# Patient Record
Sex: FEMALE | Race: WHITE | NOT HISPANIC OR LATINO | Employment: OTHER | URBAN - METROPOLITAN AREA
[De-identification: names, ages, dates, MRNs, and addresses within clinical notes are randomized per-mention and may not be internally consistent; named-entity substitution may affect disease eponyms.]

---

## 2018-02-28 ENCOUNTER — HOSPITAL ENCOUNTER (OUTPATIENT)
Dept: GENERAL RADIOLOGY | Facility: HOSPITAL | Age: 63
Discharge: HOME | End: 2018-02-28
Attending: FAMILY MEDICINE | Admitting: FAMILY MEDICINE

## 2018-03-06 ENCOUNTER — HOSPITAL ENCOUNTER (OUTPATIENT)
Dept: GENERAL RADIOLOGY | Facility: HOSPITAL | Age: 63
Discharge: HOME | End: 2018-03-06
Attending: FAMILY MEDICINE | Admitting: FAMILY MEDICINE

## 2018-03-09 ENCOUNTER — HOSPITAL ENCOUNTER (OUTPATIENT)
Dept: OTHER | Facility: HOSPITAL | Age: 63
Discharge: HOME | End: 2018-03-09
Attending: FAMILY MEDICINE | Admitting: FAMILY MEDICINE

## 2019-04-08 ENCOUNTER — HOSPITAL ENCOUNTER (OUTPATIENT)
Dept: MAMMOGRAPHY | Facility: HOSPITAL | Age: 64
Discharge: HOME | End: 2019-04-08
Attending: FAMILY MEDICINE | Admitting: FAMILY MEDICINE

## 2019-06-24 ENCOUNTER — CLINICAL SUPPORT (OUTPATIENT)
Dept: FAMILY MEDICINE CLINIC | Facility: CLINIC | Age: 64
End: 2019-06-24

## 2019-06-24 DIAGNOSIS — D72.829 LEUKOCYTOSIS, UNSPECIFIED TYPE: ICD-10-CM

## 2019-06-24 DIAGNOSIS — R74.8 ABNORMAL LIVER ENZYMES: ICD-10-CM

## 2019-06-24 DIAGNOSIS — E03.9 ACQUIRED HYPOTHYROIDISM: Primary | ICD-10-CM

## 2019-06-24 DIAGNOSIS — R35.0 URINARY FREQUENCY: ICD-10-CM

## 2019-06-24 PROBLEM — E78.5 HYPERLIPIDEMIA: Status: ACTIVE | Noted: 2019-06-24

## 2019-06-24 LAB
BILIRUB BLD-MCNC: NEGATIVE MG/DL
CLARITY, POC: ABNORMAL
COLOR UR: YELLOW
GLUCOSE UR STRIP-MCNC: NEGATIVE MG/DL
KETONES UR QL: NEGATIVE
LEUKOCYTE EST, POC: ABNORMAL
NITRITE UR-MCNC: POSITIVE MG/ML
PH UR: 6 [PH] (ref 5–8)
PROT UR STRIP-MCNC: NEGATIVE MG/DL
RBC # UR STRIP: NEGATIVE /UL
SP GR UR: 1.01 (ref 1–1.03)
UROBILINOGEN UR QL: NORMAL

## 2019-06-24 PROCEDURE — 81002 URINALYSIS NONAUTO W/O SCOPE: CPT | Performed by: FAMILY MEDICINE

## 2019-06-25 LAB
ALBUMIN SERPL-MCNC: 4.7 G/DL (ref 3.6–4.8)
ALBUMIN/GLOB SERPL: 2.1 {RATIO} (ref 1.2–2.2)
ALP SERPL-CCNC: 136 IU/L (ref 39–117)
ALT SERPL-CCNC: 21 IU/L (ref 0–32)
AST SERPL-CCNC: 18 IU/L (ref 0–40)
BASOPHILS # BLD AUTO: 0 X10E3/UL (ref 0–0.2)
BASOPHILS NFR BLD AUTO: 0 %
BILIRUB SERPL-MCNC: 0.3 MG/DL (ref 0–1.2)
BUN SERPL-MCNC: 19 MG/DL (ref 8–27)
BUN/CREAT SERPL: 22 (ref 12–28)
CALCIUM SERPL-MCNC: 9.8 MG/DL (ref 8.7–10.3)
CHLORIDE SERPL-SCNC: 105 MMOL/L (ref 96–106)
CO2 SERPL-SCNC: 19 MMOL/L (ref 20–29)
CREAT SERPL-MCNC: 0.87 MG/DL (ref 0.57–1)
EOSINOPHIL # BLD AUTO: 0.5 X10E3/UL (ref 0–0.4)
EOSINOPHIL NFR BLD AUTO: 5 %
ERYTHROCYTE [DISTWIDTH] IN BLOOD BY AUTOMATED COUNT: 14.3 % (ref 12.3–15.4)
GLOBULIN SER CALC-MCNC: 2.2 G/DL (ref 1.5–4.5)
GLUCOSE SERPL-MCNC: 97 MG/DL (ref 65–99)
HCT VFR BLD AUTO: 41.7 % (ref 34–46.6)
HGB BLD-MCNC: 13.7 G/DL (ref 11.1–15.9)
IMM GRANULOCYTES # BLD AUTO: 0 X10E3/UL (ref 0–0.1)
IMM GRANULOCYTES NFR BLD AUTO: 0 %
LYMPHOCYTES # BLD AUTO: 1.6 X10E3/UL (ref 0.7–3.1)
LYMPHOCYTES NFR BLD AUTO: 20 %
MCH RBC QN AUTO: 29.2 PG (ref 26.6–33)
MCHC RBC AUTO-ENTMCNC: 32.9 G/DL (ref 31.5–35.7)
MCV RBC AUTO: 89 FL (ref 79–97)
MONOCYTES # BLD AUTO: 0.6 X10E3/UL (ref 0.1–0.9)
MONOCYTES NFR BLD AUTO: 7 %
NEUTROPHILS # BLD AUTO: 5.6 X10E3/UL (ref 1.4–7)
NEUTROPHILS NFR BLD AUTO: 68 %
PLATELET # BLD AUTO: 298 X10E3/UL (ref 150–450)
POTASSIUM SERPL-SCNC: 4.4 MMOL/L (ref 3.5–5.2)
PROT SERPL-MCNC: 6.9 G/DL (ref 6–8.5)
RBC # BLD AUTO: 4.69 X10E6/UL (ref 3.77–5.28)
SODIUM SERPL-SCNC: 143 MMOL/L (ref 134–144)
TSH SERPL DL<=0.005 MIU/L-ACNC: 0.09 UIU/ML (ref 0.45–4.5)
WBC # BLD AUTO: 8.4 X10E3/UL (ref 3.4–10.8)

## 2019-06-26 LAB
ALP BONE CFR SERPL: 24 % (ref 14–68)
ALP INTEST CFR SERPL: 2 % (ref 0–18)
ALP LIVER CFR SERPL: 74 % (ref 18–85)
ALP SERPL-CCNC: 136 IU/L (ref 39–117)
WRITTEN AUTHORIZATION: NORMAL

## 2019-07-02 DIAGNOSIS — E05.90 HYPERTHYROIDISM: Primary | ICD-10-CM

## 2019-07-02 RX ORDER — LEVOTHYROXINE SODIUM 88 UG/1
88 TABLET ORAL DAILY
Qty: 30 TABLET | Refills: 0 | Status: SHIPPED | OUTPATIENT
Start: 2019-07-02 | End: 2020-01-07 | Stop reason: SDUPTHER

## 2019-07-02 NOTE — TELEPHONE ENCOUNTER
Spoke with Genevieve  TSH 0.094, taking to much synthroid, will decrease from  Synthroid 100 mcg daily to  Synthroid 88 mcg daily and repeat labs 1 mth.

## 2019-07-05 ENCOUNTER — TELEPHONE (OUTPATIENT)
Dept: FAMILY MEDICINE CLINIC | Facility: CLINIC | Age: 64
End: 2019-07-05

## 2019-07-05 DIAGNOSIS — R74.8 ALKALINE PHOSPHATASE ELEVATION: Primary | ICD-10-CM

## 2019-07-05 NOTE — TELEPHONE ENCOUNTER
Spoke with Genevieve  appointment with Dr Cole  7/12/2019  at 845am. Dr Fishman  would also  Like to get abdominal  U./S  before   appointment , order  was sent for 7/10/2019.

## 2019-07-09 ENCOUNTER — TELEPHONE (OUTPATIENT)
Dept: FAMILY MEDICINE CLINIC | Facility: CLINIC | Age: 64
End: 2019-07-09

## 2019-07-10 ENCOUNTER — TELEPHONE (OUTPATIENT)
Dept: FAMILY MEDICINE CLINIC | Facility: CLINIC | Age: 64
End: 2019-07-10

## 2019-07-10 ENCOUNTER — HOSPITAL ENCOUNTER (OUTPATIENT)
Dept: ULTRASOUND IMAGING | Facility: HOSPITAL | Age: 64
Discharge: HOME | End: 2019-07-10
Admitting: FAMILY MEDICINE

## 2019-07-10 DIAGNOSIS — N28.89 LEFT RENAL MASS: Primary | ICD-10-CM

## 2019-07-10 DIAGNOSIS — R74.8 ALKALINE PHOSPHATASE ELEVATION: ICD-10-CM

## 2019-07-10 PROCEDURE — 76700 US EXAM ABDOM COMPLETE: CPT

## 2019-07-10 NOTE — TELEPHONE ENCOUNTER
Spoke with Genevieve  ABD. U/S showed fatty infltrate of liver and likley etiology of elevated LFTS. The right kidney has a 3.5 cm  In the mid location that radiologist  requested   Ct  with renal protocol  To  evalute  before going to GI. Genevieve is ok  with getting ct will schedule and call her back.

## 2019-07-10 NOTE — TELEPHONE ENCOUNTER
----- Message from Sade Fishman MD sent at 7/10/2019  2:32 PM EDT -----  U/s of abdomin shows fatty infiltrate of the liver and likely etiology of elevated LFT's. The right kidney has a 3.5 cm in it's mid location that needs a CT with renal protocol to evaluate before we go to GI.

## 2019-07-10 NOTE — TELEPHONE ENCOUNTER
Spoke with Genevieve Alcazar  Scheduled  At [facility] 7/11/2109  1000am

## 2019-07-11 ENCOUNTER — HOSPITAL ENCOUNTER (OUTPATIENT)
Dept: CT IMAGING | Facility: HOSPITAL | Age: 64
Discharge: HOME | End: 2019-07-11
Admitting: FAMILY MEDICINE

## 2019-07-11 ENCOUNTER — APPOINTMENT (OUTPATIENT)
Dept: CT IMAGING | Facility: HOSPITAL | Age: 64
End: 2019-07-11

## 2019-07-11 ENCOUNTER — TELEPHONE (OUTPATIENT)
Dept: FAMILY MEDICINE CLINIC | Facility: CLINIC | Age: 64
End: 2019-07-11

## 2019-07-11 DIAGNOSIS — N28.89 LEFT RENAL MASS: ICD-10-CM

## 2019-07-11 PROCEDURE — 0 IOPAMIDOL PER 1 ML: Performed by: FAMILY MEDICINE

## 2019-07-11 PROCEDURE — 74178 CT ABD&PLV WO CNTR FLWD CNTR: CPT

## 2019-07-11 RX ADMIN — IOPAMIDOL 100 ML: 755 INJECTION, SOLUTION INTRAVENOUS at 10:15

## 2019-07-11 NOTE — TELEPHONE ENCOUNTER
Spoke with Genevieve   Ct showed no definitive mass of the right kidney. Genevieve will go ahead with [facility] appointment 7/112/2019 and when that work up I finish she will call us to arrange a urology  Referral  To evaluate left kidney.

## 2019-07-12 ENCOUNTER — OFFICE (OUTPATIENT)
Dept: URBAN - METROPOLITAN AREA CLINIC 64 | Facility: CLINIC | Age: 64
End: 2019-07-12
Payer: COMMERCIAL

## 2019-07-12 VITALS
HEIGHT: 67 IN | SYSTOLIC BLOOD PRESSURE: 113 MMHG | DIASTOLIC BLOOD PRESSURE: 62 MMHG | WEIGHT: 184 LBS | HEART RATE: 89 BPM

## 2019-07-12 DIAGNOSIS — R74.8 ABNORMAL LEVELS OF OTHER SERUM ENZYMES: ICD-10-CM

## 2019-07-12 DIAGNOSIS — R94.5 ABNORMAL RESULTS OF LIVER FUNCTION STUDIES: ICD-10-CM

## 2019-07-12 PROCEDURE — 99203 OFFICE O/P NEW LOW 30 MIN: CPT | Performed by: INTERNAL MEDICINE

## 2019-08-28 ENCOUNTER — OFFICE VISIT (OUTPATIENT)
Dept: FAMILY MEDICINE CLINIC | Facility: CLINIC | Age: 64
End: 2019-08-28

## 2019-08-28 VITALS
HEIGHT: 67 IN | DIASTOLIC BLOOD PRESSURE: 70 MMHG | WEIGHT: 176 LBS | TEMPERATURE: 97.9 F | HEART RATE: 94 BPM | OXYGEN SATURATION: 97 % | SYSTOLIC BLOOD PRESSURE: 126 MMHG | BODY MASS INDEX: 27.62 KG/M2 | RESPIRATION RATE: 18 BRPM

## 2019-08-28 DIAGNOSIS — R93.89 ABNORMAL FINDING ON ULTRASOUND: ICD-10-CM

## 2019-08-28 DIAGNOSIS — R10.9 FLANK PAIN: ICD-10-CM

## 2019-08-28 DIAGNOSIS — N30.00 ACUTE CYSTITIS WITHOUT HEMATURIA: Primary | ICD-10-CM

## 2019-08-28 PROBLEM — Z12.4 SCREENING FOR MALIGNANT NEOPLASM OF CERVIX: Status: ACTIVE | Noted: 2019-08-28

## 2019-08-28 PROBLEM — M25.519 SHOULDER PAIN: Status: ACTIVE | Noted: 2019-08-28

## 2019-08-28 PROBLEM — M25.559 HIP PAIN: Status: ACTIVE | Noted: 2019-08-28

## 2019-08-28 PROBLEM — Z71.89 COUNSELING REGARDING END OF LIFE DECISION MAKING: Status: ACTIVE | Noted: 2019-08-28

## 2019-08-28 PROBLEM — D12.6 TUBULAR ADENOMA OF COLON: Status: ACTIVE | Noted: 2018-03-12

## 2019-08-28 PROBLEM — Z80.0 FAMILY HISTORY OF MALIGNANT NEOPLASM OF GASTROINTESTINAL TRACT: Status: ACTIVE | Noted: 2019-08-28

## 2019-08-28 PROBLEM — E66.3 OVERWEIGHT WITH BODY MASS INDEX (BMI) 25.0-29.9: Status: ACTIVE | Noted: 2019-08-28

## 2019-08-28 PROBLEM — E78.2 MIXED HYPERLIPIDEMIA: Status: ACTIVE | Noted: 2019-06-24

## 2019-08-28 PROBLEM — R92.8 ABNORMAL MAMMOGRAM: Status: ACTIVE | Noted: 2019-08-28

## 2019-08-28 PROBLEM — Z00.01 ENCOUNTER FOR ROUTINE ADULT MEDICAL EXAM WITH ABNORMAL FINDINGS: Status: ACTIVE | Noted: 2019-08-28

## 2019-08-28 PROBLEM — M51.16 LUMBAR DISC DISEASE WITH RADICULOPATHY: Status: ACTIVE | Noted: 2019-08-28

## 2019-08-28 PROBLEM — F41.9 ANXIETY AND DEPRESSION: Status: ACTIVE | Noted: 2019-08-28

## 2019-08-28 PROBLEM — Z13.31 POSITIVE DEPRESSION SCREENING: Status: ACTIVE | Noted: 2019-08-28

## 2019-08-28 PROBLEM — M72.2 PLANTAR FASCIAL FIBROMATOSIS: Status: ACTIVE | Noted: 2019-08-28

## 2019-08-28 PROBLEM — R22.2 SUPRACLAVICULAR MASS: Status: ACTIVE | Noted: 2019-08-28

## 2019-08-28 PROBLEM — M51.06 INTERVERTEBRAL LUMBAR DISC DISORDER WITH MYELOPATHY, LUMBAR REGION: Status: ACTIVE | Noted: 2019-08-28

## 2019-08-28 PROBLEM — Z13.9 ENCOUNTER FOR SCREENING: Status: ACTIVE | Noted: 2019-08-28

## 2019-08-28 PROBLEM — K57.30 DIVERTICULOSIS OF LARGE INTESTINE WITHOUT HEMORRHAGE: Status: ACTIVE | Noted: 2019-08-28

## 2019-08-28 PROBLEM — Z12.11 SPECIAL SCREENING FOR MALIGNANT NEOPLASMS, COLON: Status: ACTIVE | Noted: 2019-08-28

## 2019-08-28 PROBLEM — E89.40 ASYMPTOMATIC POSTPROCEDURAL OVARIAN FAILURE: Status: ACTIVE | Noted: 2019-08-28

## 2019-08-28 PROBLEM — F32.A ANXIETY AND DEPRESSION: Status: ACTIVE | Noted: 2019-08-28

## 2019-08-28 PROBLEM — Z12.12 ENCOUNTER FOR SCREENING FOR MALIGNANT NEOPLASM OF RECTUM: Status: ACTIVE | Noted: 2019-08-28

## 2019-08-28 PROBLEM — M25.50 ARTHRALGIA OF MULTIPLE SITES: Status: ACTIVE | Noted: 2019-08-28

## 2019-08-28 PROBLEM — F33.2 ENDOGENOUS DEPRESSION: Status: ACTIVE | Noted: 2019-08-28

## 2019-08-28 PROBLEM — Z78.0 ASYMPTOMATIC MENOPAUSAL STATE: Status: ACTIVE | Noted: 2019-08-28

## 2019-08-28 PROBLEM — N63.10 MASS OF RIGHT BREAST ON MAMMOGRAM: Status: ACTIVE | Noted: 2019-08-28

## 2019-08-28 LAB
BILIRUB BLD-MCNC: NEGATIVE MG/DL
CLARITY, POC: CLEAR
COLOR UR: YELLOW
GLUCOSE UR STRIP-MCNC: NEGATIVE MG/DL
KETONES UR QL: NEGATIVE
LEUKOCYTE EST, POC: ABNORMAL
NITRITE UR-MCNC: POSITIVE MG/ML
PH UR: 5 [PH] (ref 5–8)
PROT UR STRIP-MCNC: ABNORMAL MG/DL
RBC # UR STRIP: NEGATIVE /UL
SP GR UR: 1.01 (ref 1–1.03)
UROBILINOGEN UR QL: NORMAL

## 2019-08-28 PROCEDURE — 81002 URINALYSIS NONAUTO W/O SCOPE: CPT | Performed by: FAMILY MEDICINE

## 2019-08-28 PROCEDURE — 99213 OFFICE O/P EST LOW 20 MIN: CPT | Performed by: FAMILY MEDICINE

## 2019-08-28 RX ORDER — ESCITALOPRAM OXALATE 20 MG/1
20 TABLET ORAL DAILY
COMMUNITY
Start: 2019-04-19 | End: 2020-01-07 | Stop reason: SDUPTHER

## 2019-08-28 RX ORDER — ATORVASTATIN CALCIUM 10 MG/1
10 TABLET, FILM COATED ORAL DAILY
COMMUNITY
Start: 2019-04-19 | End: 2020-01-07 | Stop reason: SDUPTHER

## 2019-08-28 RX ORDER — MONTELUKAST SODIUM 10 MG/1
10 TABLET ORAL DAILY
COMMUNITY
Start: 2019-04-19 | End: 2020-01-07 | Stop reason: SDUPTHER

## 2019-08-28 RX ORDER — SULFAMETHOXAZOLE AND TRIMETHOPRIM 800; 160 MG/1; MG/1
1 TABLET ORAL 2 TIMES DAILY
Qty: 14 TABLET | Refills: 0 | Status: SHIPPED | OUTPATIENT
Start: 2019-08-28 | End: 2019-12-09

## 2019-08-28 RX ORDER — CHLORAL HYDRATE 500 MG
1 CAPSULE ORAL 3 TIMES DAILY
COMMUNITY
Start: 2019-03-19

## 2019-08-28 RX ORDER — BUPROPION HYDROCHLORIDE 300 MG/1
300 TABLET ORAL DAILY
COMMUNITY
Start: 2019-04-19 | End: 2019-12-09

## 2019-08-28 RX ORDER — ALBUTEROL SULFATE 2.5 MG/3ML
SOLUTION RESPIRATORY (INHALATION) AS NEEDED
COMMUNITY
Start: 2019-04-24

## 2019-08-28 NOTE — PROGRESS NOTES
Chief Complaint   Patient presents with   • Urinary Tract Infection       Subjective   Genevieve Kuo is a 64 y.o. female.     She presents today for a UTI and wants a referral to urology.  Her PCP is Dr. Fishman.  Patient reports that Dr. Fishman recommended last month that Mrs. Kuo see a urologist for an abnormality in her left kidney.  The abnormality was noted on ultrasound (performed due to a persistently elevated alkaline phosphatase).  Patient then had a CT abd/pel that did not show a definitive mass in the kidney.      Patient denies history of recurrent UTI or kidney stone.      Urinary Tract Infection    This is a recurrent problem. The current episode started more than 1 month ago. The problem occurs intermittently. The quality of the pain is described as aching. The pain is mild. There has been no fever. She is not sexually active. There is no history of pyelonephritis. Associated symptoms include flank pain, frequency, hesitancy and urgency. Pertinent negatives include no chills, hematuria or vomiting. She has tried antibiotics for the symptoms. The treatment provided no relief.      Patient has seen [facility] recently about abnormal alkaline phosphatase.    I have reviewed and updated her medications, medical history and problem list during today's office visit.     Past Medical History:   Diagnosis Date   • Hyperlipidemia    • Hypothyroidism        Past Surgical History:   Procedure Laterality Date   • HYSTERECTOMY         History reviewed. No pertinent family history.    Current Outpatient Medications on File Prior to Visit   Medication Sig Dispense Refill   • albuterol (PROVENTIL) (2.5 MG/3ML) 0.083% nebulizer solution Inhale As Needed.     • atorvastatin (LIPITOR) 10 MG tablet Take 10 mg by mouth Daily.     • buPROPion XL (WELLBUTRIN XL) 300 MG 24 hr tablet Take 300 mg by mouth Daily.     • escitalopram (LEXAPRO) 20 MG tablet Take 20 mg by mouth Daily.     • levothyroxine (SYNTHROID, LEVOTHROID) 88 MCG  "tablet Take 1 tablet by mouth Daily. 30 tablet 0   • montelukast (SINGULAIR) 10 MG tablet Take 10 mg by mouth Daily.     • Omega-3 Fatty Acids (FISH OIL) 1000 MG capsule capsule Take 1 capsule by mouth 3 (Three) Times a Day.     • Probiotic capsule Take 1 capsule by mouth Daily.       No current facility-administered medications on file prior to visit.        PHQ-2 Depression Screening  Little interest or pleasure in doing things? 0   Feeling down, depressed, or hopeless? 0   PHQ-2 Total Score 0         Social History     Tobacco Use   • Smoking status: Never Smoker   • Smokeless tobacco: Never Used   Substance Use Topics   • Alcohol use: No     Frequency: Never       Review of Systems   Constitutional: Negative for chills and fever.   Respiratory: Negative for shortness of breath.    Gastrointestinal: Negative for abdominal pain, constipation, diarrhea and vomiting.   Genitourinary: Positive for flank pain, frequency, hesitancy, urgency and urinary incontinence. Negative for dysuria, hematuria and vaginal bleeding.       Objective   Vitals:    08/28/19 0954   BP: 126/70   Pulse: 94   Resp: 18   Temp: 97.9 °F (36.6 °C)   TempSrc: Oral   SpO2: 97%   Weight: 79.8 kg (176 lb)   Height: 170.2 cm (67\")     Body mass index is 27.57 kg/m².  Physical Exam   HENT:   Head: Normocephalic and atraumatic.   Mouth/Throat: Oropharynx is clear and moist.   Eyes: Conjunctivae and EOM are normal. Pupils are equal, round, and reactive to light. Right eye exhibits no discharge. Left eye exhibits no discharge. No scleral icterus.   Neck: Normal range of motion. Neck supple. No JVD present. No edema present.   Cardiovascular: Normal rate, regular rhythm and normal heart sounds.   Pulmonary/Chest: Effort normal and breath sounds normal.   Abdominal: Soft. Bowel sounds are normal. There is no tenderness. There is no rigidity, no rebound, no guarding, no CVA tenderness and negative Garcia's sign.   Musculoskeletal: Normal range of motion. " She exhibits no edema.   Neurological: She is alert. No cranial nerve deficit.   Skin: No rash noted.   Psychiatric: She has a normal mood and affect. Thought content normal.           Lab Results   Component Value Date    GLU 97 06/24/2019    BUN 19 06/24/2019    CREATININE 0.87 06/24/2019    EGFRIFNONA 70 06/24/2019    EGFRIFAFRI 80 06/24/2019     06/24/2019    K 4.4 06/24/2019     06/24/2019    CALCIUM 9.8 06/24/2019    ALBUMIN 4.7 06/24/2019    BILITOT 0.3 06/24/2019    ALKPHOS 136 (H) 06/24/2019    ALKPHOS 136 (H) 06/24/2019    AST 18 06/24/2019    ALT 21 06/24/2019    WBC 8.4 06/24/2019    RBC 4.69 06/24/2019    HCT 41.7 06/24/2019    MCV 89 06/24/2019    MCH 29.2 06/24/2019    TSH 0.094 (L) 06/24/2019        Brief Urine Lab Results  (Last result in the past 365 days)      Color   Clarity   Blood   Leuk Est   Nitrite   Protein   CREAT   Urine HCG        08/28/19 1010 Yellow Clear Negative 75 Ramiro/ul Positive Trace             __________________________________________________________________________________  Ct Abdomen Pelvis With & Without Contrast    Result Date: 7/11/2019  IMPRESSION : 1.Lobular contour to the anterior margin of the left kidney might account for the finding noted on the ultrasound. A definitive mass is not seen. 2.Hepatic steatosis. There is suggested cyst in the left lobe of the liver. 3.Atelectatic changes lower lungs. 4.Degenerative changes lower lumbar spine.  Electronically Signed By-Herson Ashton On:7/11/2019 11:18 AM This report was finalized on [id number] by  Herson Ashton, .    Us Abdomen Complete    Result Date: 7/10/2019   1. 3.5 cm hypoechoic masslike area within the mid left kidney. Suspicious for a solid mass. Dedicated renal protocol CT or MRI before and after IV contrast would be recommended for further characterization. 2. Status post cholecystectomy. No evidence of biliary tract obstruction. 3. Heterogeneous appearance of the liver parenchyma which is  nonspecific and could be related to hepatocellular disease or fatty infiltration. No focal hepatic mass identified.  Electronically Signed By-Lito Nelson On:7/10/2019 9:31 AM This report was finalized on [id number] by  Lito Nelson, .    ___________________________________________________________________________________    Assessment/Plan       Diagnoses and all orders for this visit:    1. Acute cystitis without hematuria (Primary)  -     Urine Culture - Urine, Urine, Random Void  -     sulfamethoxazole-trimethoprim (BACTRIM DS) 800-160 MG per tablet; Take 1 tablet by mouth 2 (Two) Times a Day.  Dispense: 14 tablet; Refill: 0    2. Flank pain  -     POCT urinalysis dipstick, manual    3. Abnormal finding on ultrasound  -     Ambulatory Referral to Urology    Treat UTI.  Referral made at request of patient and recommendation by Dr. Fishman.    Return if symptoms worsen or fail to improve.

## 2019-08-28 NOTE — PATIENT INSTRUCTIONS
Preventing Health Risks of Being Overweight  Maintaining a healthy body weight is an important part of your overall health. Your healthy body weight depends on your age, gender, and height. Being overweight puts you at risk for many health problems, including:  · Heart disease.  · Diabetes.  · Problems sleeping.  · Joint problems.  You can make changes to your diet and lifestyle to prevent these risks. Consider working with a health care provider or a dietitian to make these changes.  What nutrition changes can be made?    · Eat only as much as your body needs. In most cases, this is about 2,000 calories a day, but the amount varies depending on your height, gender, and activity level. Ask your health care provider how many calories you should have each day. Eating more than your body needs on a regular basis can cause you to become overweight or obese.  · Eat slowly, and stop eating when you feel full.  · Choose healthy foods, including:  ? Fruits and vegetables.  ? Lean meats.  ? Low-fat dairy products.  ? High-fiber foods, such as whole grains and beans.  ? Healthy snacks like vegetable sticks, a piece of fruit, or a small amount of yogurt or cheese.  · Avoid foods and drinks that are high in sugar, salt (sodium), saturated fat, or trans fat. This includes:  ? Many desserts such as candy, cookies, and ice cream.  ? Soda.  ? Fried foods.  ? Processed meats such as hot dogs or lunch meats.  ? Prepackaged snack foods.  What lifestyle changes can be made?    · Exercise for at least 150 minutes a week to prevent weight gain, or as often as recommended by your health care provider. Do moderate-intensity exercise, such as brisk walking.  ? Spread it out by exercising for 30 minutes 5 days a week, or in short 10-minute bursts several times a day.  · Find other ways to stay active and burn calories, such as yard work or a hobby that involves physical activity.  · Get at least 8 hours of sleep each night. When you are  well-rested, you are more likely to be active and make healthy choices during the day. To sleep better:  ? Try to go to bed and wake up at about the same time every day.  ? Keep your bedroom dark, quiet, and cool.  ? Make sure that your bed is comfortable.  ? Avoid stimulating activities, such as watching television or exercising, for at least one hour before bedtime.  Why are these changes important?  Eating healthy and being active helps you lose weight and prevent health problems caused by being overweight. Making these changes can also help you manage stress, feel better mentally, and connect with friends and family.  What can happen if changes are not made?  Being overweight can affect you for your entire life. You may develop joint or bone problems that make it painful or difficult for you to play sports or do activities you enjoy. Being overweight puts stress on your heart and lungs and can lead to medical problems like diabetes, heart disease, and sleeping problems.  Where to find support  You can get support for preventing health risks of being overweight from:  · Your health care provider or a dietitian. They can provide guidance about healthy eating and healthy lifestyle choices.  · Weight loss support groups, online or in-person.  Where to find more information  · MyPlate: www.choosemyplate.gov  ? This an online tool that provides personalized recommendations about foods to eat each day.  · The Centers for Disease Control and Prevention: www.cdc.gov/healthyweight  ? This resource gives tips for managing weight and having an active lifestyle.  Summary  · To prevent unhealthy weight gain, it is important to maintain a healthy diet high in vegetables and whole grains, exercise regularly, and get at least 8 hours of sleep each night.  · Making these changes helps prevent many long-term (chronic) health conditions that can shorten your life, such as diabetes, heart disease, and stroke.  This information is  not intended to replace advice given to you by your health care provider. Make sure you discuss any questions you have with your health care provider.  Document Released: 11/14/2018 Document Revised: 11/14/2018 Document Reviewed: 11/14/2018  Elsevier Interactive Patient Education © 2019 [employer]

## 2019-08-29 ENCOUNTER — TELEPHONE (OUTPATIENT)
Dept: FAMILY MEDICINE CLINIC | Facility: CLINIC | Age: 64
End: 2019-08-29

## 2019-10-16 ENCOUNTER — OFFICE (OUTPATIENT)
Dept: URBAN - METROPOLITAN AREA CLINIC 64 | Facility: CLINIC | Age: 64
End: 2019-10-16
Payer: COMMERCIAL

## 2019-10-16 VITALS
SYSTOLIC BLOOD PRESSURE: 112 MMHG | HEIGHT: 67 IN | HEART RATE: 71 BPM | DIASTOLIC BLOOD PRESSURE: 67 MMHG | WEIGHT: 174 LBS

## 2019-10-16 DIAGNOSIS — K91.5 POSTCHOLECYSTECTOMY SYNDROME: ICD-10-CM

## 2019-10-16 DIAGNOSIS — R94.5 ABNORMAL RESULTS OF LIVER FUNCTION STUDIES: ICD-10-CM

## 2019-10-16 DIAGNOSIS — Z90.49 ACQUIRED ABSENCE OF OTHER SPECIFIED PARTS OF DIGESTIVE TRACT: ICD-10-CM

## 2019-10-16 PROCEDURE — 99213 OFFICE O/P EST LOW 20 MIN: CPT | Performed by: INTERNAL MEDICINE

## 2019-10-16 RX ORDER — COLESTIPOL HYDROCHLORIDE 1 G/1
3 TABLET, FILM COATED ORAL
Qty: 90 | Refills: 11 | Status: ACTIVE
Start: 2019-10-16

## 2019-12-09 ENCOUNTER — OFFICE VISIT (OUTPATIENT)
Dept: FAMILY MEDICINE CLINIC | Facility: CLINIC | Age: 64
End: 2019-12-09

## 2019-12-09 VITALS
RESPIRATION RATE: 18 BRPM | HEART RATE: 81 BPM | DIASTOLIC BLOOD PRESSURE: 80 MMHG | SYSTOLIC BLOOD PRESSURE: 122 MMHG | OXYGEN SATURATION: 95 % | HEIGHT: 67 IN | BODY MASS INDEX: 27 KG/M2 | TEMPERATURE: 98.1 F | WEIGHT: 172 LBS

## 2019-12-09 DIAGNOSIS — N30.01 ACUTE CYSTITIS WITH HEMATURIA: Primary | ICD-10-CM

## 2019-12-09 DIAGNOSIS — Z23 NEED FOR IMMUNIZATION AGAINST INFLUENZA: ICD-10-CM

## 2019-12-09 DIAGNOSIS — E66.3 OVERWEIGHT WITH BODY MASS INDEX (BMI) 25.0-29.9: ICD-10-CM

## 2019-12-09 PROBLEM — N39.0 UTI (URINARY TRACT INFECTION): Status: RESOLVED | Noted: 2019-12-09 | Resolved: 2019-12-09

## 2019-12-09 PROBLEM — E89.40 ASYMPTOMATIC POSTPROCEDURAL OVARIAN FAILURE: Status: RESOLVED | Noted: 2019-08-28 | Resolved: 2019-12-09

## 2019-12-09 PROBLEM — F41.9 ANXIETY AND DEPRESSION: Status: RESOLVED | Noted: 2019-08-28 | Resolved: 2019-12-09

## 2019-12-09 PROBLEM — N39.0 UTI (URINARY TRACT INFECTION): Status: ACTIVE | Noted: 2019-12-09

## 2019-12-09 PROBLEM — M72.2 PLANTAR FASCIAL FIBROMATOSIS: Status: RESOLVED | Noted: 2019-08-28 | Resolved: 2019-12-09

## 2019-12-09 PROBLEM — F33.2 ENDOGENOUS DEPRESSION: Status: RESOLVED | Noted: 2019-08-28 | Resolved: 2019-12-09

## 2019-12-09 PROBLEM — F32.A ANXIETY AND DEPRESSION: Status: RESOLVED | Noted: 2019-08-28 | Resolved: 2019-12-09

## 2019-12-09 LAB
BILIRUB BLD-MCNC: NEGATIVE MG/DL
CLARITY, POC: CLEAR
COLOR UR: YELLOW
GLUCOSE UR STRIP-MCNC: NEGATIVE MG/DL
KETONES UR QL: NEGATIVE
LEUKOCYTE EST, POC: NEGATIVE
NITRITE UR-MCNC: NEGATIVE MG/ML
PH UR: 5 [PH] (ref 5–8)
PROT UR STRIP-MCNC: ABNORMAL MG/DL
RBC # UR STRIP: ABNORMAL /UL
SP GR UR: 1.02 (ref 1–1.03)
UROBILINOGEN UR QL: NORMAL

## 2019-12-09 PROCEDURE — 99213 OFFICE O/P EST LOW 20 MIN: CPT | Performed by: FAMILY MEDICINE

## 2019-12-09 PROCEDURE — 81003 URINALYSIS AUTO W/O SCOPE: CPT | Performed by: FAMILY MEDICINE

## 2019-12-09 RX ORDER — SULFAMETHOXAZOLE AND TRIMETHOPRIM 800; 160 MG/1; MG/1
1 TABLET ORAL 2 TIMES DAILY
Qty: 20 TABLET | Refills: 0 | Status: SHIPPED | OUTPATIENT
Start: 2019-12-09 | End: 2020-09-10

## 2019-12-09 NOTE — PROGRESS NOTES
Subjective   Genevieve Kuo is a 64 y.o. female.     Chief Complaint   Patient presents with   • Urinary Tract Infection       Urinary Tract Infection    This is a new problem. The current episode started in the past 7 days (Genevieve states her symptoms started on Thursday 12/5/2019). The problem occurs every urination. The problem has been gradually worsening. The quality of the pain is described as aching, stabbing, shooting and burning. The pain is at a severity of 4/10. The pain is mild. The maximum temperature recorded prior to her arrival was 100 - 100.9 F. The fever has been present for less than 1 day. Associated symptoms include frequency and urgency. Pertinent negatives include no chills, discharge, flank pain, nausea or vomiting. She has tried acetaminophen for the symptoms. The treatment provided mild relief.   Hyperlipidemia   This is a chronic problem. The current episode started more than 1 year ago. The problem is controlled. Exacerbating diseases include hypothyroidism and obesity. Associated symptoms include a focal weakness. Pertinent negatives include no chest pain or shortness of breath. Current antihyperlipidemic treatment includes diet change.        The following portions of the patient's history were reviewed and updated as appropriate: allergies, current medications, past family history, past medical history, past social history, past surgical history and problem list.    Allergies:  Allergies   Allergen Reactions   • Aspirin Unknown - Low Severity   • Ibuprofen Unknown - Low Severity       Social History:  Social History     Socioeconomic History   • Marital status:      Spouse name: Not on file   • Number of children: Not on file   • Years of education: Not on file   • Highest education level: Not on file   Tobacco Use   • Smoking status: Never Smoker   • Smokeless tobacco: Never Used   Substance and Sexual Activity   • Alcohol use: No     Frequency: Never   • Drug use: No   • Sexual  activity: Defer       Family History:  Family History   Problem Relation Age of Onset   • Colon cancer Mother    • Hypertension Mother    • Ovarian cancer Mother    • Colon cancer Father    • COPD Father    • Lung cancer Father    • Emphysema Father    • Heart disease Brother    • Hypertension Brother    • COPD Brother    • Multiple sclerosis Daughter        Past Medical History :  Patient Active Problem List   Diagnosis   • Mixed hyperlipidemia   • Breast mass, right   • Acquired hypothyroidism   • Special screening for malignant neoplasms, colon   • Tubular adenoma of colon   • Supraclavicular mass   • Screening for malignant neoplasm of cervix   • Overweight with body mass index (BMI) 25.0-29.9   • Menopausal syndrome   • Mass of right breast on mammogram   • Lumbar disc disease with radiculopathy   • Irritable colon   • Intervertebral lumbar disc disorder with myelopathy, lumbar region   • Fibrocystic changes of right breast   • Family history of malignant neoplasm of gastrointestinal tract   • Encounter for screening   • Encounter for routine adult medical exam with abnormal findings   • Encounter for screening for malignant neoplasm of rectum   • Counseling regarding end of life decision making   • Diverticulosis of large intestine without hemorrhage   • Chronic seasonal allergic rhinitis due to pollen   • Asymptomatic menopausal state   • Arthralgia of multiple sites   • Abnormal mammogram       Medication List:    Current Outpatient Medications:   •  albuterol (PROVENTIL) (2.5 MG/3ML) 0.083% nebulizer solution, Inhale As Needed., Disp: , Rfl:   •  atorvastatin (LIPITOR) 10 MG tablet, Take 10 mg by mouth Daily., Disp: , Rfl:   •  escitalopram (LEXAPRO) 20 MG tablet, Take 20 mg by mouth Daily., Disp: , Rfl:   •  levothyroxine (SYNTHROID, LEVOTHROID) 88 MCG tablet, Take 1 tablet by mouth Daily., Disp: 30 tablet, Rfl: 0  •  montelukast (SINGULAIR) 10 MG tablet, Take 10 mg by mouth Daily., Disp: , Rfl:   •   "Omega-3 Fatty Acids (FISH OIL) 1000 MG capsule capsule, Take 1 capsule by mouth 3 (Three) Times a Day., Disp: , Rfl:   •  Probiotic capsule, Take 1 capsule by mouth Daily., Disp: , Rfl:   •  sulfamethoxazole-trimethoprim (BACTRIM DS,SEPTRA DS) 800-160 MG per tablet, Take 1 tablet by mouth 2 (Two) Times a Day., Disp: 20 tablet, Rfl: 0    Past Surgical History:  Past Surgical History:   Procedure Laterality Date   • HYSTERECTOMY         Review of Systems:  Review of Systems   Constitutional: Positive for fatigue. Negative for chills, fever, unexpected weight gain and unexpected weight loss.   Respiratory: Negative for shortness of breath.    Cardiovascular: Negative for chest pain.   Gastrointestinal: Negative for abdominal distention, constipation, diarrhea, nausea and vomiting.   Endocrine: Negative for cold intolerance, heat intolerance, polydipsia and polyuria.   Genitourinary: Positive for frequency, pelvic pressure and urgency. Negative for flank pain.   Neurological: Positive for focal weakness. Negative for weakness, numbness and headache.   Hematological: Negative for adenopathy.       Physical Exam:  Vital Signs:  Visit Vitals  /80 (BP Location: Left arm, Patient Position: Sitting, Cuff Size: Adult)   Pulse 81   Temp 98.1 °F (36.7 °C)   Resp 18   Ht 170.2 cm (67\")   Wt 78 kg (172 lb)   SpO2 95% Comment: room air   BMI 26.94 kg/m²       Physical Exam   Constitutional: She is oriented to person, place, and time. She appears well-developed and well-nourished. No distress.   Neck: Neck supple. No JVD present. No thyromegaly present.   Cardiovascular: Normal rate, regular rhythm, normal heart sounds and intact distal pulses. Exam reveals no gallop and no friction rub.   No murmur heard.  Pulmonary/Chest: Effort normal and breath sounds normal. No respiratory distress. She has no wheezes. She has no rales.   Abdominal: Soft. Bowel sounds are normal. She exhibits no distension and no mass. There is no " tenderness. There is no CVA tenderness.   Musculoskeletal: She exhibits no edema.   Lymphadenopathy:     She has no cervical adenopathy.   Neurological: She is alert and oriented to person, place, and time. Coordination normal.   Skin: Skin is warm. No rash noted. No erythema.   Psychiatric: She has a normal mood and affect.   Vitals reviewed.      Assessment and Plan:  Problem List Items Addressed This Visit        Low    Overweight with body mass index (BMI) 25.0-29.9    Overview     30 lb wt loss. She is maintaining life style modifications.             Unprioritized    Mixed hyperlipidemia    Overview     decreased HDL, 31,  Diet controlled. She is encouraged to increase exercise.         RESOLVED: UTI (urinary tract infection) - Primary    Relevant Medications    sulfamethoxazole-trimethoprim (BACTRIM DS,SEPTRA DS) 800-160 MG per tablet    Other Relevant Orders    POC Urinalysis Dipstick, Automated (Completed)    Urine Culture - Urine, Urine, Clean Catch      Other Visit Diagnoses     Need for immunization against influenza              An After Visit Summary and PPPS were given to the patient.

## 2019-12-11 LAB
BACTERIA UR CULT: NO GROWTH
BACTERIA UR CULT: NORMAL

## 2019-12-13 ENCOUNTER — TELEPHONE (OUTPATIENT)
Dept: FAMILY MEDICINE CLINIC | Facility: CLINIC | Age: 64
End: 2019-12-13

## 2019-12-13 DIAGNOSIS — R35.0 URINARY FREQUENCY: Primary | ICD-10-CM

## 2019-12-13 RX ORDER — METHENAMINE, SODIUM PHOSPHATE, MONOBASIC, MONOHYDRATE, PHENYL SALICYLATE, METHYLENE BLUE, AND HYOSCYAMINE SULFATE 120; 40.8; 36; 10; .12 MG/1; MG/1; MG/1; MG/1; MG/1
1 CAPSULE ORAL 4 TIMES DAILY
Qty: 40 CAPSULE | Refills: 12 | Status: SHIPPED | OUTPATIENT
Start: 2019-12-13 | End: 2020-09-10

## 2019-12-13 NOTE — TELEPHONE ENCOUNTER
Genevieve called  she stopped bactrim per  instructions 12./10/2019, . Today she has re occuring  frequency and  pain . Genevieve questions if she should re start bactrim.        Per Dr Fishman , no do not restart bactrim, will sent urabel and if symptoms continue or no imreferral.

## 2019-12-23 ENCOUNTER — TELEPHONE (OUTPATIENT)
Dept: FAMILY MEDICINE CLINIC | Facility: CLINIC | Age: 64
End: 2019-12-23

## 2019-12-23 DIAGNOSIS — R35.0 URINARY FREQUENCY: Primary | ICD-10-CM

## 2019-12-23 NOTE — TELEPHONE ENCOUNTER
Genevieve called continues with frequency, and pressure, wants to go ahead with urology  referral as discussed.      Appointment was arranged for 1/6/2020 at 8:15am with Dr Camarillo in [place] office. Recorss were forwarded.

## 2020-01-07 DIAGNOSIS — E78.2 MIXED HYPERLIPIDEMIA: ICD-10-CM

## 2020-01-07 DIAGNOSIS — T78.40XA ALLERGIC STATE, INITIAL ENCOUNTER: ICD-10-CM

## 2020-01-07 DIAGNOSIS — E05.90 HYPERTHYROIDISM: ICD-10-CM

## 2020-01-07 DIAGNOSIS — F41.9 ANXIETY: Primary | ICD-10-CM

## 2020-01-07 RX ORDER — MONTELUKAST SODIUM 10 MG/1
10 TABLET ORAL DAILY
Qty: 90 TABLET | Refills: 4 | Status: SHIPPED | OUTPATIENT
Start: 2020-01-07 | End: 2020-11-18 | Stop reason: SDUPTHER

## 2020-01-07 RX ORDER — ATORVASTATIN CALCIUM 10 MG/1
10 TABLET, FILM COATED ORAL DAILY
Qty: 90 TABLET | Refills: 4 | Status: SHIPPED | OUTPATIENT
Start: 2020-01-07 | End: 2020-11-18 | Stop reason: SDUPTHER

## 2020-01-07 RX ORDER — LEVOTHYROXINE SODIUM 88 UG/1
88 TABLET ORAL DAILY
Qty: 90 TABLET | Refills: 4 | Status: SHIPPED | OUTPATIENT
Start: 2020-01-07 | End: 2020-11-18 | Stop reason: SDUPTHER

## 2020-01-07 RX ORDER — ESCITALOPRAM OXALATE 20 MG/1
20 TABLET ORAL DAILY
Qty: 90 TABLET | Refills: 4 | Status: SHIPPED | OUTPATIENT
Start: 2020-01-07 | End: 2020-11-18 | Stop reason: SDUPTHER

## 2020-09-10 ENCOUNTER — OFFICE VISIT (OUTPATIENT)
Dept: FAMILY MEDICINE CLINIC | Facility: CLINIC | Age: 65
End: 2020-09-10

## 2020-09-10 VITALS
DIASTOLIC BLOOD PRESSURE: 80 MMHG | OXYGEN SATURATION: 94 % | TEMPERATURE: 97.3 F | HEIGHT: 67 IN | BODY MASS INDEX: 29.85 KG/M2 | SYSTOLIC BLOOD PRESSURE: 124 MMHG | RESPIRATION RATE: 18 BRPM | WEIGHT: 190.2 LBS | HEART RATE: 92 BPM

## 2020-09-10 DIAGNOSIS — M62.838 TRAPEZIUS MUSCLE SPASM: ICD-10-CM

## 2020-09-10 DIAGNOSIS — M25.511 ACUTE PAIN OF RIGHT SHOULDER: Primary | ICD-10-CM

## 2020-09-10 PROCEDURE — 96372 THER/PROPH/DIAG INJ SC/IM: CPT | Performed by: FAMILY MEDICINE

## 2020-09-10 PROCEDURE — 99213 OFFICE O/P EST LOW 20 MIN: CPT | Performed by: FAMILY MEDICINE

## 2020-09-10 RX ORDER — TIZANIDINE 4 MG/1
4 TABLET ORAL NIGHTLY PRN
Qty: 14 TABLET | Refills: 0 | Status: SHIPPED | OUTPATIENT
Start: 2020-09-10 | End: 2020-10-06 | Stop reason: SDUPTHER

## 2020-09-10 RX ORDER — METHYLPREDNISOLONE ACETATE 40 MG/ML
40 INJECTION, SUSPENSION INTRA-ARTICULAR; INTRALESIONAL; INTRAMUSCULAR; SOFT TISSUE ONCE
Status: COMPLETED | OUTPATIENT
Start: 2020-09-10 | End: 2020-09-10

## 2020-09-10 RX ORDER — METHYLPREDNISOLONE 4 MG/1
TABLET ORAL
Qty: 21 TABLET | Refills: 0 | Status: SHIPPED | OUTPATIENT
Start: 2020-09-10 | End: 2020-11-03

## 2020-09-10 RX ADMIN — METHYLPREDNISOLONE ACETATE 40 MG: 40 INJECTION, SUSPENSION INTRA-ARTICULAR; INTRALESIONAL; INTRAMUSCULAR; SOFT TISSUE at 17:40

## 2020-09-10 NOTE — PROGRESS NOTES
Subjective   Genevieve Kuo is a 65 y.o. female.     Chief Complaint   Patient presents with   • Shoulder Pain   • Back Pain       Back Pain   This is a new problem. The current episode started more than 1 month ago. The problem occurs constantly. The problem has been gradually worsening since onset. The pain is present in the lumbar spine. The quality of the pain is described as aching and stabbing. The pain radiates to the right thigh and right knee. The pain is moderate. The pain is the same all the time. The symptoms are aggravated by bending, coughing, lying down, standing, twisting and sitting. Stiffness is present all day. Associated symptoms include headaches and tingling. Pertinent negatives include no abdominal pain, chest pain, fever, numbness or weakness. She has tried heat and ice (tylenol) for the symptoms. The treatment provided moderate relief.   Shoulder Injury    The incident occurred at home. The right shoulder is affected. The incident occurred more than 1 week ago. Injury mechanism: lifting a calf. The quality of the pain is described as aching, stabbing and shooting. The pain radiates to the right arm and right neck. Associated symptoms include tingling. Pertinent negatives include no chest pain or numbness. The symptoms are aggravated by movement and overhead lifting. She has tried heat and ice (tylenol) for the symptoms. The treatment provided mild relief.        The following portions of the patient's history were reviewed and updated as appropriate: allergies, current medications, past family history, past medical history, past social history, past surgical history and problem list.    Allergies:  Allergies   Allergen Reactions   • Aspirin Unknown - Low Severity   • Ibuprofen Unknown - Low Severity       Social History:  Social History     Socioeconomic History   • Marital status:      Spouse name: Not on file   • Number of children: Not on file   • Years of education: Not on file    • Highest education level: Not on file   Tobacco Use   • Smoking status: Never Smoker   • Smokeless tobacco: Never Used   Substance and Sexual Activity   • Alcohol use: No     Frequency: Never   • Drug use: No   • Sexual activity: Defer       Family History:  Family History   Problem Relation Age of Onset   • Colon cancer Mother    • Hypertension Mother    • Ovarian cancer Mother    • Colon cancer Father    • COPD Father    • Lung cancer Father    • Emphysema Father    • Heart disease Brother    • Hypertension Brother    • COPD Brother    • Multiple sclerosis Daughter        Past Medical History :  Patient Active Problem List   Diagnosis   • Mixed hyperlipidemia   • Breast mass, right   • Acquired hypothyroidism   • Special screening for malignant neoplasms, colon   • Tubular adenoma of colon   • Supraclavicular mass   • Screening for malignant neoplasm of cervix   • Overweight with body mass index (BMI) 25.0-29.9   • Menopausal syndrome   • Mass of right breast on mammogram   • Lumbar disc disease with radiculopathy   • Irritable colon   • Intervertebral lumbar disc disorder with myelopathy, lumbar region   • Fibrocystic changes of right breast   • Family history of malignant neoplasm of gastrointestinal tract   • Encounter for screening   • Encounter for routine adult medical exam with abnormal findings   • Encounter for screening for malignant neoplasm of rectum   • Counseling regarding end of life decision making   • Diverticulosis of large intestine without hemorrhage   • Chronic seasonal allergic rhinitis due to pollen   • Asymptomatic menopausal state   • Arthralgia of multiple sites   • Abnormal mammogram   • Acute pain of right shoulder   • Trapezius muscle spasm       Medication List:    Current Outpatient Medications:   •  albuterol (PROVENTIL) (2.5 MG/3ML) 0.083% nebulizer solution, Inhale As Needed., Disp: , Rfl:   •  atorvastatin (LIPITOR) 10 MG tablet, Take 1 tablet by mouth Daily., Disp: 90 tablet,  Rfl: 4  •  escitalopram (LEXAPRO) 20 MG tablet, Take 1 tablet by mouth Daily., Disp: 90 tablet, Rfl: 4  •  levothyroxine (SYNTHROID, LEVOTHROID) 88 MCG tablet, Take 1 tablet by mouth Daily., Disp: 90 tablet, Rfl: 4  •  montelukast (SINGULAIR) 10 MG tablet, Take 1 tablet by mouth Daily., Disp: 90 tablet, Rfl: 4  •  Omega-3 Fatty Acids (FISH OIL) 1000 MG capsule capsule, Take 1 capsule by mouth 3 (Three) Times a Day., Disp: , Rfl:   •  Probiotic capsule, Take 1 capsule by mouth Daily., Disp: , Rfl:   •  methylPREDNISolone (MEDROL) 4 MG dose pack, 6 tablets on day one, 5 tablets on day two, 4 tablets on day three, 3 tablets on day four, 2 tablets on day five, 1 tablet on day 6., Disp: 21 tablet, Rfl: 0  •  tiZANidine (ZANAFLEX) 4 MG tablet, Take 1 tablet by mouth At Night As Needed for Muscle Spasms., Disp: 14 tablet, Rfl: 0    Current Facility-Administered Medications:   •  methylPREDNISolone acetate (DEPO-medrol) injection 40 mg, 40 mg, Intramuscular, Once, Zhanna Dominguez MD    Past Surgical History:  Past Surgical History:   Procedure Laterality Date   • HYSTERECTOMY         Review of Systems:  Review of Systems   Constitutional: Negative for activity change and fever.   HENT: Negative for ear pain, rhinorrhea, sinus pressure and voice change.    Eyes: Negative for visual disturbance.   Respiratory: Negative for cough and shortness of breath.    Cardiovascular: Negative for chest pain.   Gastrointestinal: Negative for abdominal pain, diarrhea, nausea and vomiting.   Endocrine: Negative for cold intolerance and heat intolerance.   Genitourinary: Negative for frequency and urgency.   Musculoskeletal: Positive for back pain. Negative for arthralgias.   Skin: Negative for rash.   Neurological: Positive for tingling. Negative for syncope, weakness and numbness.   Hematological: Does not bruise/bleed easily.   Psychiatric/Behavioral: Negative for depressed mood. The patient is not nervous/anxious.        Physical  "Exam:  Vital Signs:  Visit Vitals  /80   Pulse 92   Temp 97.3 °F (36.3 °C)   Resp 18   Ht 170.2 cm (67\")   Wt 86.3 kg (190 lb 3.2 oz)   SpO2 94%   BMI 29.79 kg/m²       Physical Exam   Constitutional: She is oriented to person, place, and time. She appears well-developed and well-nourished. She is cooperative.   Cardiovascular: Normal rate, regular rhythm and normal heart sounds. Exam reveals no gallop and no friction rub.   No murmur heard.  Pulmonary/Chest: Effort normal and breath sounds normal. She has no wheezes. She has no rales.   Musculoskeletal:        Right shoulder: She exhibits decreased range of motion, tenderness, pain and spasm. She exhibits no crepitus, no deformity, normal pulse and normal strength.        Cervical back: She exhibits tenderness and spasm. She exhibits normal range of motion.   Tender with apley scratch test but not scarf test. Negative drop test.   Tender at ac joint   Neurological: She is alert and oriented to person, place, and time. Coordination normal.   Skin: Skin is warm and dry.   Psychiatric: She has a normal mood and affect.   Vitals reviewed.      Assessment and Plan:  Problem List Items Addressed This Visit        Nervous and Auditory    Acute pain of right shoulder - Primary    Current Assessment & Plan     Seems she irritated her rotator cuff but does not have signs of a tear.   Discussed risks of steroids: hyperglycemia, osteoporosis, avascular necrosis, anxiety, insomnia and cataracts. Patient states understanding  Exercises given to use at home  Follow up in 2 weeks with Dr Fishman or me         Relevant Medications    tiZANidine (ZANAFLEX) 4 MG tablet    methylPREDNISolone acetate (DEPO-medrol) injection 40 mg (Start on 9/10/2020  6:16 PM)    methylPREDNISolone (MEDROL) 4 MG dose pack       Musculoskeletal and Integument    Trapezius muscle spasm    Relevant Medications    tiZANidine (ZANAFLEX) 4 MG tablet    methylPREDNISolone acetate (DEPO-medrol) injection 40 " mg (Start on 9/10/2020  6:16 PM)    methylPREDNISolone (MEDROL) 4 MG dose pack          An After Visit Summary and PPPS were given to the patient.             I wore protective equipment throughout this patient encounter to include mask, gloves and eye protection. Hand hygiene was performed before donning protective equipment and after removal when leaving the room.

## 2020-09-10 NOTE — ASSESSMENT & PLAN NOTE
Seems she irritated her rotator cuff but does not have signs of a tear.   Discussed risks of steroids: hyperglycemia, osteoporosis, avascular necrosis, anxiety, insomnia and cataracts. Patient states understanding  Exercises given to use at home  Follow up in 2 weeks with Dr Fishman or me

## 2020-09-28 DIAGNOSIS — M62.838 TRAPEZIUS MUSCLE SPASM: ICD-10-CM

## 2020-09-28 DIAGNOSIS — M25.511 ACUTE PAIN OF RIGHT SHOULDER: ICD-10-CM

## 2020-09-28 RX ORDER — TIZANIDINE 4 MG/1
4 TABLET ORAL NIGHTLY PRN
Qty: 14 TABLET | Refills: 0 | OUTPATIENT
Start: 2020-09-28

## 2020-10-06 DIAGNOSIS — M25.511 ACUTE PAIN OF RIGHT SHOULDER: ICD-10-CM

## 2020-10-06 DIAGNOSIS — M62.838 TRAPEZIUS MUSCLE SPASM: ICD-10-CM

## 2020-10-06 RX ORDER — TIZANIDINE 4 MG/1
4 TABLET ORAL NIGHTLY PRN
Qty: 14 TABLET | Refills: 0 | OUTPATIENT
Start: 2020-10-06

## 2020-10-06 RX ORDER — TIZANIDINE 4 MG/1
4 TABLET ORAL NIGHTLY PRN
Qty: 30 TABLET | Refills: 1 | Status: SHIPPED | OUTPATIENT
Start: 2020-10-06 | End: 2020-11-03

## 2020-10-19 ENCOUNTER — FLU SHOT (OUTPATIENT)
Dept: FAMILY MEDICINE CLINIC | Facility: CLINIC | Age: 65
End: 2020-10-19

## 2020-10-19 DIAGNOSIS — Z23 NEED FOR INFLUENZA VACCINATION: ICD-10-CM

## 2020-10-19 PROCEDURE — G0008 ADMIN INFLUENZA VIRUS VAC: HCPCS | Performed by: FAMILY MEDICINE

## 2020-10-19 PROCEDURE — 90694 VACC AIIV4 NO PRSRV 0.5ML IM: CPT | Performed by: FAMILY MEDICINE

## 2020-11-03 ENCOUNTER — HOSPITAL ENCOUNTER (OUTPATIENT)
Dept: GENERAL RADIOLOGY | Facility: HOSPITAL | Age: 65
Discharge: HOME | End: 2020-11-03

## 2020-11-03 ENCOUNTER — OFFICE VISIT (OUTPATIENT)
Dept: FAMILY MEDICINE CLINIC | Facility: CLINIC | Age: 65
End: 2020-11-03

## 2020-11-03 VITALS
OXYGEN SATURATION: 96 % | WEIGHT: 194.4 LBS | RESPIRATION RATE: 18 BRPM | HEART RATE: 95 BPM | HEIGHT: 67 IN | DIASTOLIC BLOOD PRESSURE: 80 MMHG | SYSTOLIC BLOOD PRESSURE: 118 MMHG | BODY MASS INDEX: 30.51 KG/M2 | TEMPERATURE: 97.8 F

## 2020-11-03 DIAGNOSIS — M50.90 CERVICAL DISC DISEASE: ICD-10-CM

## 2020-11-03 DIAGNOSIS — Z12.31 ENCOUNTER FOR SCREENING MAMMOGRAM FOR BREAST CANCER: Primary | ICD-10-CM

## 2020-11-03 DIAGNOSIS — M25.511 CHRONIC RIGHT SHOULDER PAIN: ICD-10-CM

## 2020-11-03 DIAGNOSIS — M54.2 NECK PAIN: ICD-10-CM

## 2020-11-03 DIAGNOSIS — E78.2 MIXED HYPERLIPIDEMIA: ICD-10-CM

## 2020-11-03 DIAGNOSIS — G89.29 CHRONIC RIGHT SHOULDER PAIN: ICD-10-CM

## 2020-11-03 DIAGNOSIS — E03.9 ACQUIRED HYPOTHYROIDISM: ICD-10-CM

## 2020-11-03 DIAGNOSIS — G89.29 CHRONIC RIGHT SHOULDER PAIN: Primary | ICD-10-CM

## 2020-11-03 DIAGNOSIS — M25.511 CHRONIC RIGHT SHOULDER PAIN: Primary | ICD-10-CM

## 2020-11-03 PROCEDURE — 73030 X-RAY EXAM OF SHOULDER: CPT

## 2020-11-03 PROCEDURE — 72050 X-RAY EXAM NECK SPINE 4/5VWS: CPT

## 2020-11-03 PROCEDURE — 99214 OFFICE O/P EST MOD 30 MIN: CPT | Performed by: FAMILY MEDICINE

## 2020-11-03 RX ORDER — PREDNISONE 10 MG/1
10 TABLET ORAL TAKE AS DIRECTED
Qty: 35 TABLET | Refills: 0 | Status: SHIPPED | OUTPATIENT
Start: 2020-11-03 | End: 2020-11-18

## 2020-11-03 NOTE — PROGRESS NOTES
Subjective   Genevieve Kuo is a 65 y.o. female.     Chief Complaint   Patient presents with   • Arm Pain   • Hyperlipidemia   • Hypothyroidism       Arm Pain   The incident occurred more than 1 week ago. The incident occurred at home. The injury mechanism is unknown. The pain is present in the right shoulder. The pain radiates to the right arm, right hand and right neck. The pain is at a severity of 7/10. The pain is moderate. The pain has been constant since the incident. Associated symptoms include numbness (left upper extremity at times lasts severeal mins. and disappears spontaneously. The pain is electric. ) and tingling. Pertinent negatives include no chest pain. The symptoms are aggravated by movement and lifting. She has tried NSAIDs for the symptoms. The treatment provided mild relief.   Hyperlipidemia  This is a chronic problem. The current episode started more than 1 year ago. The problem is uncontrolled. Recent lipid tests were reviewed and are variable. Exacerbating diseases include hypothyroidism. She has no history of diabetes or obesity. Pertinent negatives include no chest pain, myalgias or shortness of breath. Current antihyperlipidemic treatment includes statins and herbal therapy. Risk factors for coronary artery disease include dyslipidemia, post-menopausal and family history.   Hypothyroidism  This is a chronic problem. The current episode started more than 1 year ago. Associated symptoms include arthralgias (left shoulder), headaches, neck pain and numbness (left upper extremity at times lasts severeal mins. and disappears spontaneously. The pain is electric. ). Pertinent negatives include no abdominal pain, chest pain, congestion, coughing, fatigue, fever, joint swelling, myalgias, nausea, rash, sore throat, vomiting or weakness. Treatments tried: Levothyroxine 88MCG. The treatment provided moderate relief.        The following portions of the patient's history were reviewed and updated as  appropriate: allergies, current medications, past family history, past medical history, past social history, past surgical history and problem list.    Allergies:  Allergies   Allergen Reactions   • Aspirin Unknown - Low Severity   • Ibuprofen Unknown - Low Severity       Social History:  Social History     Socioeconomic History   • Marital status:      Spouse name: Not on file   • Number of children: Not on file   • Years of education: Not on file   • Highest education level: Not on file   Tobacco Use   • Smoking status: Never Smoker   • Smokeless tobacco: Never Used   Substance and Sexual Activity   • Alcohol use: No     Frequency: Never   • Drug use: No   • Sexual activity: Defer       Family History:  Family History   Problem Relation Age of Onset   • Colon cancer Mother    • Hypertension Mother    • Ovarian cancer Mother    • Colon cancer Father    • COPD Father    • Lung cancer Father    • Emphysema Father    • Heart disease Brother    • Hypertension Brother    • COPD Brother    • Multiple sclerosis Daughter        Past Medical History :  Patient Active Problem List   Diagnosis   • Mixed hyperlipidemia   • Breast mass, right   • Acquired hypothyroidism   • Special screening for malignant neoplasms, colon   • Tubular adenoma of colon   • Supraclavicular mass   • Screening for malignant neoplasm of cervix   • Overweight with body mass index (BMI) 25.0-29.9   • Menopausal syndrome   • Mass of right breast on mammogram   • Lumbar disc disease with radiculopathy   • Irritable colon   • Intervertebral lumbar disc disorder with myelopathy, lumbar region   • Fibrocystic changes of right breast   • Family history of malignant neoplasm of gastrointestinal tract   • Encounter for screening   • Encounter for routine adult medical exam with abnormal findings   • Encounter for screening for malignant neoplasm of rectum   • Counseling regarding end of life decision making   • Diverticulosis of large intestine without  hemorrhage   • Chronic seasonal allergic rhinitis due to pollen   • Asymptomatic menopausal state   • Arthralgia of multiple sites   • Abnormal mammogram   • Acute pain of right shoulder   • Trapezius muscle spasm   • Cervical disc disease       Medication List:  Outpatient Encounter Medications as of 11/3/2020   Medication Sig Dispense Refill   • albuterol (PROVENTIL) (2.5 MG/3ML) 0.083% nebulizer solution Inhale As Needed.     • atorvastatin (LIPITOR) 10 MG tablet Take 1 tablet by mouth Daily. 90 tablet 4   • escitalopram (LEXAPRO) 20 MG tablet Take 1 tablet by mouth Daily. 90 tablet 4   • levothyroxine (SYNTHROID, LEVOTHROID) 88 MCG tablet Take 1 tablet by mouth Daily. 90 tablet 4   • montelukast (SINGULAIR) 10 MG tablet Take 1 tablet by mouth Daily. 90 tablet 4   • Omega-3 Fatty Acids (FISH OIL) 1000 MG capsule capsule Take 1 capsule by mouth 3 (Three) Times a Day.     • Probiotic capsule Take 1 capsule by mouth Daily.     • predniSONE (DELTASONE) 10 MG tablet Take 1 tablet by mouth Take As Directed for 15 days. 5 tab x 1day, 4 tab x 2 day, 3 tab x 3 day, 2 tab x 4 day, 1 tab x 5 day 35 tablet 0   • [DISCONTINUED] methylPREDNISolone (MEDROL) 4 MG dose pack 6 tablets on day one, 5 tablets on day two, 4 tablets on day three, 3 tablets on day four, 2 tablets on day five, 1 tablet on day 6. 21 tablet 0   • [DISCONTINUED] tiZANidine (ZANAFLEX) 4 MG tablet Take 1 tablet by mouth At Night As Needed for Muscle Spasms. 30 tablet 1     No facility-administered encounter medications on file as of 11/3/2020.        Past Surgical History:  Past Surgical History:   Procedure Laterality Date   • HYSTERECTOMY         Review of Systems:  Review of Systems   Constitutional: Negative for appetite change, fatigue, fever, unexpected weight gain and unexpected weight loss.   HENT: Positive for sinus pressure. Negative for congestion and sore throat.    Respiratory: Negative for cough, shortness of breath and wheezing.   "  Cardiovascular: Negative for chest pain, palpitations and leg swelling.   Gastrointestinal: Negative for abdominal pain, constipation, diarrhea, nausea and vomiting.   Endocrine: Negative.  Negative for cold intolerance, heat intolerance, polydipsia and polyuria.   Musculoskeletal: Positive for arthralgias (left shoulder) and neck pain. Negative for joint swelling and myalgias.   Skin: Negative for rash and bruise.   Allergic/Immunologic: Negative for environmental allergies.   Neurological: Positive for tingling and numbness (left upper extremity at times lasts severeal mins. and disappears spontaneously. The pain is electric. ). Negative for dizziness, syncope, weakness and headache.   Hematological: Negative for adenopathy. Does not bruise/bleed easily.       I have reviewed and confirmed the accuracy of the HPI and ROS as documented by the MA/LPN/RN Sade Fishman MD    Vital Signs:  Visit Vitals  /80 (BP Location: Left arm, Patient Position: Sitting, Cuff Size: Adult)   Pulse 95   Temp 97.8 °F (36.6 °C) (Temporal)   Resp 18   Ht 170.2 cm (67\")   Wt 88.2 kg (194 lb 6.4 oz)   SpO2 96% Comment: room air   BMI 30.45 kg/m²       Physical Exam  Vitals signs reviewed.   Constitutional:       General: She is not in acute distress.     Appearance: She is well-developed.   Eyes:      Conjunctiva/sclera: Conjunctivae normal.   Neck:      Musculoskeletal: Neck supple.      Thyroid: No thyromegaly.      Vascular: No JVD.   Cardiovascular:      Rate and Rhythm: Normal rate and regular rhythm.      Pulses:           Radial pulses are 2+ on the right side and 2+ on the left side.        Dorsalis pedis pulses are 2+ on the right side and 2+ on the left side.      Heart sounds: Normal heart sounds. No murmur. No friction rub. No gallop.       Comments: Negative Juan J's  Pulmonary:      Effort: Pulmonary effort is normal. No respiratory distress.      Breath sounds: Normal breath sounds. No wheezing or rales. "   Musculoskeletal:         General: No swelling or tenderness.      Left shoulder: She exhibits decreased range of motion (There is a passive FROM). She exhibits no tenderness, no bony tenderness, no swelling, no crepitus, no deformity and normal pulse.      Cervical back: She exhibits decreased range of motion. She exhibits no tenderness, no bony tenderness, no swelling, no edema, no deformity and normal pulse.      Right lower leg: No edema.      Left lower leg: No edema.   Lymphadenopathy:      Cervical: No cervical adenopathy.   Skin:     General: Skin is warm.      Findings: No erythema or rash.   Neurological:      Mental Status: She is alert and oriented to person, place, and time.      Coordination: Coordination normal.      Deep Tendon Reflexes: Reflexes normal.         Assessment and Plan:  Problem List Items Addressed This Visit        Medium    Acquired hypothyroidism    Overview     TSH 0.11, 04/19/2019, she declines decreasing dose will repeat in 6 mos.   No sxs.         Relevant Medications    predniSONE (DELTASONE) 10 MG tablet       Unprioritized    Mixed hyperlipidemia    Overview     decreased HDL, 31,  Diet controlled. She is encouraged to increase exercise.         Cervical disc disease    Overview     Multiple level mild disease with neural foramina narrowing 11/03/2020  Systemic steroids again and follow.   MRI if no improvement.          Relevant Medications    predniSONE (DELTASONE) 10 MG tablet      Other Visit Diagnoses     Chronic right shoulder pain    -  Primary    Negative exam, negative xray, radicular and secondar the CDD    Relevant Orders    XR Shoulder 2+ View Right (Completed)    XR Spine Cervical Complete 4 or 5 View (Completed)    Neck pain              An After Visit Summary and PPPS were given to the patient.       I wore protective equipment throughout this patient encounter to include mask and eye protection. Hand hygiene was performed before donning protective equipment  and after removal when leaving the room.

## 2020-11-16 ENCOUNTER — HOSPITAL ENCOUNTER (OUTPATIENT)
Dept: MAMMOGRAPHY | Facility: HOSPITAL | Age: 65
Discharge: HOME | End: 2020-11-16
Admitting: FAMILY MEDICINE

## 2020-11-16 DIAGNOSIS — Z12.31 ENCOUNTER FOR SCREENING MAMMOGRAM FOR BREAST CANCER: ICD-10-CM

## 2020-11-16 PROCEDURE — 77063 BREAST TOMOSYNTHESIS BI: CPT

## 2020-11-16 PROCEDURE — 77067 SCR MAMMO BI INCL CAD: CPT

## 2020-11-18 ENCOUNTER — OFFICE VISIT (OUTPATIENT)
Dept: FAMILY MEDICINE CLINIC | Facility: CLINIC | Age: 65
End: 2020-11-18

## 2020-11-18 VITALS
OXYGEN SATURATION: 96 % | WEIGHT: 196.2 LBS | BODY MASS INDEX: 30.79 KG/M2 | TEMPERATURE: 97.5 F | RESPIRATION RATE: 16 BRPM | HEART RATE: 90 BPM | HEIGHT: 67 IN | SYSTOLIC BLOOD PRESSURE: 112 MMHG | DIASTOLIC BLOOD PRESSURE: 60 MMHG

## 2020-11-18 DIAGNOSIS — M50.90 CERVICAL DISC DISEASE: ICD-10-CM

## 2020-11-18 DIAGNOSIS — J30.1 CHRONIC SEASONAL ALLERGIC RHINITIS DUE TO POLLEN: ICD-10-CM

## 2020-11-18 DIAGNOSIS — Z12.4 SCREENING FOR MALIGNANT NEOPLASM OF CERVIX: ICD-10-CM

## 2020-11-18 DIAGNOSIS — Z12.11 SPECIAL SCREENING FOR MALIGNANT NEOPLASMS, COLON: ICD-10-CM

## 2020-11-18 DIAGNOSIS — Z12.31 ENCOUNTER FOR SCREENING MAMMOGRAM FOR MALIGNANT NEOPLASM OF BREAST: ICD-10-CM

## 2020-11-18 DIAGNOSIS — M25.50 ARTHRALGIA OF MULTIPLE SITES: ICD-10-CM

## 2020-11-18 DIAGNOSIS — E03.9 ACQUIRED HYPOTHYROIDISM: ICD-10-CM

## 2020-11-18 DIAGNOSIS — M51.06 INTERVERTEBRAL LUMBAR DISC DISORDER WITH MYELOPATHY, LUMBAR REGION: ICD-10-CM

## 2020-11-18 DIAGNOSIS — Z23 NEED FOR PNEUMOCOCCAL VACCINATION: ICD-10-CM

## 2020-11-18 DIAGNOSIS — Z00.00 MEDICARE ANNUAL WELLNESS VISIT, SUBSEQUENT: Primary | ICD-10-CM

## 2020-11-18 DIAGNOSIS — M25.511 ACUTE PAIN OF RIGHT SHOULDER: ICD-10-CM

## 2020-11-18 DIAGNOSIS — Z78.0 ASYMPTOMATIC MENOPAUSAL STATE: ICD-10-CM

## 2020-11-18 DIAGNOSIS — F41.9 ANXIETY: ICD-10-CM

## 2020-11-18 DIAGNOSIS — Z11.59 NEED FOR HEPATITIS C SCREENING TEST: ICD-10-CM

## 2020-11-18 DIAGNOSIS — E66.3 OVERWEIGHT WITH BODY MASS INDEX (BMI) 25.0-29.9: ICD-10-CM

## 2020-11-18 DIAGNOSIS — E78.2 MIXED HYPERLIPIDEMIA: ICD-10-CM

## 2020-11-18 PROBLEM — R22.2 SUPRACLAVICULAR MASS: Status: RESOLVED | Noted: 2019-08-28 | Resolved: 2020-11-18

## 2020-11-18 PROBLEM — R92.8 ABNORMAL MAMMOGRAM: Status: RESOLVED | Noted: 2019-08-28 | Resolved: 2020-11-18

## 2020-11-18 LAB
BILIRUB BLD-MCNC: NEGATIVE MG/DL
CLARITY, POC: CLEAR
COLOR UR: YELLOW
GLUCOSE UR STRIP-MCNC: NEGATIVE MG/DL
KETONES UR QL: NEGATIVE
LEUKOCYTE EST, POC: NEGATIVE
NITRITE UR-MCNC: NEGATIVE MG/ML
PH UR: 6 [PH] (ref 5–8)
PROT UR STRIP-MCNC: NEGATIVE MG/DL
RBC # UR STRIP: NEGATIVE /UL
SP GR UR: 1.01 (ref 1–1.03)
UROBILINOGEN UR QL: NORMAL

## 2020-11-18 PROCEDURE — G0009 ADMIN PNEUMOCOCCAL VACCINE: HCPCS | Performed by: FAMILY MEDICINE

## 2020-11-18 PROCEDURE — G0444 DEPRESSION SCREEN ANNUAL: HCPCS | Performed by: FAMILY MEDICINE

## 2020-11-18 PROCEDURE — 36415 COLL VENOUS BLD VENIPUNCTURE: CPT | Performed by: FAMILY MEDICINE

## 2020-11-18 PROCEDURE — 99497 ADVNCD CARE PLAN 30 MIN: CPT | Performed by: FAMILY MEDICINE

## 2020-11-18 PROCEDURE — 99213 OFFICE O/P EST LOW 20 MIN: CPT | Performed by: FAMILY MEDICINE

## 2020-11-18 PROCEDURE — 90732 PPSV23 VACC 2 YRS+ SUBQ/IM: CPT | Performed by: FAMILY MEDICINE

## 2020-11-18 PROCEDURE — G0439 PPPS, SUBSEQ VISIT: HCPCS | Performed by: FAMILY MEDICINE

## 2020-11-18 PROCEDURE — 81002 URINALYSIS NONAUTO W/O SCOPE: CPT | Performed by: FAMILY MEDICINE

## 2020-11-18 RX ORDER — ESCITALOPRAM OXALATE 20 MG/1
20 TABLET ORAL DAILY
Qty: 90 TABLET | Refills: 4 | Status: SHIPPED | OUTPATIENT
Start: 2020-11-18 | End: 2021-12-09

## 2020-11-18 RX ORDER — ATORVASTATIN CALCIUM 10 MG/1
10 TABLET, FILM COATED ORAL DAILY
Qty: 90 TABLET | Refills: 4 | Status: SHIPPED | OUTPATIENT
Start: 2020-11-18 | End: 2022-01-03

## 2020-11-18 RX ORDER — MONTELUKAST SODIUM 10 MG/1
10 TABLET ORAL DAILY
Qty: 90 TABLET | Refills: 4 | Status: SHIPPED | OUTPATIENT
Start: 2020-11-18 | End: 2022-01-03

## 2020-11-18 RX ORDER — GABAPENTIN 300 MG/1
300 CAPSULE ORAL 2 TIMES DAILY
Qty: 60 CAPSULE | Refills: 12 | Status: SHIPPED | OUTPATIENT
Start: 2020-11-18 | End: 2022-01-03

## 2020-11-18 RX ORDER — LEVOTHYROXINE SODIUM 88 UG/1
88 TABLET ORAL DAILY
Qty: 90 TABLET | Refills: 4 | Status: SHIPPED | OUTPATIENT
Start: 2020-11-18 | End: 2021-12-29 | Stop reason: DRUGHIGH

## 2020-11-19 ENCOUNTER — TREATMENT (OUTPATIENT)
Dept: PHYSICAL THERAPY | Facility: CLINIC | Age: 65
End: 2020-11-19

## 2020-11-19 DIAGNOSIS — M50.90 DISC DISORDER OF CERVICAL REGION: Primary | ICD-10-CM

## 2020-11-19 LAB
ALBUMIN SERPL-MCNC: 4 G/DL (ref 3.8–4.8)
ALBUMIN/GLOB SERPL: 1.7 {RATIO} (ref 1.2–2.2)
ALP SERPL-CCNC: 108 IU/L (ref 39–117)
ALT SERPL-CCNC: 18 IU/L (ref 0–32)
AST SERPL-CCNC: 16 IU/L (ref 0–40)
BASOPHILS # BLD AUTO: 0 X10E3/UL (ref 0–0.2)
BASOPHILS NFR BLD AUTO: 0 %
BILIRUB SERPL-MCNC: 0.3 MG/DL (ref 0–1.2)
BUN SERPL-MCNC: 21 MG/DL (ref 8–27)
BUN/CREAT SERPL: 29 (ref 12–28)
CALCIUM SERPL-MCNC: 9.4 MG/DL (ref 8.7–10.3)
CHLORIDE SERPL-SCNC: 103 MMOL/L (ref 96–106)
CHOLEST SERPL-MCNC: 163 MG/DL (ref 100–199)
CHOLEST/HDLC SERPL: 2.9 RATIO (ref 0–4.4)
CO2 SERPL-SCNC: 25 MMOL/L (ref 20–29)
CREAT SERPL-MCNC: 0.72 MG/DL (ref 0.57–1)
EOSINOPHIL # BLD AUTO: 0.3 X10E3/UL (ref 0–0.4)
EOSINOPHIL NFR BLD AUTO: 3 %
ERYTHROCYTE [DISTWIDTH] IN BLOOD BY AUTOMATED COUNT: 13.1 % (ref 11.7–15.4)
GLOBULIN SER CALC-MCNC: 2.4 G/DL (ref 1.5–4.5)
GLUCOSE SERPL-MCNC: 80 MG/DL (ref 65–99)
HCT VFR BLD AUTO: 41.8 % (ref 34–46.6)
HCV AB S/CO SERPL IA: <0.1 S/CO RATIO (ref 0–0.9)
HDLC SERPL-MCNC: 56 MG/DL
HGB BLD-MCNC: 13.9 G/DL (ref 11.1–15.9)
IMM GRANULOCYTES # BLD AUTO: 0.1 X10E3/UL (ref 0–0.1)
IMM GRANULOCYTES NFR BLD AUTO: 1 %
LDLC SERPL CALC-MCNC: 57 MG/DL (ref 0–99)
LYMPHOCYTES # BLD AUTO: 2.1 X10E3/UL (ref 0.7–3.1)
LYMPHOCYTES NFR BLD AUTO: 23 %
MCH RBC QN AUTO: 30.4 PG (ref 26.6–33)
MCHC RBC AUTO-ENTMCNC: 33.3 G/DL (ref 31.5–35.7)
MCV RBC AUTO: 92 FL (ref 79–97)
MONOCYTES # BLD AUTO: 0.6 X10E3/UL (ref 0.1–0.9)
MONOCYTES NFR BLD AUTO: 6 %
NEUTROPHILS # BLD AUTO: 6.1 X10E3/UL (ref 1.4–7)
NEUTROPHILS NFR BLD AUTO: 67 %
PLATELET # BLD AUTO: 282 X10E3/UL (ref 150–450)
POTASSIUM SERPL-SCNC: 4.8 MMOL/L (ref 3.5–5.2)
PROT SERPL-MCNC: 6.4 G/DL (ref 6–8.5)
RBC # BLD AUTO: 4.57 X10E6/UL (ref 3.77–5.28)
SODIUM SERPL-SCNC: 140 MMOL/L (ref 134–144)
TRIGL SERPL-MCNC: 328 MG/DL (ref 0–149)
TSH SERPL DL<=0.005 MIU/L-ACNC: 0.53 UIU/ML (ref 0.45–4.5)
VLDLC SERPL CALC-MCNC: 50 MG/DL (ref 5–40)
WBC # BLD AUTO: 9.1 X10E3/UL (ref 3.4–10.8)

## 2020-11-19 PROCEDURE — 97161 PT EVAL LOW COMPLEX 20 MIN: CPT | Performed by: PHYSICAL THERAPIST

## 2020-11-19 PROCEDURE — 97140 MANUAL THERAPY 1/> REGIONS: CPT | Performed by: PHYSICAL THERAPIST

## 2020-11-19 NOTE — PROGRESS NOTES
Physical Therapy Initial Evaluation and Plan of Care    Patient: Genevieve Kuo   : 1955  Diagnosis/ICD-10 Code:  Disc disorder of cervical region [M50.90]  Referring practitioner: Sade Fishman MD  Date of Initial Visit: 2020  Today's Date: 2020  Patient seen for 1 sessions           Subjective Questionnaire: NDI:      Subjective Evaluation    History of Present Illness  Mechanism of injury: Pt is a 64 yo female with c/o increased neck and RUE pain.  Most of the time pain into R upper shoulder or under arm/post arm at times will have pain that radiates into hand.  Pain began in August.  Was helping by lifting a calf that couldn't get up on his own.  Was lifting him 2 x / day and started having pain into RUE.  Once pain started it hasn't really gotten better.  Did take 2 rounds of cortisone that helped her be able to move better.  Finished last round on Friday and pain returned as it had been previously.  To have MRI on 20 at [facility].  Planning to try PT then possibly pain management as needed.  Current pain at rest /10 with pins/needles/tingling into post hand and pain into ant upper arm.  At times painful into post arm and upper trap.  Pt is R handed.  Due to pain pt noted she tends to carry more with her LUE now.  Most comfortable position is L sidelying.  Does have some trouble when she is sitting in her recliner with her head turned slightly to the R.  Discussed adjusting chair to face the TV more directly.  Not able to lift as much as she used to.  Also has some trouble with sweeping and mopping causing inc pain into neck and back.      Pain  Current pain ratin  Quality: dull ache and radiating  Relieving factors: medications, heat, ice and change in position  Progression: no change    Social Support  Lives with: spouse    Diagnostic Tests  X-ray: abnormal    Treatments  Previous treatment: medication  Current treatment: physical therapy  Patient Goals  Patient goals  for therapy: decreased pain, increased motion, increased strength and independence with ADLs/IADLs             Objective    Radiating pain into upper trap, ant/post upper arm with occasional radiating pain into hand         Active Range of Motion     Additional Active Range of Motion Details  Cervical AROM   Flex WFL  Ext 75%  RSB 25% with inc pain  LSB 50%  R Rot 75%  L Rot 75%      Strength/Myotome Testing     Left Shoulder     Planes of Motion   Flexion: 4+   Abduction: 4+   External rotation at 0°: 4+   Internal rotation at 0°: 4+     Right Shoulder     Planes of Motion   Flexion: 4   Abduction: 4   External rotation at 0°: 4   Internal rotation at 0°: 4     Left Elbow   Flexion: 4+  Extension: 4+    Right Elbow   Flexion: 4+  Extension: 4+    Left Wrist/Hand   Wrist extension: 4+  Wrist flexion: 4+    Right Wrist/Hand   Wrist extension: 4+  Wrist flexion: 4+          Assessment & Plan     Assessment  Impairments: abnormal or restricted ROM, impaired physical strength and pain with function  Assessment details: Pt presents with neck pain and radiating RUE pain.  Difficulty lifting heavy items.  Decreased cervical ROM.  Slight dec RUE strength.    Prognosis: good  Functional Limitations: carrying objects, lifting, sleeping and reaching overhead  Goals  Plan Goals: STG  Pt I with HEP in 2 weeks  To dem cervical AROM WFL with no inc pain in 2-3 weeks  To report no radicular symptoms in 3-4 weeks.  LTG  To tolerate sleep with no inc pain/radicular symptoms in 4-6 weeks.  To dec pain in neck/UE 0-1/10 max in 4-6 weeks.  To tolerate ADLs in home and on farm with no inc pain in 4-6 weeks.      Plan  Therapy options: will be seen for skilled physical therapy services  Planned modality interventions: cryotherapy, electrical stimulation/Russian stimulation, thermotherapy (hydrocollator packs), ultrasound, traction and dry needling  Planned therapy interventions: flexibility, functional ROM exercises, home exercise  program, manual therapy, postural training, spinal/joint mobilization, strengthening, stretching and therapeutic activities  Frequency: 2x week  Duration in visits: 20  Treatment plan discussed with: patient  Plan details: Began with gentle stretching and active mobility.  May add traction or modalities as needed for pain control.  Continue manual as needed to reduce radiating pain.          Timed:         Manual Therapy:    15     mins  40903;     Therapeutic Exercise:    5     mins  44151;     Neuromuscular Silvana:        mins  98320;    Therapeutic Activity:          mins  81782;     Gait Training:           mins  56296;     Ultrasound:          mins  40100;    Ionto                                   mins   [id number]    Un-Timed:  Electrical Stimulation:         mins  11029 ( );  Dry Needling          mins self-pay  Traction          mins 31841  Low Eval     40     Mins  50056  Mod Eval          Mins  99645  High Eval                            Mins  24110        Timed Treatment:   20   mins   Total Treatment:     60   mins    PT SIGNATURE: Alma Pastor, PT   DATE TREATMENT INITIATED: 11/19/2020    Medicare Initial Certification  Certification Period: 2/17/2021  I certify that the therapy services are furnished while this patient is under my care.  The services outlined above are required by this patient, and will be reviewed every 90 days.     PHYSICIAN: Sade Fishman MD      DATE:     Please sign and return via fax to [phone].. Thank you, [facility].

## 2020-11-23 ENCOUNTER — TREATMENT (OUTPATIENT)
Dept: PHYSICAL THERAPY | Facility: CLINIC | Age: 65
End: 2020-11-23

## 2020-11-23 DIAGNOSIS — M50.90 DISC DISORDER OF CERVICAL REGION: Primary | ICD-10-CM

## 2020-11-23 PROCEDURE — 97140 MANUAL THERAPY 1/> REGIONS: CPT | Performed by: PHYSICAL THERAPIST

## 2020-11-23 PROCEDURE — G0283 ELEC STIM OTHER THAN WOUND: HCPCS | Performed by: PHYSICAL THERAPIST

## 2020-11-23 PROCEDURE — 97012 MECHANICAL TRACTION THERAPY: CPT | Performed by: PHYSICAL THERAPIST

## 2020-11-23 NOTE — PROGRESS NOTES
Physical Therapy Daily Progress Note      Patient: Genevieve Kuo   : 1955  Diagnosis/ICD-10 Code:  Disc disorder of cervical region [M50.90]   Problems Addressed this Visit     None      Visit Diagnoses     Disc disorder of cervical region    -  Primary      Diagnoses       Codes Comments    Disc disorder of cervical region    -  Primary ICD-10-CM: M50.90  ICD-9-CM: 722.91          Referring practitioner: Sade Fishman MD  Date of Initial Visit: Type: THERAPY  Noted: 2020  Today's Date: 2020    VISIT#: 2    Subjective Patient reports having continued radicular symptoms down R arm and pain at base of neck.       Objective Added MHP premod estim bilateral upper traps, static mechanical cervical traction 15#     See Exercise, Manual, and Modality Logs for complete treatment.     Assessment/Plan Patient responded well to added cervical traction with no reports of increased symptoms. Patient with continued radicular symptoms and will continue to benefit from traction and manual interventions for decreased symptoms.     Progress per Plan of Care         Timed:         Manual Therapy:    15     mins  73415;     Therapeutic Exercise:    5     mins  82766;     Neuromuscular Silvana:        mins  48037;    Therapeutic Activity:          mins  75190;     Gait Training:           mins  91960;     Ultrasound:          mins  38430;    Ionto                                   mins   22665  Canalith Repos                   mins  44827    Un-Timed:  Electrical Stimulation:    15     mins  22880 ( );  Dry Needling          mins self-pay  Traction     15     mins 72140    Timed Treatment:   20   mins   Total Treatment:     50   mins    Vivek Cee PTA  Physical Therapist

## 2020-12-01 ENCOUNTER — TREATMENT (OUTPATIENT)
Dept: PHYSICAL THERAPY | Facility: CLINIC | Age: 65
End: 2020-12-01

## 2020-12-01 DIAGNOSIS — M50.90 CERVICAL DISC DISEASE: ICD-10-CM

## 2020-12-01 DIAGNOSIS — M50.90 DISC DISORDER OF CERVICAL REGION: Primary | ICD-10-CM

## 2020-12-01 PROBLEM — M62.838 TRAPEZIUS MUSCLE SPASM: Status: RESOLVED | Noted: 2020-09-10 | Resolved: 2020-12-01

## 2020-12-01 PROBLEM — N63.10 MASS OF RIGHT BREAST ON MAMMOGRAM: Status: RESOLVED | Noted: 2019-08-28 | Resolved: 2020-12-01

## 2020-12-01 PROCEDURE — G0283 ELEC STIM OTHER THAN WOUND: HCPCS | Performed by: PHYSICAL THERAPIST

## 2020-12-01 PROCEDURE — 97140 MANUAL THERAPY 1/> REGIONS: CPT | Performed by: PHYSICAL THERAPIST

## 2020-12-01 PROCEDURE — 97012 MECHANICAL TRACTION THERAPY: CPT | Performed by: PHYSICAL THERAPIST

## 2020-12-01 NOTE — PROGRESS NOTES
Physical Therapy Daily Progress Note      Patient: Genevieve Kuo   : 1955  Diagnosis/ICD-10 Code:  Disc disorder of cervical region [M50.90]   Problems Addressed this Visit     None      Visit Diagnoses     Disc disorder of cervical region    -  Primary      Diagnoses       Codes Comments    Disc disorder of cervical region    -  Primary ICD-10-CM: M50.90  ICD-9-CM: 722.91          Referring practitioner: Sade Fishman MD  Date of Initial Visit: Type: THERAPY  Noted: 2020  Today's Date: 2020    VISIT#: 3    Subjective Patient reports mild but manageable muscle soreness following last visit. Has noticed decreased frequency of radicular symptoms occurrence.       Objective began with MHP/estim, followed by manual interventions and finishing with traction.     See Exercise, Manual, and Modality Logs for complete treatment.     Assessment/Plan Patient tolerated traction and manual interventions well with decreased aching symptoms reported.     Progress per Plan of Care         Timed:         Manual Therapy:    15     mins  20276;     Therapeutic Exercise:    5     mins  72087;     Neuromuscular Silvana:        mins  91880;    Therapeutic Activity:          mins  68689;     Gait Training:           mins  96241;     Ultrasound:          mins  31702;    Ionto                                   mins   65760  Canalith Repos                   mins  35099    Un-Timed:  Electrical Stimulation:    15     mins  08895 ( );  Dry Needling          mins self-pay  Traction     15     mins 86891    Timed Treatment:   20   mins   Total Treatment:     50   mins    Vivek Cee PTA  Physical Therapist

## 2020-12-02 ENCOUNTER — TELEPHONE (OUTPATIENT)
Dept: FAMILY MEDICINE CLINIC | Facility: CLINIC | Age: 65
End: 2020-12-02

## 2020-12-02 NOTE — TELEPHONE ENCOUNTER
Spoke with Genevieve, per Dr Fishman,  MRI of C spine shows  multi-level degenerative  disc and degenerative changes most significant levels are C 4-5 C 6-7. We   can arrange a neurosurgical referral, . Genevieve is in physical therapy now and Pain management appointment was arranged  for next week.       Genevieve wants to   complete physical therapy and see what pain management  Has to offer before a neurosurgical referral. Genevieve will call office if she wants referral.

## 2020-12-03 ENCOUNTER — TREATMENT (OUTPATIENT)
Dept: PHYSICAL THERAPY | Facility: CLINIC | Age: 65
End: 2020-12-03

## 2020-12-03 DIAGNOSIS — M50.90 DISC DISORDER OF CERVICAL REGION: Primary | ICD-10-CM

## 2020-12-03 PROCEDURE — 97140 MANUAL THERAPY 1/> REGIONS: CPT | Performed by: PHYSICAL THERAPIST

## 2020-12-03 PROCEDURE — G0283 ELEC STIM OTHER THAN WOUND: HCPCS | Performed by: PHYSICAL THERAPIST

## 2020-12-03 PROCEDURE — 97012 MECHANICAL TRACTION THERAPY: CPT | Performed by: PHYSICAL THERAPIST

## 2020-12-03 NOTE — PROGRESS NOTES
Physical Therapy Daily Progress Note      Patient: Genevieve Kuo   : 1955  Diagnosis/ICD-10 Code:  Disc disorder of cervical region [M50.90]   Problems Addressed this Visit     None      Visit Diagnoses     Disc disorder of cervical region    -  Primary      Diagnoses       Codes Comments    Disc disorder of cervical region    -  Primary ICD-10-CM: M50.90  ICD-9-CM: 722.91          Referring practitioner: Sade Fishman MD  Date of Initial Visit: Type: THERAPY  Noted: 2020  Today's Date: 12/3/2020    VISIT#: 4    Subjective Patient reports having continued pain in neck but has noticed decreased occurrence of radicular symptoms.       Objective Progressed cervical traction to 17#    See Exercise, Manual, and Modality Logs for complete treatment.     Assessment/Plan Patient tolerated progressed cervical traction well with no reports of increased symptoms. Decreased occurrence of radicular symptoms reported with more centralized symptoms.     Progress per Plan of Care         Timed:         Manual Therapy:    15     mins  60438;     Therapeutic Exercise:    5     mins  91043;     Neuromuscular Silvana:        mins  93805;    Therapeutic Activity:          mins  18905;     Gait Training:           mins  51887;     Ultrasound:          mins  86858;    Ionto                                   mins   85528  Canalith Repos                   mins  48924    Un-Timed:  Electrical Stimulation:    15     mins  51316 (MC );  Dry Needling          mins self-pay  Traction     15     mins 90982    Timed Treatment:   20   mins   Total Treatment:     50   mins    Vivek Cee PTA  Physical Therapist

## 2020-12-08 ENCOUNTER — TREATMENT (OUTPATIENT)
Dept: PHYSICAL THERAPY | Facility: CLINIC | Age: 65
End: 2020-12-08

## 2020-12-08 DIAGNOSIS — M50.90 DISC DISORDER OF CERVICAL REGION: Primary | ICD-10-CM

## 2020-12-08 PROCEDURE — G0283 ELEC STIM OTHER THAN WOUND: HCPCS | Performed by: PHYSICAL THERAPIST

## 2020-12-08 PROCEDURE — 97012 MECHANICAL TRACTION THERAPY: CPT | Performed by: PHYSICAL THERAPIST

## 2020-12-08 PROCEDURE — 97140 MANUAL THERAPY 1/> REGIONS: CPT | Performed by: PHYSICAL THERAPIST

## 2020-12-08 NOTE — PROGRESS NOTES
Physical Therapy Daily Progress Note      Patient: Genevieve Kuo   : 1955  Diagnosis/ICD-10 Code:  Disc disorder of cervical region [M50.90]   Problems Addressed this Visit     None      Visit Diagnoses     Disc disorder of cervical region    -  Primary      Diagnoses       Codes Comments    Disc disorder of cervical region    -  Primary ICD-10-CM: M50.90  ICD-9-CM: 722.91          Referring practitioner: Sade Fishman MD  Date of Initial Visit: Type: THERAPY  Noted: 2020  Today's Date: 2020    VISIT#: 5    Subjective Patient reports noticing some improvements with decreased radicular symptoms and HA. Pleased with progress.       Objective Added rows lvl 6     See Exercise, Manual, and Modality Logs for complete treatment.     Assessment/Plan Patient progressing well with centralized symptoms described and progressing well towards goals. Patient tolerated progressed therapeutic exercise well with no reports of increased symptoms.     Progress per Plan of Care         Timed:         Manual Therapy:    15     mins  21984;     Therapeutic Exercise:    5     mins  09121;     Neuromuscular Silvana:        mins  30336;    Therapeutic Activity:          mins  70421;     Gait Training:           mins  95658;     Ultrasound:          mins  34036;    Ionto                                   mins   40632  Canalith Repos                   mins  31879    Un-Timed:  Electrical Stimulation:    15     mins  49591 (MC );  Dry Needling          mins self-pay  Traction     15     mins 56590    Timed Treatment:   20   mins   Total Treatment:     50   mins    Vivek Cee PTA  Physical Therapist

## 2020-12-11 ENCOUNTER — TREATMENT (OUTPATIENT)
Dept: PHYSICAL THERAPY | Facility: CLINIC | Age: 65
End: 2020-12-11

## 2020-12-11 DIAGNOSIS — M50.90 DISC DISORDER OF CERVICAL REGION: Primary | ICD-10-CM

## 2020-12-11 PROCEDURE — 97140 MANUAL THERAPY 1/> REGIONS: CPT | Performed by: PHYSICAL THERAPIST

## 2020-12-11 PROCEDURE — 97012 MECHANICAL TRACTION THERAPY: CPT | Performed by: PHYSICAL THERAPIST

## 2020-12-11 PROCEDURE — G0283 ELEC STIM OTHER THAN WOUND: HCPCS | Performed by: PHYSICAL THERAPIST

## 2020-12-11 NOTE — PROGRESS NOTES
Physical Therapy Daily Progress Note      Patient: Genevieve Kuo   : 1955  Diagnosis/ICD-10 Code:  Disc disorder of cervical region [M50.90]   Problems Addressed this Visit     None      Visit Diagnoses     Disc disorder of cervical region    -  Primary      Diagnoses       Codes Comments    Disc disorder of cervical region    -  Primary ICD-10-CM: M50.90  ICD-9-CM: 722.91          Referring practitioner: Sade Fishman MD  Date of Initial Visit: Type: THERAPY  Noted: 2020  Today's Date: 2020    VISIT#: 6    Subjective Patient reports having mild R UE discomfort but overall much improved since beginning PT.       Objective Added lat pulls     See Exercise, Manual, and Modality Logs for complete treatment.     Assessment/Plan Patient tolerated progressed therapeutic exercise well with no reports of increased symptoms. Patient continues to progress well towards goals at this time.     Progress per Plan of Care         Timed:         Manual Therapy:    15     mins  68089;     Therapeutic Exercise:    5     mins  76752;     Neuromuscular Silvana:        mins  23194;    Therapeutic Activity:          mins  94022;     Gait Training:           mins  39376;     Ultrasound:          mins  52249;    Ionto                                   mins   28230  Canalith Repos                   mins  23890    Un-Timed:  Electrical Stimulation:    15     mins  43054 (MC );  Dry Needling          mins self-pay  Traction     15     mins 75240    Timed Treatment:   20   mins   Total Treatment:     50   mins    Vivek Cee PTA  Physical Therapist

## 2020-12-15 ENCOUNTER — TREATMENT (OUTPATIENT)
Dept: PHYSICAL THERAPY | Facility: CLINIC | Age: 65
End: 2020-12-15

## 2020-12-15 DIAGNOSIS — M50.90 DISC DISORDER OF CERVICAL REGION: Primary | ICD-10-CM

## 2020-12-15 PROCEDURE — 97012 MECHANICAL TRACTION THERAPY: CPT | Performed by: PHYSICAL THERAPIST

## 2020-12-15 PROCEDURE — G0283 ELEC STIM OTHER THAN WOUND: HCPCS | Performed by: PHYSICAL THERAPIST

## 2020-12-15 PROCEDURE — 97140 MANUAL THERAPY 1/> REGIONS: CPT | Performed by: PHYSICAL THERAPIST

## 2020-12-15 NOTE — PROGRESS NOTES
Physical Therapy Daily Progress Note      Patient: Genevieve Kuo   : 1955  Diagnosis/ICD-10 Code:  Disc disorder of cervical region [M50.90]   Problems Addressed this Visit     None      Visit Diagnoses     Disc disorder of cervical region    -  Primary      Diagnoses       Codes Comments    Disc disorder of cervical region    -  Primary ICD-10-CM: M50.90  ICD-9-CM: 722.91          Referring practitioner: Sade Fishman MD  Date of Initial Visit: Type: THERAPY  Noted: 2020  Today's Date: 12/15/2020    VISIT#: 7    Subjective Pt stated she is doing alright.  Did have some tightness at upper trap last night and this am.  Traction has been feeling good.      Objective Began with heat/estim.  Completed manual stretching/STM then gentle exercise.e  Finished with traction.      See Exercise, Manual, and Modality Logs for complete treatment.     Assessment/Plan Pt tolerated tx well with no inc pain.      Progress per Plan of Care         Timed:         Manual Therapy:    15     mins  84946;     Therapeutic Exercise:    5     mins  69212;     Neuromuscular Silvana:        mins  10213;    Therapeutic Activity:          mins  23553;     Gait Training:           mins  58661;     Ultrasound:          mins  95099;    Ionto                                   mins   30162  Self Care                            mins   43984  Canalith Repos                   mins  63121    Un-Timed:  Electrical Stimulation:    15     mins  85865 ( );  Dry Needling          mins self-pay  Traction     15     mins 63005  Low Eval          Mins  04905  Mod Eval          Mins  60060  High Eval                            Mins  58731  Re-Eval                               mins  84565    Timed Treatment:   20   mins   Total Treatment:     50   mins    Alma Pastor PT  Physical Therapist

## 2020-12-17 ENCOUNTER — TREATMENT (OUTPATIENT)
Dept: PHYSICAL THERAPY | Facility: CLINIC | Age: 65
End: 2020-12-17

## 2020-12-17 DIAGNOSIS — M50.90 DISC DISORDER OF CERVICAL REGION: Primary | ICD-10-CM

## 2020-12-17 PROCEDURE — 97110 THERAPEUTIC EXERCISES: CPT | Performed by: PHYSICAL THERAPIST

## 2020-12-17 PROCEDURE — G0283 ELEC STIM OTHER THAN WOUND: HCPCS | Performed by: PHYSICAL THERAPIST

## 2020-12-17 PROCEDURE — 97012 MECHANICAL TRACTION THERAPY: CPT | Performed by: PHYSICAL THERAPIST

## 2020-12-17 PROCEDURE — 97140 MANUAL THERAPY 1/> REGIONS: CPT | Performed by: PHYSICAL THERAPIST

## 2020-12-17 NOTE — PROGRESS NOTES
Physical Therapy Daily Progress Note      Patient: Genevieve Kuo   : 1955  Diagnosis/ICD-10 Code:  Disc disorder of cervical region [M50.90]   Problems Addressed this Visit     None      Visit Diagnoses     Disc disorder of cervical region    -  Primary      Diagnoses       Codes Comments    Disc disorder of cervical region    -  Primary ICD-10-CM: M50.90  ICD-9-CM: 722.91          Referring practitioner: Sade Fishman MD  Date of Initial Visit: Type: THERAPY  Noted: 2020  Today's Date: 2020    VISIT#: 8    Subjective Patient reports having a return of some mild radicular symptoms in right UE down to elbow, and feeling some soreness in between shoulder blade.       Objective added seated thoracic extension stretch      See Exercise, Manual, and Modality Logs for complete treatment.     Assessment/Plan Patient responded well to added active stretch with proper return demonstration provided. Patient will continue to benefit from improved postural awareness and base scapular strength.     Progress per Plan of Care         Timed:         Manual Therapy:    15     mins  42725;     Therapeutic Exercise:    10     mins  35127;     Neuromuscular Silvana:        mins  82707;    Therapeutic Activity:         mins  30827;     Gait Training:           mins  26821;     Ultrasound:          mins  39891;    Ionto                                   mins   91165  Canalith Repos                   mins  54336    Un-Timed:  Electrical Stimulation:    15     mins  87940 ( );  Dry Needling          mins self-pay  Traction     15     mins 87850    Timed Treatment:   25   mins   Total Treatment:     55   mins    Vivek Cee PTA  Physical Therapist

## 2020-12-22 ENCOUNTER — TREATMENT (OUTPATIENT)
Dept: PHYSICAL THERAPY | Facility: CLINIC | Age: 65
End: 2020-12-22

## 2020-12-22 DIAGNOSIS — M50.90 DISC DISORDER OF CERVICAL REGION: Primary | ICD-10-CM

## 2020-12-22 PROCEDURE — G0283 ELEC STIM OTHER THAN WOUND: HCPCS | Performed by: PHYSICAL THERAPIST

## 2020-12-22 PROCEDURE — 97140 MANUAL THERAPY 1/> REGIONS: CPT | Performed by: PHYSICAL THERAPIST

## 2020-12-22 PROCEDURE — 97012 MECHANICAL TRACTION THERAPY: CPT | Performed by: PHYSICAL THERAPIST

## 2020-12-22 NOTE — PROGRESS NOTES
Physical Therapy Daily Progress Note      Patient: Genevieve Kuo   : 1955  Diagnosis/ICD-10 Code:  Disc disorder of cervical region [M50.90]   Problems Addressed this Visit     None      Visit Diagnoses     Disc disorder of cervical region    -  Primary      Diagnoses       Codes Comments    Disc disorder of cervical region    -  Primary ICD-10-CM: M50.90  ICD-9-CM: 722.91          Referring practitioner: Sade Fishman MD  Date of Initial Visit: Type: THERAPY  Noted: 2020  Today's Date: 2020    VISIT#: 9    Subjective Pt stated she has been busy making a lot of cookies.  R upper arm is really hurting.  Dec pain after traction today.      Objective Began with estim then manual stretching.  Finished with traction.      See Exercise, Manual, and Modality Logs for complete treatment.     Assessment/Plan Dec pain RUE after tx.      Progress per Plan of Care         Timed:         Manual Therapy:    20     mins  61719;     Therapeutic Exercise:    5     mins  23203;     Neuromuscular Silvana:        mins  56868;    Therapeutic Activity:          mins  04432;     Gait Training:           mins  23771;     Ultrasound:          mins  35370;    Ionto                                   mins   98021  Self Care                            mins   67871  Canalith Repos                   mins  75879    Un-Timed:  Electrical Stimulation:    15     mins  10211 ( );  Dry Needling          mins self-pay  Traction     15     mins 28351  Low Eval          Mins  41165  Mod Eval          Mins  91569  High Eval                            Mins  96430  Re-Eval                               mins  56639    Timed Treatment:   25   mins   Total Treatment:     55   mins    Alma Pastor PT  Physical Therapist

## 2020-12-30 ENCOUNTER — TREATMENT (OUTPATIENT)
Dept: PHYSICAL THERAPY | Facility: CLINIC | Age: 65
End: 2020-12-30

## 2020-12-30 DIAGNOSIS — M50.90 DISC DISORDER OF CERVICAL REGION: Primary | ICD-10-CM

## 2020-12-30 PROCEDURE — 97110 THERAPEUTIC EXERCISES: CPT | Performed by: PHYSICAL THERAPIST

## 2020-12-30 PROCEDURE — 97012 MECHANICAL TRACTION THERAPY: CPT | Performed by: PHYSICAL THERAPIST

## 2020-12-30 PROCEDURE — G0283 ELEC STIM OTHER THAN WOUND: HCPCS | Performed by: PHYSICAL THERAPIST

## 2020-12-30 PROCEDURE — 97140 MANUAL THERAPY 1/> REGIONS: CPT | Performed by: PHYSICAL THERAPIST

## 2020-12-30 NOTE — PROGRESS NOTES
Physical Therapy Daily Progress Note/ 10th Visit Note      Patient: Genevieve Kuo   : 1955  Diagnosis/ICD-10 Code:  Disc disorder of cervical region [M50.90]   Problems Addressed this Visit     None      Visit Diagnoses     Disc disorder of cervical region    -  Primary      Diagnoses       Codes Comments    Disc disorder of cervical region    -  Primary ICD-10-CM: M50.90  ICD-9-CM: 722.91          Referring practitioner: Sade Fishman MD  Date of Initial Visit: Type: THERAPY  Noted: 2020  Today's Date: 2020    VISIT#: 10    Subjective Questionnaire: NDI:15/50 --> 30%    Subjective Pt stated that she is doing alright.  Most soreness at ant shoulder today.      Objective Began with estim, completed manual stretching, completed gentle exercise.  Finished with traction.      See Exercise, Manual, and Modality Logs for complete treatment.     Assessment/Plan Dec pain after tx.      Goals  Plan Goals: STG  Pt I with HEP in 2 weeks.  MET  To dem cervical AROM WFL with no inc pain in 2-3 weeks.  PARTIALLY MET  To report no radicular symptoms in 3-4 weeks. NOT MET  LTG  To tolerate sleep with no inc pain/radicular symptoms in 4-6 weeks. PARTIALLY MET  To dec pain in neck/UE 0-1/10 max in 4-6 weeks. 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  NOT MET    Progress per Plan of Care         Timed:         Manual Therapy:    15     mins  31125;     Therapeutic Exercise:    10     mins  67878;     Neuromuscular Silvana:        mins  59632;    Therapeutic Activity:          mins  20250;     Gait Training:           mins  58704;     Ultrasound:          mins  94756;    Ionto                                   mins   97315  Self Care                            mins   23699  Canalith Repos                   mins  35301    Un-Timed:  Electrical Stimulation:    15     mins  78207 ( );  Dry Needling          mins self-pay  Traction     15     mins 18006  Low Eval          Mins   42992  Mod Eval          Mins  87329  High Eval                            Mins  74279  Re-Eval                               mins  36138    Timed Treatment:   25   mins   Total Treatment:     55   mins    Alma Pastor PT  Physical Therapist

## 2021-01-04 ENCOUNTER — TREATMENT (OUTPATIENT)
Dept: PHYSICAL THERAPY | Facility: CLINIC | Age: 66
End: 2021-01-04

## 2021-01-04 DIAGNOSIS — M50.90 DISC DISORDER OF CERVICAL REGION: Primary | ICD-10-CM

## 2021-01-04 PROCEDURE — 97110 THERAPEUTIC EXERCISES: CPT | Performed by: PHYSICAL THERAPIST

## 2021-01-04 PROCEDURE — G0283 ELEC STIM OTHER THAN WOUND: HCPCS | Performed by: PHYSICAL THERAPIST

## 2021-01-04 PROCEDURE — 97140 MANUAL THERAPY 1/> REGIONS: CPT | Performed by: PHYSICAL THERAPIST

## 2021-01-04 PROCEDURE — 97012 MECHANICAL TRACTION THERAPY: CPT | Performed by: PHYSICAL THERAPIST

## 2021-01-04 NOTE — PROGRESS NOTES
Physical Therapy Daily Progress Note      Patient: Genevieve Kuo   : 1955  Diagnosis/ICD-10 Code:  Disc disorder of cervical region [M50.90]   Problems Addressed this Visit     None      Visit Diagnoses     Disc disorder of cervical region    -  Primary      Diagnoses       Codes Comments    Disc disorder of cervical region    -  Primary ICD-10-CM: M50.90  ICD-9-CM: 722.91          Referring practitioner: Sade Fishman MD  Date of Initial Visit: Type: THERAPY  Noted: 2020  Today's Date: 2021    VISIT#: 11    Subjective Patient reports feeling a little better, does still have some soreness in anterior shoulder.       Objective Began with estim, completed manual stretching, completed gentle exercise.  Finished with traction.    See Exercise, Manual, and Modality Logs for complete treatment.     Assessment/Plan Patient continues to make gradual progress towards goals.   Plan Goals: STG  Pt I with HEP in 2 weeks.  MET  To dem cervical AROM WFL with no inc pain in 2-3 weeks.  PARTIALLY MET  To report no radicular symptoms in 3-4 weeks. NOT MET  LTG  To tolerate sleep with no inc pain/radicular symptoms in 4-6 weeks. PARTIALLY MET  To dec pain in neck/UE 0-1/10 max in 4-6 weeks. 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  NOT MET  Progress per Plan of Care         Timed:         Manual Therapy:    15     mins  43217;     Therapeutic Exercise:    10     mins  61792;     Neuromuscular Silvana:        mins  56754;    Therapeutic Activity:          mins  93132;     Gait Training:           mins  21859;     Ultrasound:          mins  08273;    Ionto                                   mins   28880  Canalith Repos                   mins  39348    Un-Timed:  Electrical Stimulation:    15     mins  01748 ( );  Dry Needling          mins self-pay  Traction     15     mins 59316    Timed Treatment:   25   mins   Total Treatment:     55   mins    Vivek Cee PTA  Physical  Therapist

## 2021-01-07 ENCOUNTER — TREATMENT (OUTPATIENT)
Dept: PHYSICAL THERAPY | Facility: CLINIC | Age: 66
End: 2021-01-07

## 2021-01-07 DIAGNOSIS — M50.90 DISC DISORDER OF CERVICAL REGION: Primary | ICD-10-CM

## 2021-01-07 PROCEDURE — 97110 THERAPEUTIC EXERCISES: CPT | Performed by: PHYSICAL THERAPIST

## 2021-01-07 PROCEDURE — 97012 MECHANICAL TRACTION THERAPY: CPT | Performed by: PHYSICAL THERAPIST

## 2021-01-07 PROCEDURE — G0283 ELEC STIM OTHER THAN WOUND: HCPCS | Performed by: PHYSICAL THERAPIST

## 2021-01-07 PROCEDURE — 97140 MANUAL THERAPY 1/> REGIONS: CPT | Performed by: PHYSICAL THERAPIST

## 2021-01-07 NOTE — PROGRESS NOTES
Physical Therapy Daily Progress Note      Patient: Genevieve Kuo   : 1955  Diagnosis/ICD-10 Code:  Disc disorder of cervical region [M50.90]   Problems Addressed this Visit     None      Visit Diagnoses     Disc disorder of cervical region    -  Primary      Diagnoses       Codes Comments    Disc disorder of cervical region    -  Primary ICD-10-CM: M50.90  ICD-9-CM: 722.91          Referring practitioner: Sade Fishman MD  Date of Initial Visit: Type: THERAPY  Noted: 2020  Today's Date: 2021    VISIT#: 12    Subjective Patient no longer having tingling in arm, but having some aching in posterior/lateral shoulder.       Objective Added kinesiotape for improved postural awareness.     See Exercise, Manual, and Modality Logs for complete treatment.     Assessment/Plan Patient with continued rounded anterior shoulder, patient responded well to added tape, with decreased symptoms post treatment. Patient will continue to benefit from improved postural awareness.   Plan Goals: STG  Pt I with HEP in 2 weeks.  MET  To dem cervical AROM WFL with no inc pain in 2-3 weeks.  PARTIALLY MET  To report no radicular symptoms in 3-4 weeks. NOT MET  LTG  To tolerate sleep with no inc pain/radicular symptoms in 4-6 weeks. PARTIALLY MET  To dec pain in neck/UE 0-1/10 max in 4-6 weeks. 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  NOT MET  Progress per Plan of Care         Timed:         Manual Therapy:    15     mins  72920;     Therapeutic Exercise:    15     mins  09986;     Neuromuscular Silvana:        mins  33934;    Therapeutic Activity:          mins  44751;     Gait Training:           mins  27030;     Ultrasound:          mins  46423;    Ionto                                   mins   57026  Canalith Repos                   mins  84136    Un-Timed:  Electrical Stimulation:    15     mins  75415 ( );  Dry Needling          mins self-pay  Traction     15     mins  20184    Timed Treatment:   30   mins   Total Treatment:     60   mins    Vivek Cee, PTA  Physical Therapist

## 2021-01-11 ENCOUNTER — TREATMENT (OUTPATIENT)
Dept: PHYSICAL THERAPY | Facility: CLINIC | Age: 66
End: 2021-01-11

## 2021-01-11 DIAGNOSIS — M50.90 DISC DISORDER OF CERVICAL REGION: Primary | ICD-10-CM

## 2021-01-11 PROCEDURE — 97012 MECHANICAL TRACTION THERAPY: CPT | Performed by: PHYSICAL THERAPIST

## 2021-01-11 PROCEDURE — G0283 ELEC STIM OTHER THAN WOUND: HCPCS | Performed by: PHYSICAL THERAPIST

## 2021-01-11 PROCEDURE — 97140 MANUAL THERAPY 1/> REGIONS: CPT | Performed by: PHYSICAL THERAPIST

## 2021-01-11 NOTE — PROGRESS NOTES
Physical Therapy Daily Progress Note      Patient: Genevieve Kuo   : 1955  Diagnosis/ICD-10 Code:  Disc disorder of cervical region [M50.90]   Problems Addressed this Visit     None      Visit Diagnoses     Disc disorder of cervical region    -  Primary      Diagnoses       Codes Comments    Disc disorder of cervical region    -  Primary ICD-10-CM: M50.90  ICD-9-CM: 722.91          Referring practitioner: Sade Fishman MD  Date of Initial Visit: Type: THERAPY  Noted: 2020  Today's Date: 2021    VISIT#: 13    Subjective Patient reports still having some aching in tricep area, but overall better following last visit. Wants to hold on kinesiotape feeling it didn't help much but stretching seemed to be more beneficial.       Objective held kinesiotape,    See Exercise, Manual, and Modality Logs for complete treatment.     Assessment/Plan  Patient making gradual gains towards goals with improved active cervical motion, continued mild radicular symptoms reported.   Plan Goals: STG  Pt I with HEP in 2 weeks.  MET  To dem cervical AROM WFL with no inc pain in 2-3 weeks.   MET  To report no radicular symptoms in 3-4 weeks. NOT MET  LTG  To tolerate sleep with no inc pain/radicular symptoms in 4-6 weeks. PARTIALLY MET  To dec pain in neck/UE 0-1/10 max in 4-6 weeks. 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  Progressing  Progress per Plan of Care         Timed:         Manual Therapy:    15     mins  58206;     Therapeutic Exercise:    5     mins  20139;     Neuromuscular Silvana:        mins  20615;    Therapeutic Activity:          mins  41028;     Gait Training:           mins  95692;     Ultrasound:          mins  51937;    Ionto                                   mins   94041  Canalith Repos                   mins  12212    Un-Timed:  Electrical Stimulation:    15     mins  90769 ( );  Dry Needling          mins self-pay  Traction     15     mins 08635    Timed  Treatment:   20   mins   Total Treatment:     50   mins    Vivek Cee PTA  Physical Therapist

## 2021-01-14 ENCOUNTER — TREATMENT (OUTPATIENT)
Dept: PHYSICAL THERAPY | Facility: CLINIC | Age: 66
End: 2021-01-14

## 2021-01-14 DIAGNOSIS — M50.90 DISC DISORDER OF CERVICAL REGION: Primary | ICD-10-CM

## 2021-01-14 PROCEDURE — 97012 MECHANICAL TRACTION THERAPY: CPT | Performed by: PHYSICAL THERAPIST

## 2021-01-14 PROCEDURE — G0283 ELEC STIM OTHER THAN WOUND: HCPCS | Performed by: PHYSICAL THERAPIST

## 2021-01-14 PROCEDURE — 97140 MANUAL THERAPY 1/> REGIONS: CPT | Performed by: PHYSICAL THERAPIST

## 2021-01-14 PROCEDURE — 97110 THERAPEUTIC EXERCISES: CPT | Performed by: PHYSICAL THERAPIST

## 2021-01-14 NOTE — PROGRESS NOTES
Physical Therapy Daily Progress Note      Patient: Genevieve Kuo   : 1955  Diagnosis/ICD-10 Code:  Disc disorder of cervical region [M50.90]   Problems Addressed this Visit     None      Visit Diagnoses     Disc disorder of cervical region    -  Primary      Diagnoses       Codes Comments    Disc disorder of cervical region    -  Primary ICD-10-CM: M50.90  ICD-9-CM: 722.91          Referring practitioner: Sade Fishman MD  Date of Initial Visit: Type: THERAPY  Noted: 2020  Today's Date: 2021    VISIT#: 14    Subjective Pt stated that she is doing better.  Was able to pull wagon with her RUE.  Still sore but not as bad.      Objective Began with heat/estim.  Completed manual stretching and gentle exercise.  Finished with traction.      See Exercise, Manual, and Modality Logs for complete treatment.     Assessment/Plan Pt tolerated tx well with no inc pain.      Progress strengthening /stabilization /functional activity         Timed:         Manual Therapy:    15     mins  78327;     Therapeutic Exercise:    8     mins  34132;     Neuromuscular Silvana:        mins  28159;    Therapeutic Activity:          mins  21493;     Gait Training:           mins  07100;     Ultrasound:          mins  41830;    Ionto                                   mins   05413  Self Care                            mins   06754  Canalith Repos                   mins  22688    Un-Timed:  Electrical Stimulation:    15     mins  36191 ( );  Dry Needling          mins self-pay  Traction     15     mins 02258  Low Eval          Mins  83567  Mod Eval          Mins  18543  High Eval                            Mins  65971  Re-Eval                               mins  37862    Timed Treatment:  23    mins   Total Treatment:     53   mins    Alma Pastor PT  Physical Therapist

## 2021-01-19 ENCOUNTER — TREATMENT (OUTPATIENT)
Dept: PHYSICAL THERAPY | Facility: CLINIC | Age: 66
End: 2021-01-19

## 2021-01-19 DIAGNOSIS — M50.90 DISC DISORDER OF CERVICAL REGION: Primary | ICD-10-CM

## 2021-01-19 PROCEDURE — 97140 MANUAL THERAPY 1/> REGIONS: CPT | Performed by: PHYSICAL THERAPIST

## 2021-01-19 PROCEDURE — 97012 MECHANICAL TRACTION THERAPY: CPT | Performed by: PHYSICAL THERAPIST

## 2021-01-19 PROCEDURE — G0283 ELEC STIM OTHER THAN WOUND: HCPCS | Performed by: PHYSICAL THERAPIST

## 2021-01-19 PROCEDURE — 97110 THERAPEUTIC EXERCISES: CPT | Performed by: PHYSICAL THERAPIST

## 2021-01-19 NOTE — PROGRESS NOTES
Physical Therapy Daily Progress Note      Patient: Genevieve Kuo   : 1955  Diagnosis/ICD-10 Code:  Disc disorder of cervical region [M50.90]   Problems Addressed this Visit     None      Visit Diagnoses     Disc disorder of cervical region    -  Primary      Diagnoses       Codes Comments    Disc disorder of cervical region    -  Primary ICD-10-CM: M50.90  ICD-9-CM: 722.91          Referring practitioner: Sade Fishman MD  Date of Initial Visit: Type: THERAPY  Noted: 2020  Today's Date: 2021    VISIT#: 15    Subjective Patient reports feeling a little better but does continue to get soreness in posterior shoulder from time to time.       Objective     See Exercise, Manual, and Modality Logs for complete treatment.     Assessment/Plan Patient tolerate treatment well with reports of decreased symptoms. Patient will continue to benefit from improved stability and postural awareness.     Progress strengthening /stabilization /functional activity         Timed:         Manual Therapy:    15     mins  82362;     Therapeutic Exercise:    10     mins  75657;     Neuromuscular Silvana:        mins  23532;    Therapeutic Activity:          mins  74157;     Gait Training:           mins  95679;     Ultrasound:          mins  28861;    Ionto                                   mins   77289  Canalith Repos                   mins  81124    Un-Timed:  Electrical Stimulation:    15     mins  45939 (MC );  Dry Needling          mins self-pay  Traction     15     mins 11350    Timed Treatment:   25   mins   Total Treatment:     55   mins    Vivek Cee PTA  Physical Therapist

## 2021-01-21 ENCOUNTER — TREATMENT (OUTPATIENT)
Dept: PHYSICAL THERAPY | Facility: CLINIC | Age: 66
End: 2021-01-21

## 2021-01-21 DIAGNOSIS — M50.90 DISC DISORDER OF CERVICAL REGION: Primary | ICD-10-CM

## 2021-01-21 PROCEDURE — G0283 ELEC STIM OTHER THAN WOUND: HCPCS | Performed by: PHYSICAL THERAPIST

## 2021-01-21 PROCEDURE — 97140 MANUAL THERAPY 1/> REGIONS: CPT | Performed by: PHYSICAL THERAPIST

## 2021-01-21 PROCEDURE — 97012 MECHANICAL TRACTION THERAPY: CPT | Performed by: PHYSICAL THERAPIST

## 2021-01-21 NOTE — PROGRESS NOTES
Physical Therapy Daily Progress Note      Patient: Genevieve Kuo   : 1955  Diagnosis/ICD-10 Code:  Disc disorder of cervical region [M50.90]   Problems Addressed this Visit     None      Visit Diagnoses     Disc disorder of cervical region    -  Primary      Diagnoses       Codes Comments    Disc disorder of cervical region    -  Primary ICD-10-CM: M50.90  ICD-9-CM: 722.91          Referring practitioner: Sade Fishman MD  Date of Initial Visit: Type: THERAPY  Noted: 2020  Today's Date: 2021    VISIT#: 16    Subjective Patient reports having some increased aching in R posterior shoulder/tricep area today.       Objective began with MHP/estim, followed by manual stretching, finishing with traction.     See Exercise, Manual, and Modality Logs for complete treatment.     Assessment/Plan Patient responded well to manual interventions and traction with reports of decreased symptoms. Continues to require mild verbal cues for postural awareness.     Progress per Plan of Care         Timed:         Manual Therapy:    15     mins  36222;     Therapeutic Exercise:    5     mins  08409;     Neuromuscular Silvana:        mins  52681;    Therapeutic Activity:          mins  94584;     Gait Training:           mins  44512;     Ultrasound:          mins  69299;    Ionto                                   mins   84797  Canalith Repos                   mins  20948    Un-Timed:  Electrical Stimulation:    15     mins  27955 ( );  Dry Needling          mins self-pay  Traction     15     mins 15569    Timed Treatment:   20   mins   Total Treatment:     50   mins    Vivek Cee PTA  Physical Therapist

## 2021-01-28 ENCOUNTER — TREATMENT (OUTPATIENT)
Dept: PHYSICAL THERAPY | Facility: CLINIC | Age: 66
End: 2021-01-28

## 2021-01-28 DIAGNOSIS — M50.90 DISC DISORDER OF CERVICAL REGION: Primary | ICD-10-CM

## 2021-01-28 PROCEDURE — 97110 THERAPEUTIC EXERCISES: CPT | Performed by: PHYSICAL THERAPIST

## 2021-01-28 PROCEDURE — G0283 ELEC STIM OTHER THAN WOUND: HCPCS | Performed by: PHYSICAL THERAPIST

## 2021-01-28 PROCEDURE — 97140 MANUAL THERAPY 1/> REGIONS: CPT | Performed by: PHYSICAL THERAPIST

## 2021-01-28 NOTE — PROGRESS NOTES
Physical Therapy Daily Progress Note      Patient: Genevieve Kuo   : 1955  Diagnosis/ICD-10 Code:  Disc disorder of cervical region [M50.90]   Problems Addressed this Visit     None      Visit Diagnoses     Disc disorder of cervical region    -  Primary      Diagnoses       Codes Comments    Disc disorder of cervical region    -  Primary ICD-10-CM: M50.90  ICD-9-CM: 722.91          Referring practitioner: Sade Fishman MD  Date of Initial Visit: Type: THERAPY  Noted: 2020  Today's Date: 2021    VISIT#: 17    Subjective Patient reports having some increased stiffness in R neck and posterior shoulder the past couple days.       Objective held traction today per patient request.     See Exercise, Manual, and Modality Logs for complete treatment.     Assessment/Plan Patient with increased R paraspinal/UT tightness noted today. Patient responded well to manual interventions with decreased symptoms reported. Held traction per patient request to trial effectiveness of traction.     Progress per Plan of Care Assess response to holding traction next visit.          Timed:         Manual Therapy:    20     mins  69631;     Therapeutic Exercise:    8    mins  90003;     Neuromuscular Silvana:        mins  45713;    Therapeutic Activity:          mins  60998;     Gait Training:           mins  20984;     Ultrasound:          mins  38868;    Ionto                                   mins   41923  Canalith Repos                  mins  20784    Un-Timed:  Electrical Stimulation:    15     mins  48262 ( );  Dry Needling          mins self-pay  Traction          mins 43698    Timed Treatment:   28   mins   Total Treatment:     43   mins    Vivek Cee PTA  Physical Therapist

## 2021-02-02 ENCOUNTER — TREATMENT (OUTPATIENT)
Dept: PHYSICAL THERAPY | Facility: CLINIC | Age: 66
End: 2021-02-02

## 2021-02-02 DIAGNOSIS — M50.90 DISC DISORDER OF CERVICAL REGION: Primary | ICD-10-CM

## 2021-02-02 PROCEDURE — 97140 MANUAL THERAPY 1/> REGIONS: CPT | Performed by: PHYSICAL THERAPIST

## 2021-02-02 PROCEDURE — 97012 MECHANICAL TRACTION THERAPY: CPT | Performed by: PHYSICAL THERAPIST

## 2021-02-02 NOTE — PROGRESS NOTES
Physical Therapy Daily Progress Note      Patient: Genevieve Kuo   : 1955  Diagnosis/ICD-10 Code:  Disc disorder of cervical region [M50.90]   Problems Addressed this Visit     None      Visit Diagnoses     Disc disorder of cervical region    -  Primary      Diagnoses       Codes Comments    Disc disorder of cervical region    -  Primary ICD-10-CM: M50.90  ICD-9-CM: 722.91          Referring practitioner: Sade Fishman MD  Date of Initial Visit: Type: THERAPY  Noted: 2020  Today's Date: 2021    VISIT#: 18    Subjective Patient reports feeling noticing decreased symptoms following last session, wants to continue to hold on traction. Patient expresses feeling like possible DC next visit due to decreased symptoms and becoming manageable with HEP.     Objective began with MHP/ premod estim, followed by manual interventions, finishing with therapeutic exercise.     See Exercise, Manual, and Modality Logs for complete treatment.     Assessment/Plan Patient tolerated treatment well and continue to hold traction. Patient progressing well towards goals at this time, with decreased symptoms reported at this time.   Plan Goals: STG  Pt I with HEP in 2 weeks.  MET  To dem cervical AROM WFL with no inc pain in 2-3 weeks.   MET  To report no radicular symptoms in 3-4 weeks. PARTIALLY MET  LTG  To tolerate sleep with no inc pain/radicular symptoms in 4-6 weeks. PARTIALLY MET  To dec pain in neck/UE 0-1/10 max in 4-6 weeks. 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  MET  Anticipate DC next Visit         Timed:         Manual Therapy:    20     mins  59226;     Therapeutic Exercise:    5     mins  04119;     Neuromuscular Silvana:        mins  55739;    Therapeutic Activity:          mins  72370;     Gait Training:           mins  02233;     Ultrasound:          mins  35524;    Ionto                                   mins   31532  Canalith Repos                   mins   37150    Un-Timed:  Electrical Stimulation:    15     mins  35245 ( );  Dry Needling          mins self-pay  Traction          mins 57496    Timed Treatment:   25   mins   Total Treatment:     40   mins    Vivek Cee PTA  Physical Therapist

## 2021-02-04 ENCOUNTER — TREATMENT (OUTPATIENT)
Dept: PHYSICAL THERAPY | Facility: CLINIC | Age: 66
End: 2021-02-04

## 2021-02-04 DIAGNOSIS — M50.90 DISC DISORDER OF CERVICAL REGION: Primary | ICD-10-CM

## 2021-02-04 PROCEDURE — 97140 MANUAL THERAPY 1/> REGIONS: CPT | Performed by: PHYSICAL THERAPIST

## 2021-02-04 PROCEDURE — G0283 ELEC STIM OTHER THAN WOUND: HCPCS | Performed by: PHYSICAL THERAPIST

## 2021-02-04 NOTE — PROGRESS NOTES
Physical Therapy Daily Progress Note      Patient: Genevieve Kuo   : 1955  Diagnosis/ICD-10 Code:  Disc disorder of cervical region [M50.90]   Problems Addressed this Visit     None      Visit Diagnoses     Disc disorder of cervical region    -  Primary      Diagnoses       Codes Comments    Disc disorder of cervical region    -  Primary ICD-10-CM: M50.90  ICD-9-CM: 722.91          Referring practitioner: Sade Fishman MD  Date of Initial Visit: Type: THERAPY  Noted: 2020  Today's Date: 2021    VISIT#: 19    Subjective Patient reports still having a little aching in posterior shoulder and feels that progress has hit a plateau. Overall radicular symptoms and increased functional movement of arm has improved and pleased with that progress.       Objective NDI 22% impairment improved from 30% impairment.     See Exercise, Manual, and Modality Logs for complete treatment.     Assessment/Plan Patient has made gradual progress towards goals with decreased pain and improved functional use and mobility.   Plan Goals: STG  Pt I with HEP in 2 weeks.  MET  To dem cervical AROM WFL with no inc pain in 2-3 weeks.   MET  To report no radicular symptoms in 3-4 weeks.  MET  LTG  To tolerate sleep with no inc pain/radicular symptoms in 4-6 weeks. PARTIALLY MET  To dec pain in neck/UE 0-1/10 max in 4-6 weeks. 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  MET    Plan to DC with HEP         Timed:         Manual Therapy:    20     mins  41504;     Therapeutic Exercise:    5     mins  23607;     Neuromuscular Silvana:        mins  19056;    Therapeutic Activity:          mins  53247;     Gait Training:           mins  48893;     Ultrasound:          mins  27333;    Ionto                                   mins   55998  Canalith Repos                   mins  16341    Un-Timed:  Electrical Stimulation:    15     mins  36163 ( );  Dry Needling          mins self-pay  Traction           mins 10276    Timed Treatment:   25   mins   Total Treatment:     40   mins    Vivek Cee, CASSIE  Physical Therapist

## 2021-02-05 ENCOUNTER — TELEPHONE (OUTPATIENT)
Dept: PAIN MEDICINE | Facility: HOSPITAL | Age: 66
End: 2021-02-05

## 2021-02-05 ENCOUNTER — OFFICE VISIT (OUTPATIENT)
Dept: PAIN MEDICINE | Facility: CLINIC | Age: 66
End: 2021-02-05

## 2021-02-05 VITALS
TEMPERATURE: 97.1 F | WEIGHT: 197 LBS | HEIGHT: 67 IN | HEART RATE: 75 BPM | BODY MASS INDEX: 30.92 KG/M2 | RESPIRATION RATE: 16 BRPM | SYSTOLIC BLOOD PRESSURE: 126 MMHG | OXYGEN SATURATION: 95 % | DIASTOLIC BLOOD PRESSURE: 80 MMHG

## 2021-02-05 DIAGNOSIS — M47.812 CERVICAL SPONDYLOSIS WITHOUT MYELOPATHY: ICD-10-CM

## 2021-02-05 DIAGNOSIS — M25.511 ACUTE PAIN OF RIGHT SHOULDER: Primary | ICD-10-CM

## 2021-02-05 DIAGNOSIS — M50.20 DISPLACEMENT OF CERVICAL INTERVERTEBRAL DISC WITHOUT MYELOPATHY: ICD-10-CM

## 2021-02-05 PROCEDURE — G0463 HOSPITAL OUTPT CLINIC VISIT: HCPCS | Performed by: STUDENT IN AN ORGANIZED HEALTH CARE EDUCATION/TRAINING PROGRAM

## 2021-02-05 PROCEDURE — 99214 OFFICE O/P EST MOD 30 MIN: CPT | Performed by: STUDENT IN AN ORGANIZED HEALTH CARE EDUCATION/TRAINING PROGRAM

## 2021-02-05 RX ORDER — TIZANIDINE 4 MG/1
4 TABLET ORAL NIGHTLY PRN
Qty: 30 TABLET | Refills: 0 | Status: SHIPPED | OUTPATIENT
Start: 2021-02-05 | End: 2021-04-27 | Stop reason: SDUPTHER

## 2021-02-05 NOTE — TELEPHONE ENCOUNTER
PATIENT IS NEEDING TO HAVE MRI OF HER RIGHT SHOULDER. PATIENT WOULD LIKE TO HAVE THIS DONE AT [facility] IF POSSIBLE. PLEASE CHECK INSURANCE. THANK YOU

## 2021-02-05 NOTE — PROGRESS NOTES
CHIEF COMPLAINT  Chief Complaint   Patient presents with   • Neck Pain     ONLY TAKES GABAPENTIN   • Shoulder Pain     RIGHT GOING INTO ARM        Primary Care  Sravanthi, Sade POLK MD    Subjective   Genevieve Kuo is a 65 y.o. female  who presents for chronic radicular neck pain as well as right shoulder pain.  She states that she is had longstanding pain in the neck as she used to work in a restaurant waiting tables carry heavy trays.  She states that most recently, she was helping to lift a calf and felt her shoulder pull and since that time has had continued pain.  She is currently working with physical therapy which offers her minimal benefit.  She will occasionally take Tylenol, but is allergic to NSAIDs.  She denies any significant numbness or weakness in the hands describes it primarily as a sharp, stabbing pain into the shoulder as well as aching occasional stabbing pains in the neck.    History of Present Illness     Location: Neck, right shoulder  Onset: The neck many years ago, the right shoulder approximately a year ago  Duration: Progressively worsening  Timing: Constant throughout the day  Quality: Sharp, stabbing pains with occasional burning sensations  Severity: Today: 6       Last Week: 6       Worst: 7  Modifying Factors: The pain is worse with any type of physical activity and moving and lifting.  Pain is slightly improved with rest and acetaminophen and TENS unit    Physical Therapy: yes    Interval Update 02/05/2021:     The following portions of the patient's history were reviewed and updated as appropriate: allergies, current medications, past family history, past medical history, past social history, past surgical history and problem list.      Current Outpatient Medications:   •  albuterol (PROVENTIL) (2.5 MG/3ML) 0.083% nebulizer solution, Inhale As Needed., Disp: , Rfl:   •  atorvastatin (LIPITOR) 10 MG tablet, Take 1 tablet by mouth Daily., Disp: 90 tablet, Rfl: 4  •  escitalopram  "(LEXAPRO) 20 MG tablet, Take 1 tablet by mouth Daily., Disp: 90 tablet, Rfl: 4  •  gabapentin (NEURONTIN) 300 MG capsule, Take 1 capsule by mouth 2 (Two) Times a Day., Disp: 60 capsule, Rfl: 12  •  levothyroxine (SYNTHROID, LEVOTHROID) 88 MCG tablet, Take 1 tablet by mouth Daily., Disp: 90 tablet, Rfl: 4  •  montelukast (SINGULAIR) 10 MG tablet, Take 1 tablet by mouth Daily., Disp: 90 tablet, Rfl: 4  •  Omega-3 Fatty Acids (FISH OIL) 1000 MG capsule capsule, Take 1 capsule by mouth 3 (Three) Times a Day., Disp: , Rfl:   •  Probiotic capsule, Take 1 capsule by mouth Daily., Disp: , Rfl:   •  tiZANidine (ZANAFLEX) 4 MG tablet, Take 1 tablet by mouth At Night As Needed for Muscle Spasms., Disp: 30 tablet, Rfl: 0    Review of Systems   Musculoskeletal: Positive for neck pain and neck stiffness.        Right shoulder pain   Neurological: Negative for weakness and numbness.       Vitals:    02/05/21 0947   BP: 126/80   Pulse: 75   Resp: 16   Temp: 97.1 °F (36.2 °C)   SpO2: 95%   Weight: 89.4 kg (197 lb)   Height: 170.2 cm (67\")   PainSc:   6       Objective   Physical Exam  Vitals signs and nursing note reviewed.   Constitutional:       General: She is not in acute distress.     Appearance: Normal appearance. She is obese.   Neck:      Comments: Cervical spine exam:  1.  Minimally tender bilateral paraspinal musculature  2.  Minimally tender bilateral right greater than left cervical facets  3.  Cervical facet loading weakly positive on the right  4.  Spurling positive bilaterally  Musculoskeletal:      Right shoulder: She exhibits decreased range of motion, tenderness, pain and decreased strength. She exhibits no swelling.   Neurological:      General: No focal deficit present.      Mental Status: She is alert and oriented to person, place, and time.           Assessment/Plan   Problems Addressed this Visit        Other    Acute pain of right shoulder - Primary    Relevant Orders    MRI Shoulder Right Without Contrast    "   Other Visit Diagnoses     Cervical spondylosis without myelopathy        Displacement of cervical intervertebral disc without myelopathy          Diagnoses       Codes Comments    Acute pain of right shoulder    -  Primary ICD-10-CM: M25.511  ICD-9-CM: 719.41     Cervical spondylosis without myelopathy     ICD-10-CM: M47.812  ICD-9-CM: 721.0     Displacement of cervical intervertebral disc without myelopathy     ICD-10-CM: M50.20  ICD-9-CM: 722.0           Plan:  1. I feel that she has a combination of pathology including both shoulder pathology as well as cervical spine pathology  2. As she is allergic to NSAIDs, we are somewhat limited in the medication we can prescribe  3. Her right shoulder plain films did not reveal any significant pathology which leads me believe that she may have a soft tissue injury including rotator cuff injury or tendinitis  4. She does have evidence of disc bulge as well as disc osteophyte complexes in the cervical spine  5. Imaging.  I will schedule next available for a cervical epidural in hopes of helping decrease some of the pressure and burning pain that she experiences in the neck  6. We will also order MRI of the right shoulder for possible steroid intervention versus referral to orthopedic surgery  7. We will also order tizanidine as she states she got good benefit from medication in the past  --- Follow-up next available for cervical epidural           INSPECT REPORT    As part of the patient's treatment plan, I may be prescribing controlled substances. The patient has been made aware of appropriate use of such medications, including potential risk of somnolence, limited ability to drive and/or work safely, and the potential for dependence or overdose. It has also bee made clear that these medications are for use by this patient only, without concomitant use of alcohol or other substances unless prescribed.     Patient has completed prescribing agreement detailing terms of  continued prescribing of controlled substances, including monitoring ALYCE reports, urine drug screening, and pill counts if necessary. The patient is aware that inappropriate use will results in cessation of prescribing such medications.    INSPECT report has been reviewed and scanned into the patient's chart.    As the clinician, I personally reviewed the INSPECT from 2/5/2020.    History and physical exam exhibit continued safe and appropriate use of controlled substances.      EMR Dragon/Transcription disclaimer:   Much of this encounter note is an electronic transcription/translation of spoken language to printed text. The electronic translation of spoken language may permit erroneous, or at times, nonsensical words or phrases to be inadvertently transcribed; Although I have reviewed the note for such errors, some may still exist.

## 2021-02-11 NOTE — PROGRESS NOTES
Discharge Summary  Discharge Summary from Physical Therapy Report      Dates  PT visit: Nov 2020-Feb 2021  Number of Visits: 19     Discharge Status of Patient: At last visit pt reports still having a little aching in posterior shoulder and feels that progress has hit a plateau. Overall radicular symptoms and increased functional movement of arm has improved and pleased with that progress.     Goals: Partially Met    Discharge Plan: Continue with current home exercise program as instructed    Comments Pt prepared for DC at this time.      Date of Discharge 2/4/21        Alma Pastor, PT, DPT  Physical Therapist

## 2021-02-17 ENCOUNTER — TELEPHONE (OUTPATIENT)
Dept: PAIN MEDICINE | Facility: CLINIC | Age: 66
End: 2021-02-17

## 2021-02-17 NOTE — TELEPHONE ENCOUNTER
HUB STAFF ATTEMPTED WARM TRANSFER & WAS UNSUCCESSFUL     Caller: Genevieve Kuo    Relationship to patient: Self    Best call back number: [phone]     Chief complaint: NEEDS TO RESCHEDULE CERVICAL EPIDURAL SINCE MRI HAS BEEN RESCHEDULED DUE TO SNOW / ICE    Type of visit: PROCEDURE CERVICAL EPIDURAL #1~MEDICARE     Requested date: AVAILABLE ANY DAY ANY TIME AFTER TUES 02/23/21 (GETTING CERVICAL MRI AT NOON)      If rescheduling, when is the original appointment: CERVICAL MRI WAS ORIGINALLY SCHEDULED 02/16/21 & CERVICAL EPIDURAL WAS ORIGINALLY 02/19/21 @ 0900 AM     PATIENT CALL BACK [phone]     THANKS

## 2021-03-04 ENCOUNTER — HOSPITAL ENCOUNTER (OUTPATIENT)
Dept: PAIN MEDICINE | Facility: HOSPITAL | Age: 66
Discharge: HOME | End: 2021-03-04

## 2021-03-04 VITALS
OXYGEN SATURATION: 95 % | TEMPERATURE: 97.4 F | RESPIRATION RATE: 16 BRPM | HEIGHT: 67 IN | DIASTOLIC BLOOD PRESSURE: 80 MMHG | HEART RATE: 73 BPM | BODY MASS INDEX: 30.92 KG/M2 | WEIGHT: 197 LBS | SYSTOLIC BLOOD PRESSURE: 128 MMHG

## 2021-03-04 DIAGNOSIS — M50.20 DISPLACEMENT OF CERVICAL INTERVERTEBRAL DISC WITHOUT MYELOPATHY: Primary | ICD-10-CM

## 2021-03-04 DIAGNOSIS — R52 PAIN: ICD-10-CM

## 2021-03-04 PROCEDURE — 77003 FLUOROGUIDE FOR SPINE INJECT: CPT

## 2021-03-04 PROCEDURE — 62321 NJX INTERLAMINAR CRV/THRC: CPT | Performed by: STUDENT IN AN ORGANIZED HEALTH CARE EDUCATION/TRAINING PROGRAM

## 2021-03-04 PROCEDURE — 0 IOPAMIDOL 41 % SOLUTION: Performed by: STUDENT IN AN ORGANIZED HEALTH CARE EDUCATION/TRAINING PROGRAM

## 2021-03-04 PROCEDURE — 25010000002 METHYLPREDNISOLONE PER 40 MG: Performed by: STUDENT IN AN ORGANIZED HEALTH CARE EDUCATION/TRAINING PROGRAM

## 2021-03-04 RX ORDER — BUPIVACAINE HYDROCHLORIDE 5 MG/ML
10 INJECTION, SOLUTION EPIDURAL; INTRACAUDAL ONCE
Status: COMPLETED | OUTPATIENT
Start: 2021-03-04 | End: 2021-03-04

## 2021-03-04 RX ORDER — METHYLPREDNISOLONE ACETATE 40 MG/ML
40 INJECTION, SUSPENSION INTRA-ARTICULAR; INTRALESIONAL; INTRAMUSCULAR; SOFT TISSUE ONCE
Status: COMPLETED | OUTPATIENT
Start: 2021-03-04 | End: 2021-03-04

## 2021-03-04 RX ADMIN — IOPAMIDOL 3 ML: 408 INJECTION, SOLUTION INTRATHECAL at 14:30

## 2021-03-04 RX ADMIN — BUPIVACAINE HYDROCHLORIDE 10 ML: 5 INJECTION, SOLUTION EPIDURAL; INTRACAUDAL; PERINEURAL at 14:30

## 2021-03-04 RX ADMIN — METHYLPREDNISOLONE ACETATE 40 MG: 40 INJECTION, SUSPENSION INTRA-ARTICULAR; INTRALESIONAL; INTRAMUSCULAR; SOFT TISSUE at 14:31

## 2021-03-04 NOTE — PROCEDURES
Cervical Epidural Steroid Injection @ C7-T1  [facility]    PREOPERATIVE DIAGNOSIS:  Cervical Radiculopathy, Cervical Spinal Stenosis and Cervical Disc Displacement  POSTOPERATIVE DIAGNOSIS:  Same as preop diagnosis    PROCEDURE:   Cervical Epidural Steroid Injection, Therapeutic Translaminar Injection, with epidurogram, at  C7-T1 level    PRE-PROCEDURE DISCUSSION WITH PATIENT:    Risks and complications were discussed with the patient prior to starting the procedure and informed consent was obtained.  We discussed various topics including but not limited to bleeding, infection, injury, paralysis, nerve injury, dural puncture, coma, death, worsening of clinical picture, lack of pain relief, and postprocedural soreness.    SURGEON:  Gene Bolivar MD    REASON FOR PROCEDURE:   Stenotic area is noted, and is likely contributing to this chronic &/or recurrent pain. , Radiating pattern of pain is likely consistent with degenerative changes in the area. and Radicular pain pattern seems consistent with this dermatome.    SEDATION:  Patient declined administration of moderate sedation    ANESTHETIC:  injectable LAs: Marcaine 0.25%  STEROID:   Methylprednisolone (DEPO MEDROL) 40mg/ml    DESCRIPTON OF PROCEDURE:  After obtaining informed consent, I.V. was started in the preop area.   The patient was taken to the operating room and placed in the prone position.  All pressure points were well padded.  The cervicothoracic spine area was prepped with Chloraprep and draped in a sterile fashion.  Under fluoroscopic guidance, the aforementioned interlaminar space was identified. Skin and subcutaneous tissues were anesthetized with 1% lidocaine in the middle of the space. A Tuohy needle was introduced through the skin and advanced to this interlaminar space and into the epidural space under fluoroscopic guidance and verified with loss-of-resistance technique to air.  After confirming the position of the needle with the  fluoroscope with all the views, and after aspiration was confirmed negative for blood and CSF, 1.5 mL of Omnipaque was injected.  After seeing appropriate epidurogram with lateral and PA views, a total of 3 cc solution was injected, consisting of 3cc of local anesthetic as above, with normal saline and injectable steroid as above.     ESTIMATED BLOOD LOSS:  <5 mL  SPECIMENS:  None    COMPLICATIONS:     No complications were noted., There was no indication of vascular uptake on live injection of contrast dye., There was no indication of intrathecal uptake on live injection of contrast dye. and There was not any evidence of dural puncture.      TOLERANCE & DISCHARGE CONDITION:    The patient tolerated the procedure well.  The patient was transported to the recovery area without difficulties.  The patient was discharged to home under the care of family in stable and satisfactory condition.    PLAN OF CARE:  1. The patient was given our standard instruction sheet.        2.   The patient will Return to clinic 2-3 wks.  3.    The patient will resume all medications as per the medication reconciliation sheet.

## 2021-03-18 ENCOUNTER — OFFICE VISIT (OUTPATIENT)
Dept: PAIN MEDICINE | Facility: CLINIC | Age: 66
End: 2021-03-18

## 2021-03-18 VITALS
HEART RATE: 83 BPM | HEIGHT: 67 IN | OXYGEN SATURATION: 94 % | WEIGHT: 197 LBS | RESPIRATION RATE: 16 BRPM | DIASTOLIC BLOOD PRESSURE: 69 MMHG | SYSTOLIC BLOOD PRESSURE: 120 MMHG | BODY MASS INDEX: 30.92 KG/M2

## 2021-03-18 DIAGNOSIS — Z79.899 ENCOUNTER FOR LONG-TERM (CURRENT) USE OF OTHER MEDICATIONS: Primary | ICD-10-CM

## 2021-03-18 DIAGNOSIS — M46.1 BILATERAL SACROILIITIS (HCC): ICD-10-CM

## 2021-03-18 DIAGNOSIS — M25.511 ACUTE PAIN OF RIGHT SHOULDER: Primary | ICD-10-CM

## 2021-03-18 PROCEDURE — 99214 OFFICE O/P EST MOD 30 MIN: CPT | Performed by: STUDENT IN AN ORGANIZED HEALTH CARE EDUCATION/TRAINING PROGRAM

## 2021-03-18 PROCEDURE — G0463 HOSPITAL OUTPT CLINIC VISIT: HCPCS | Performed by: STUDENT IN AN ORGANIZED HEALTH CARE EDUCATION/TRAINING PROGRAM

## 2021-03-18 RX ORDER — HYDROCODONE BITARTRATE AND ACETAMINOPHEN 5; 325 MG/1; MG/1
1 TABLET ORAL DAILY PRN
Qty: 30 TABLET | Refills: 0 | Status: SHIPPED | OUTPATIENT
Start: 2021-03-18 | End: 2021-03-22 | Stop reason: SDUPTHER

## 2021-03-18 NOTE — PROGRESS NOTES
CHIEF COMPLAINT  Chief Complaint   Patient presents with   • Shoulder Pain     RIGHT. TAKING GABAPENTIN.        Primary Care  Sravanthi, Sade POLK MD    Subjective   Genevieve Kuo is a 66 y.o. female  who presents for chronic radicular neck pain as well as right shoulder pain.  She states that she is had longstanding pain in the neck as she used to work in a restaurant waiting tables carry heavy trays.  She states that most recently, she was helping to lift a calf and felt her shoulder pull and since that time has had continued pain.  She is currently working with physical therapy which offers her minimal benefit.  She will occasionally take Tylenol, but is allergic to NSAIDs.  She denies any significant numbness or weakness in the hands describes it primarily as a sharp, stabbing pain into the shoulder as well as aching occasional stabbing pains in the neck.    History of Present Illness     Location: Neck, right shoulder  Onset: The neck many years ago, the right shoulder approximately a year ago  Duration: Progressively worsening  Timing: Constant throughout the day  Quality: Sharp, stabbing pains with occasional burning sensations  Severity: Today: 6       Last Week: 6       Worst: 7  Modifying Factors: The pain is worse with any type of physical activity and moving and lifting.  Pain is slightly improved with rest and acetaminophen and TENS unit    Physical Therapy: yes    Interval Update 03/18/2021: No significant relief from the cervical epidural steroid injection.  We reviewed her films of the MRI of the right shoulder showing significant degenerative change and partial-thickness tears of several tendons as well as significant tendinopathy.    The following portions of the patient's history were reviewed and updated as appropriate: allergies, current medications, past family history, past medical history, past social history, past surgical history and problem list.      Current Outpatient Medications:   •   "albuterol (PROVENTIL) (2.5 MG/3ML) 0.083% nebulizer solution, Inhale As Needed., Disp: , Rfl:   •  atorvastatin (LIPITOR) 10 MG tablet, Take 1 tablet by mouth Daily., Disp: 90 tablet, Rfl: 4  •  escitalopram (LEXAPRO) 20 MG tablet, Take 1 tablet by mouth Daily., Disp: 90 tablet, Rfl: 4  •  gabapentin (NEURONTIN) 300 MG capsule, Take 1 capsule by mouth 2 (Two) Times a Day., Disp: 60 capsule, Rfl: 12  •  levothyroxine (SYNTHROID, LEVOTHROID) 88 MCG tablet, Take 1 tablet by mouth Daily., Disp: 90 tablet, Rfl: 4  •  montelukast (SINGULAIR) 10 MG tablet, Take 1 tablet by mouth Daily., Disp: 90 tablet, Rfl: 4  •  Omega-3 Fatty Acids (FISH OIL) 1000 MG capsule capsule, Take 1 capsule by mouth 3 (Three) Times a Day., Disp: , Rfl:   •  Probiotic capsule, Take 1 capsule by mouth Daily., Disp: , Rfl:   •  tiZANidine (ZANAFLEX) 4 MG tablet, Take 1 tablet by mouth At Night As Needed for Muscle Spasms., Disp: 30 tablet, Rfl: 0  •  HYDROcodone-acetaminophen (NORCO) 5-325 MG per tablet, Take 1 tablet by mouth Daily As Needed for Severe Pain ., Disp: 30 tablet, Rfl: 0    Review of Systems   Musculoskeletal: Positive for neck pain and neck stiffness.        Right shoulder pain   Neurological: Negative for weakness and numbness.       Vitals:    03/18/21 1333   BP: 120/69   Pulse: 83   Resp: 16   SpO2: 94%   Weight: 89.4 kg (197 lb)   Height: 170.2 cm (67\")   PainSc:   7       Objective   Physical Exam  Vitals and nursing note reviewed.   Constitutional:       General: She is not in acute distress.     Appearance: Normal appearance. She is obese.   Neck:      Comments: Cervical spine exam:  1.  Minimally tender bilateral paraspinal musculature  2.  Minimally tender bilateral right greater than left cervical facets  3.  Cervical facet loading weakly positive on the right  4.  Spurling positive bilaterally  Musculoskeletal:      Right shoulder: Tenderness present. No swelling. Decreased range of motion. Decreased strength. "   Neurological:      General: No focal deficit present.      Mental Status: She is alert and oriented to person, place, and time.           Assessment/Plan   Problems Addressed this Visit        Other    Acute pain of right shoulder - Primary      Diagnoses       Codes Comments    Acute pain of right shoulder    -  Primary ICD-10-CM: M25.511  ICD-9-CM: 719.41           Plan:  1. Given her lack of relief with the cervical epidural, will focus primarily on the shoulder  2. We will schedule for right shoulder glenohumeral joint injection for significant tendinopathy.  We discussed surgical referral, however she elects to proceed conservatively at this time  3. She also has a significant component of bilateral sacroiliitis on exam.  We will schedule as well for bilateral SI joint injections  4. We will also start hydrocodone 5 mg daily as needed for breakthrough pain  5. UDS and contract today  6. We discussed that spinal cord stimulator may be in her future if she continues to have pain that is not relieved with intra-articular steroid injections and she continues to deny surgery.  --- Follow-up next available for cervical epidural           INSPECT REPORT    As part of the patient's treatment plan, I may be prescribing controlled substances. The patient has been made aware of appropriate use of such medications, including potential risk of somnolence, limited ability to drive and/or work safely, and the potential for dependence or overdose. It has also bee made clear that these medications are for use by this patient only, without concomitant use of alcohol or other substances unless prescribed.     Patient has completed prescribing agreement detailing terms of continued prescribing of controlled substances, including monitoring ALYCE reports, urine drug screening, and pill counts if necessary. The patient is aware that inappropriate use will results in cessation of prescribing such medications.    INSPECT report has  been reviewed and scanned into the patient's chart.    As the clinician, I personally reviewed the INSPECT from 3/16/2021.    History and physical exam exhibit continued safe and appropriate use of controlled substances.      EMR Dragon/Transcription disclaimer:   Much of this encounter note is an electronic transcription/translation of spoken language to printed text. The electronic translation of spoken language may permit erroneous, or at times, nonsensical words or phrases to be inadvertently transcribed; Although I have reviewed the note for such errors, some may still exist.

## 2021-03-22 ENCOUNTER — HOSPITAL ENCOUNTER (OUTPATIENT)
Dept: PAIN MEDICINE | Facility: HOSPITAL | Age: 66
Discharge: HOME | End: 2021-03-22

## 2021-03-22 VITALS
TEMPERATURE: 97.7 F | BODY MASS INDEX: 30.92 KG/M2 | RESPIRATION RATE: 16 BRPM | HEIGHT: 67 IN | WEIGHT: 197 LBS | HEART RATE: 75 BPM | OXYGEN SATURATION: 95 % | SYSTOLIC BLOOD PRESSURE: 131 MMHG | DIASTOLIC BLOOD PRESSURE: 81 MMHG

## 2021-03-22 DIAGNOSIS — M25.50 ARTHRALGIA OF MULTIPLE SITES: ICD-10-CM

## 2021-03-22 DIAGNOSIS — M25.511 ACUTE PAIN OF RIGHT SHOULDER: Primary | ICD-10-CM

## 2021-03-22 DIAGNOSIS — R52 PAIN: ICD-10-CM

## 2021-03-22 PROCEDURE — 77002 NEEDLE LOCALIZATION BY XRAY: CPT | Performed by: STUDENT IN AN ORGANIZED HEALTH CARE EDUCATION/TRAINING PROGRAM

## 2021-03-22 PROCEDURE — 77003 FLUOROGUIDE FOR SPINE INJECT: CPT

## 2021-03-22 PROCEDURE — 25010000002 METHYLPREDNISOLONE PER 40 MG

## 2021-03-22 PROCEDURE — 0 IOPAMIDOL 41 % SOLUTION: Performed by: STUDENT IN AN ORGANIZED HEALTH CARE EDUCATION/TRAINING PROGRAM

## 2021-03-22 PROCEDURE — 20610 DRAIN/INJ JOINT/BURSA W/O US: CPT | Performed by: STUDENT IN AN ORGANIZED HEALTH CARE EDUCATION/TRAINING PROGRAM

## 2021-03-22 RX ORDER — BUPIVACAINE HYDROCHLORIDE 2.5 MG/ML
INJECTION, SOLUTION EPIDURAL; INFILTRATION; INTRACAUDAL
Status: DISCONTINUED
Start: 2021-03-22 | End: 2021-03-23 | Stop reason: HOSPADM

## 2021-03-22 RX ORDER — HYDROCODONE BITARTRATE AND ACETAMINOPHEN 5; 325 MG/1; MG/1
1 TABLET ORAL DAILY PRN
Qty: 30 TABLET | Refills: 0 | Status: SHIPPED | OUTPATIENT
Start: 2021-03-22 | End: 2021-04-15 | Stop reason: SDUPTHER

## 2021-03-22 RX ORDER — METHYLPREDNISOLONE ACETATE 40 MG/ML
INJECTION, SUSPENSION INTRA-ARTICULAR; INTRALESIONAL; INTRAMUSCULAR; SOFT TISSUE
Status: DISCONTINUED
Start: 2021-03-22 | End: 2021-03-23 | Stop reason: HOSPADM

## 2021-03-22 RX ORDER — METHYLPREDNISOLONE ACETATE 40 MG/ML
40 INJECTION, SUSPENSION INTRA-ARTICULAR; INTRALESIONAL; INTRAMUSCULAR; SOFT TISSUE ONCE
Status: COMPLETED | OUTPATIENT
Start: 2021-03-22 | End: 2021-03-22

## 2021-03-22 RX ORDER — BUPIVACAINE HYDROCHLORIDE 2.5 MG/ML
10 INJECTION, SOLUTION EPIDURAL; INFILTRATION; INTRACAUDAL ONCE
Status: COMPLETED | OUTPATIENT
Start: 2021-03-22 | End: 2021-03-22

## 2021-03-22 RX ADMIN — IOPAMIDOL 3 ML: 408 INJECTION, SOLUTION INTRATHECAL at 15:37

## 2021-03-22 RX ADMIN — BUPIVACAINE HYDROCHLORIDE 10 ML: 2.5 INJECTION, SOLUTION EPIDURAL; INFILTRATION; INTRACAUDAL at 15:37

## 2021-03-22 RX ADMIN — METHYLPREDNISOLONE ACETATE 40 MG: 40 INJECTION, SUSPENSION INTRA-ARTICULAR; INTRALESIONAL; INTRAMUSCULAR; SOFT TISSUE at 15:37

## 2021-03-22 NOTE — PROCEDURES
Right glenohumeral joint injection under fluoroscopy     [facility]     PREOPERATIVE DIAGNOSIS: Right shoulder osteoarthritis     POSTOPERATIVE DIAGNOSIS:  Same as preop diagnosis     PROCEDURE:   Right glenohumeral joint injection under fluoroscopy      PRE-PROCEDURE DISCUSSION WITH PATIENT:    Risks and complications were discussed with the patient prior to starting the procedure and informed consent was obtained.  We discussed various topics including but not limited to bleeding, infection, injury, nerve injury, paralysis, coma, death, postprocedural painful flare-up, postprocedural site soreness, and a lack of pain relief.        SURGEON: Gene Bolivar MD     REASON FOR PROCEDURE:    Right shoulder pain, right shoulder osteoarthritis, degenerative joint disease     SEDATION:  Patient declined administration of moderate sedation    ANESTHETIC AGENT:  Marcaine 0.25%  STEROID AGENT:  Methylprednisolone (DEPO MEDROL) 40mg/ml  Total volume of injected solution:   5 mL      DESCRIPTON OF PROCEDURE:  After obtaining informed consent, an I.V. was not started in the preoperative area. The patient taken to the operating room and was placed in the supine position.  All pressure points were well padded.  The appropriate area was prepped with Chloraprep and draped in a sterile fashion.      The Right shoulder was externally rotated and held in an appropriate position.  Under fluoroscopic guidance, a 25-gauge spinal needle was advanced slowly until the tip of the needle contacted the neck of the humerus just inferior and medial to the head.  Aspiration was negative for blood.  2 cc of contrast was then injected demonstrating adequate spread in the glenohumeral joint and fully surrounding the head of the humerus.  A mixture of 4 cc of the above anesthetic and steroid were then injected.     The needle was removed intact.  Vital signs were stable throughout.       ESTIMATED BLOOD LOSS:  <5 mL    SPECIMENS:  none      COMPLICATIONS:   No complications were noted. and There was no indication of vascular uptake on live injection of contrast dye.     TOLERANCE & DISCHARGE CONDITION:    The patient tolerated the procedure well.  The patient was transported to the recovery area without difficulties.  The patient was discharged to home under the care of family in stable and satisfactory condition.     PLAN OF CARE:  1. The patient was given our standard instruction sheet.  2. The patient will Plan for bilateral SIJ 2 days  3. The patient will resume all medications as per the medication reconciliation sheet.

## 2021-03-22 NOTE — DISCHARGE INSTRUCTIONS
Joint Steroid Injection  A joint steroid injection is a procedure to relieve swelling and pain in a joint. Steroids are medicines that reduce inflammation. In this procedure, your health care provider uses a syringe and a needle to inject a steroid medicine into a painful and inflamed joint. A pain-relieving medicine (anesthetic) may be injected along with the steroid. In some cases, your health care provider may use an imaging technique such as ultrasound or fluoroscopy to guide the injection.  Joints that are often treated with steroid injections include the knee, shoulder, hip, and spine. These injections may also be used in the elbow, ankle, and joints of the hands or feet. You may have joint steroid injections as part of your treatment for inflammation caused by:  · Gout.  · Rheumatoid arthritis.  · Advanced wear-and-tear arthritis (osteoarthritis).  · Tendinitis.  · Bursitis.  Joint steroid injections may be repeated, but having them too often can damage a joint or the skin over the joint. You should not have joint steroid injections less than 6 weeks apart or more than four times a year.  Tell a health care provider about:  · Any allergies you have.  · All medicines you are taking, including vitamins, herbs, eye drops, creams, and over-the-counter medicines.  · Any problems you or family members have had with anesthetic medicines.  · Any blood disorders you have.  · Any surgeries you have had.  · Any medical conditions you have.  · Whether you are pregnant or may be pregnant.  What are the risks?  Generally, this is a safe treatment. However, problems may occur, including:  · Infection.  · Bleeding.  · Allergic reactions to medicines.  · Damage to the joint or tissues around the joint.  · Thinning of skin or loss of skin color over the joint.  · Temporary flushing of the face or chest.  · Temporary increase in pain.  · Temporary increase in blood sugar.  · Failure to relieve inflammation or pain.  What  happens before the treatment?  · You may have imaging tests of your joint.  · Ask your health care provider about:  ? Changing or stopping your regular medicines. This is especially important if you are taking diabetes medicines or blood thinners.  ? Taking medicines such as aspirin and ibuprofen. These medicines can thin your blood. Do not take these medicines unless your health care provider tells you to take them.  ? Taking over-the-counter medicines, vitamins, herbs, and supplements.  · Ask your health care provider if you can drive yourself home after the procedure.  What happens during the treatment?    · Your health care provider will position you for the injection and locate the injection site over your joint.  · The skin over the joint will be cleaned with a germ-killing soap.  · Your health care provider may:  ? Spray a numbing solution (topical anesthetic) over the injection site.  ? Inject a local anesthetic under the skin above your joint.  · The needle will be placed through your skin into your joint. Your health care provider may use imaging to guide the needle to the right spot for the injection. If imaging is used, a special contrast dye may be injected to confirm that the needle is in the correct location.  · The steroid medicine will be injected into your joint.  · Anesthetic may be injected along with the steroid. This may be a medicine that relieves pain for a short time (short-acting anesthetic) or for a longer time (long-acting anesthetic).  · The needle will be removed, and an adhesive bandage (dressing) will be placed over the injection site.  The procedure may vary among health care providers and hospitals.  What can I expect after the treatment?  · You will be able to go home after the treatment.  · It is normal to feel slight flushing for a few days after the injection.  · After the treatment, it is common to have an increase in joint pain after the anesthetic has worn off. This may  happen about an hour after a short-acting anesthetic or about 8 hours after a longer-acting anesthetic.  · You should begin to feel relief from joint pain and swelling after 24 to 48 hours.  Follow these instructions at home:  Injection site care  · Leave the adhesive dressing over your injection site in place until your health care provider says you can remove it.  · Check your injection site every day for signs of infection. Check for:  ? Redness, swelling, or pain.  ? Fluid or blood.  ? Warmth.  ? Pus or a bad smell.  Activity  · Return to your normal activities as told by your health care provider. Ask your health care provider what activities are safe for you. You may be asked to limit activities that put stress on the joint for a few days.  · Do joint exercises as told by your health care provider.  · Do not take baths, swim, or use a hot tub until your health care provider approves.  Managing pain, stiffness, and swelling    · If directed, put ice on the joint.  ? Put ice in a plastic bag.  ? Place a towel between your skin and the bag.  ? Leave the ice on for 20 minutes, 2-3 times a day.  · Raise (elevate) your joint above the level of your heart when you are sitting or lying down.  General instructions  · Take over-the-counter and prescription medicines only as told by your health care provider.  · Do not use any products that contain nicotine or tobacco, such as cigarettes, e-cigarettes, and chewing tobacco. These can delay joint healing. If you need help quitting, ask your health care provider.  · If you have diabetes, be aware that your blood sugar may be slightly elevated for several days after the injection.  · Keep all follow-up visits as told by your health care provider. This is important.  Contact a health care provider if you have:  · Chills or a fever.  · Any signs of infection at your injection site.  · Increased pain or swelling or no relief after 2 days.  Summary  · A joint steroid injection  is a treatment to relieve pain and swelling in a joint.  · Steroids are medicines that reduce inflammation. Your health care provider may add an anesthetic along with the steroid.  · You may have joint steroid injections as part of your arthritis treatment.  · Joint steroid injections may be repeated, but having them too often can damage a joint or the skin over the joint.  · Contact your health care provider if you have a fever, chills, or signs of infection or if you get no relief from joint pain or swelling.  This information is not intended to replace advice given to you by your health care provider. Make sure you discuss any questions you have with your health care provider.  Document Revised: 08/20/2019 Document Reviewed: 08/20/2019  Elsevier Patient Education © 2021 [employer]

## 2021-03-24 ENCOUNTER — HOSPITAL ENCOUNTER (OUTPATIENT)
Dept: PAIN MEDICINE | Facility: HOSPITAL | Age: 66
Discharge: HOME | End: 2021-03-24

## 2021-03-24 VITALS
RESPIRATION RATE: 16 BRPM | HEIGHT: 67 IN | TEMPERATURE: 97.3 F | HEART RATE: 66 BPM | DIASTOLIC BLOOD PRESSURE: 88 MMHG | BODY MASS INDEX: 30.92 KG/M2 | WEIGHT: 197 LBS | SYSTOLIC BLOOD PRESSURE: 148 MMHG | OXYGEN SATURATION: 96 %

## 2021-03-24 DIAGNOSIS — M46.1 BILATERAL SACROILIITIS (HCC): Primary | ICD-10-CM

## 2021-03-24 DIAGNOSIS — R52 PAIN: ICD-10-CM

## 2021-03-24 PROCEDURE — 27096 INJECT SACROILIAC JOINT: CPT | Performed by: STUDENT IN AN ORGANIZED HEALTH CARE EDUCATION/TRAINING PROGRAM

## 2021-03-24 PROCEDURE — 25010000002 METHYLPREDNISOLONE PER 40 MG

## 2021-03-24 PROCEDURE — 77003 FLUOROGUIDE FOR SPINE INJECT: CPT

## 2021-03-24 PROCEDURE — 0 IOPAMIDOL 41 % SOLUTION: Performed by: STUDENT IN AN ORGANIZED HEALTH CARE EDUCATION/TRAINING PROGRAM

## 2021-03-24 RX ORDER — BUPIVACAINE HYDROCHLORIDE 2.5 MG/ML
10 INJECTION, SOLUTION EPIDURAL; INFILTRATION; INTRACAUDAL ONCE
Status: COMPLETED | OUTPATIENT
Start: 2021-03-24 | End: 2021-03-24

## 2021-03-24 RX ORDER — METHYLPREDNISOLONE ACETATE 40 MG/ML
INJECTION, SUSPENSION INTRA-ARTICULAR; INTRALESIONAL; INTRAMUSCULAR; SOFT TISSUE
Status: DISPENSED
Start: 2021-03-24 | End: 2021-03-24

## 2021-03-24 RX ORDER — METHYLPREDNISOLONE ACETATE 40 MG/ML
40 INJECTION, SUSPENSION INTRA-ARTICULAR; INTRALESIONAL; INTRAMUSCULAR; SOFT TISSUE ONCE
Status: COMPLETED | OUTPATIENT
Start: 2021-03-24 | End: 2021-03-24

## 2021-03-24 RX ORDER — BUPIVACAINE HYDROCHLORIDE 2.5 MG/ML
INJECTION, SOLUTION EPIDURAL; INFILTRATION; INTRACAUDAL
Status: DISPENSED
Start: 2021-03-24 | End: 2021-03-24

## 2021-03-24 RX ADMIN — METHYLPREDNISOLONE ACETATE 40 MG: 40 INJECTION, SUSPENSION INTRA-ARTICULAR; INTRALESIONAL; INTRAMUSCULAR; SOFT TISSUE at 10:50

## 2021-03-24 RX ADMIN — BUPIVACAINE HYDROCHLORIDE 7 ML: 2.5 INJECTION, SOLUTION EPIDURAL; INFILTRATION; INTRACAUDAL at 10:50

## 2021-03-24 RX ADMIN — IOPAMIDOL 3 ML: 408 INJECTION, SOLUTION INTRATHECAL at 10:29

## 2021-03-24 NOTE — DISCHARGE INSTRUCTIONS
Sacroiliac Joint Injection, Care After  This sheet gives you information about how to care for yourself after your procedure. Your health care provider may also give you more specific instructions. If you have problems or questions, contact your health care provider.  What can I expect after the procedure?  After the procedure, it is common to have bruising or soreness at the injection site.  Follow these instructions at home:  Managing pain and swelling         · Keep a record of your pain (pain log) to share with your health care provider at your follow-up visit. If a long-acting anti-inflammatory medicine (steroid) was included in your injection, you may not notice an improvement in your pain level for a few days.  · Take over-the-counter and prescription medicines only as told by your health care provider.  · Do not use a heating pad and do not apply heat directly to the area after the procedure.  · Ask your health care provider about using cold therapy.  · If directed, put ice on the affected area.  ? Put ice in a plastic bag.  ? Place a towel between your skin and the bag.  ? Leave the ice on for 20 minutes, 2-3 times a day. Do not use cold therapy for more than 24 hours.  · Check your injection site every day for signs of infection. Check for:  ? Redness, swelling, or pain.  ? Fluid or blood.  ? Warmth.  ? Pus or a bad smell.  Activity  · Rest the day of your injection. Avoid doing a lot of activity on the day of the procedure.  · Avoid any activities that take a lot of effort for 24 hours after the injection.  · Return to your normal activities as told by your health care provider. Ask your health care provider what activities are safe for you.  · Do physical therapy exercises as told by your health care provider or physical therapist. These exercises are used to strengthen muscles surrounding the SI joint, and that will help to relieve pain.  General instructions  · If dye was used during your procedure,  drink plenty of water to flush the dye out of your body.  · Do not take baths, swim, or use a hot tub until your health care provider approves. You may take showers.  · Do not drive for 24 hours if you were given a medicine to help you relax (sedative) during your procedure.  · Do not use any products that contain nicotine or tobacco, such as cigarettes and e-cigarettes. If you need help quitting, ask your health care provider.  · Keep all follow-up visits as told by your health care provider. This is important.  Contact a health care provider if:  · Your pain does not improve or it gets worse.  · You have numbness, tingling, or weakness.  · You have a fever.  · You have redness, swelling, or pain around your injection site.  · You have fluid or blood coming from your injection site.  · Your injection site feels warm to the touch.  · You have pus or a bad smell coming from your injection site.  Get help right away if:  · You have chest pain or shortness of breath.  Summary  · After the procedure, it is common to have bruising or soreness at the injection site.  · Keep a record of your pain (pain log) to share with your health care provider at your follow-up visit.  · Return to your normal activities as told by your health care provider. Ask your health care provider what activities are safe for you.  · Contact your health care provider if you have pain that is getting worse, weakness, numbness, or any sign of infection at your injection site.  This information is not intended to replace advice given to you by your health care provider. Make sure you discuss any questions you have with your health care provider.  Document Revised: 11/30/2018 Document Reviewed: 09/24/2018  Elsevier Patient Education © 2021 [employer]

## 2021-03-24 NOTE — ADDENDUM NOTE
Encounter addended by: Margarita Pastor RN on: 3/24/2021 11:27 AM   Actions taken: Flowsheet accepted

## 2021-03-24 NOTE — PROCEDURES
Bilateral Sacroiliac Joint Injection  [facility]      PREOPERATIVE DIAGNOSIS:   Sacroiliac joint dysfunction, bilateral    POSTOPERATIVE DIAGNOSIS:  Sacroiliac joint dysfunction, bilateral    PROCEDURE:  Sacroiliac Joint Injection, Bilaterally, with fluoroscopic guidance    PRE-PROCEDURE DISCUSSION WITH PATIENT:    Risks and complications were discussed with the patient prior to starting the procedure and informed consent was obtained.  We discussed various topics including but not limited to bleeding, infection, injury, postprocedural site soreness, painful flareup, worsening of clinical picture, paralysis, coma, and death.     SURGEON:  Hakan Bolivar MD    REASON FOR PROCEDURE:    Patient has pain consistent with SI pathology on history and physical exam. Positive sacroiliac provocation maneuvers noted.   Tender to palpation over the affected SI joint. Positive FABERE.     SEDATION:  Patient declined administration of moderate sedation    ANESTHETIC AGENT:  Marcaine 0.25%  STEROID AGENT:  Methylprednisolone (DEPO MEDROL) 40mg/ml    DESCRIPTON OF PROCEDURE:  After obtaining informed consent, IV access was not obtained in the preoperative area.  The patient was transported to the operative suite and placed in the prone position with a pillow under the pelvic area.  Under fluoroscopic guidance the inferior most portion of the right SI joint was identified. The overlying skin and subcutaneous tissue was anesthetized with 1% lidocaine. A 25-gauge spinal needle was introduced from the inferior most portion of the joint into the RIGHT SI joint under fluoroscopic guidance in the AP dimension with slight oblique rotation to the contralateral side.  Aspiration was negative.  After confirming the position of the needle with fluoroscopy, 1 mL of Omnipaque was injected and after seeing appropriate spread into the joint a total of 2mL Marcaine, with the steroid, was injected very slowly.  Needle was removed intact.   A similar procedure was performed on the LEFT side.   Vital signs remained stable.  The onset of analgesia was noted.      ESTIMATED BLOOD LOSS:  minimal  SPECIMENS:  None  COMPLICATIONS:  No complications were noted., There was no indication of vascular uptake on live injection of contrast dye., There was no indication of intrathecal uptake on live injection of contrast dye. and There was not any evidence of dural puncture.      TOLERANCE & DISCHARGE CONDITION:    The patient tolerated the procedure well.  The patient was transported to the recovery area without difficulties.  The patient was discharged to home under the care of family in stable and satisfactory condition.    PLAN OF CARE:  1. The patient was given our standard instruction sheet and will resume all medications as per the medication reconciliation sheet.  2. The patient will Return to clinic 2-3 wks.  3. The patient is instructed to keep a pain log hourly for 8 hours after the procedure.

## 2021-04-15 ENCOUNTER — OFFICE VISIT (OUTPATIENT)
Dept: PAIN MEDICINE | Facility: CLINIC | Age: 66
End: 2021-04-15

## 2021-04-15 VITALS
BODY MASS INDEX: 30.92 KG/M2 | HEIGHT: 67 IN | RESPIRATION RATE: 16 BRPM | DIASTOLIC BLOOD PRESSURE: 81 MMHG | SYSTOLIC BLOOD PRESSURE: 144 MMHG | HEART RATE: 72 BPM | WEIGHT: 197 LBS | TEMPERATURE: 96.4 F | OXYGEN SATURATION: 95 %

## 2021-04-15 DIAGNOSIS — M25.50 ARTHRALGIA OF MULTIPLE SITES: ICD-10-CM

## 2021-04-15 PROCEDURE — G0463 HOSPITAL OUTPT CLINIC VISIT: HCPCS | Performed by: STUDENT IN AN ORGANIZED HEALTH CARE EDUCATION/TRAINING PROGRAM

## 2021-04-15 PROCEDURE — 99214 OFFICE O/P EST MOD 30 MIN: CPT | Performed by: STUDENT IN AN ORGANIZED HEALTH CARE EDUCATION/TRAINING PROGRAM

## 2021-04-15 RX ORDER — HYDROCODONE BITARTRATE AND ACETAMINOPHEN 5; 325 MG/1; MG/1
1 TABLET ORAL DAILY PRN
Qty: 30 TABLET | Refills: 0 | Status: SHIPPED | OUTPATIENT
Start: 2021-04-15 | End: 2021-05-13 | Stop reason: SDUPTHER

## 2021-04-15 NOTE — PROGRESS NOTES
CHIEF COMPLAINT  Chief Complaint   Patient presents with   • Shoulder Pain     RIGHT. NORCO LAST DOSE 4/14/21 1400. INJECTION HELPED SOME.    • Back Pain     LOW BACK        Primary Care  Sravanthi, Sade POLK MD    Subjective   Genevieve Kuo is a 66 y.o. female  who presents for chronic radicular neck pain as well as right shoulder pain.  She states that she is had longstanding pain in the neck as she used to work in a restaurant waiting tables carry heavy trays.  She states that most recently, she was helping to lift a calf and felt her shoulder pull and since that time has had continued pain.  She is currently working with physical therapy which offers her minimal benefit.  She will occasionally take Tylenol, but is allergic to NSAIDs.  She denies any significant numbness or weakness in the hands describes it primarily as a sharp, stabbing pain into the shoulder as well as aching occasional stabbing pains in the neck.    Back Pain  Pertinent negatives include no numbness or weakness.        Location: Neck, right shoulder  Onset: The neck many years ago, the right shoulder approximately a year ago  Duration: Progressively worsening  Timing: Constant throughout the day  Quality: Sharp, stabbing pains with occasional burning sensations  Severity: Today: 6       Last Week: 6       Worst: 7  Modifying Factors: The pain is worse with any type of physical activity and moving and lifting.  Pain is slightly improved with rest and acetaminophen and TENS unit    Physical Therapy: yes    Interval Update 04/15/2021: She reports 2 weeks of 50 to 60% pain relief from the glenohumeral joint injection.  She also reports excellent pain relief on the left side from the SI joint.  She states the right side was helpful, but less effective.  She does well with the rare hydrocodone denies any side effect such as sedation constipation.    The following portions of the patient's history were reviewed and updated as appropriate: allergies,  "current medications, past family history, past medical history, past social history, past surgical history and problem list.      Current Outpatient Medications:   •  albuterol (PROVENTIL) (2.5 MG/3ML) 0.083% nebulizer solution, Inhale As Needed., Disp: , Rfl:   •  atorvastatin (LIPITOR) 10 MG tablet, Take 1 tablet by mouth Daily., Disp: 90 tablet, Rfl: 4  •  escitalopram (LEXAPRO) 20 MG tablet, Take 1 tablet by mouth Daily., Disp: 90 tablet, Rfl: 4  •  gabapentin (NEURONTIN) 300 MG capsule, Take 1 capsule by mouth 2 (Two) Times a Day., Disp: 60 capsule, Rfl: 12  •  HYDROcodone-acetaminophen (NORCO) 5-325 MG per tablet, Take 1 tablet by mouth Daily As Needed for Severe Pain ., Disp: 30 tablet, Rfl: 0  •  levothyroxine (SYNTHROID, LEVOTHROID) 88 MCG tablet, Take 1 tablet by mouth Daily., Disp: 90 tablet, Rfl: 4  •  montelukast (SINGULAIR) 10 MG tablet, Take 1 tablet by mouth Daily., Disp: 90 tablet, Rfl: 4  •  Omega-3 Fatty Acids (FISH OIL) 1000 MG capsule capsule, Take 1 capsule by mouth 3 (Three) Times a Day., Disp: , Rfl:   •  Probiotic capsule, Take 1 capsule by mouth Daily., Disp: , Rfl:   •  tiZANidine (ZANAFLEX) 4 MG tablet, Take 1 tablet by mouth At Night As Needed for Muscle Spasms., Disp: 30 tablet, Rfl: 0    Review of Systems   Musculoskeletal: Positive for back pain, neck pain and neck stiffness.        Right shoulder pain   Neurological: Negative for weakness and numbness.       Vitals:    04/15/21 1009   BP: 144/81   Pulse: 72   Resp: 16   Temp: 96.4 °F (35.8 °C)   SpO2: 95%   Weight: 89.4 kg (197 lb)   Height: 170.2 cm (67\")   PainSc:   7       Objective   Physical Exam  Vitals and nursing note reviewed.   Constitutional:       General: She is not in acute distress.     Appearance: Normal appearance. She is obese.   Neck:      Comments: Cervical spine exam:  1.  Minimally tender bilateral paraspinal musculature  2.  Minimally tender bilateral right greater than left cervical facets  3.  Cervical facet " loading weakly positive on the right  4.  Spurling positive bilaterally  Musculoskeletal:      Right shoulder: Tenderness present. No swelling. Decreased range of motion. Decreased strength.   Neurological:      General: No focal deficit present.      Mental Status: She is alert and oriented to person, place, and time.           Assessment/Plan   Problems Addressed this Visit        Other    Arthralgia of multiple sites    Relevant Medications    HYDROcodone-acetaminophen (NORCO) 5-325 MG per tablet      Diagnoses       Codes Comments    Arthralgia of multiple sites     ICD-10-CM: M25.50  ICD-9-CM: 719.49           Plan:  1. Given her excellent, but limited duration of relief with the right glenohumeral joint and her desire to forego any surgery, we will attempt right-sided suprascapular nerve block with RFA if beneficial  2. Can repeat bilateral SI as in several months  3. Refill hydrocodone daily as needed  4. UDS and inspect appropriate    --- Follow-up right suprascapular nerve block           INSPECT REPORT    As part of the patient's treatment plan, I may be prescribing controlled substances. The patient has been made aware of appropriate use of such medications, including potential risk of somnolence, limited ability to drive and/or work safely, and the potential for dependence or overdose. It has also bee made clear that these medications are for use by this patient only, without concomitant use of alcohol or other substances unless prescribed.     Patient has completed prescribing agreement detailing terms of continued prescribing of controlled substances, including monitoring ALYCE reports, urine drug screening, and pill counts if necessary. The patient is aware that inappropriate use will results in cessation of prescribing such medications.    INSPECT report has been reviewed and scanned into the patient's chart.    As the clinician, I personally reviewed the INSPECT from 4/14/2021.    History and  physical exam exhibit continued safe and appropriate use of controlled substances.      EMR Dragon/Transcription disclaimer:   Much of this encounter note is an electronic transcription/translation of spoken language to printed text. The electronic translation of spoken language may permit erroneous, or at times, nonsensical words or phrases to be inadvertently transcribed; Although I have reviewed the note for such errors, some may still exist.

## 2021-04-27 RX ORDER — TIZANIDINE 4 MG/1
4 TABLET ORAL NIGHTLY PRN
Qty: 30 TABLET | Refills: 2 | Status: SHIPPED | OUTPATIENT
Start: 2021-04-27 | End: 2021-06-17 | Stop reason: SDUPTHER

## 2021-04-30 ENCOUNTER — HOSPITAL ENCOUNTER (OUTPATIENT)
Dept: PAIN MEDICINE | Facility: HOSPITAL | Age: 66
Discharge: HOME | End: 2021-04-30

## 2021-04-30 VITALS
DIASTOLIC BLOOD PRESSURE: 82 MMHG | HEART RATE: 66 BPM | SYSTOLIC BLOOD PRESSURE: 121 MMHG | OXYGEN SATURATION: 95 % | HEIGHT: 67 IN | BODY MASS INDEX: 30.92 KG/M2 | TEMPERATURE: 97.1 F | WEIGHT: 197 LBS | RESPIRATION RATE: 16 BRPM

## 2021-04-30 DIAGNOSIS — M25.511 ACUTE PAIN OF RIGHT SHOULDER: Primary | ICD-10-CM

## 2021-04-30 DIAGNOSIS — R52 PAIN: ICD-10-CM

## 2021-04-30 PROCEDURE — 64418 NJX AA&/STRD SPRSCAP NRV: CPT | Performed by: STUDENT IN AN ORGANIZED HEALTH CARE EDUCATION/TRAINING PROGRAM

## 2021-04-30 PROCEDURE — 0 IOPAMIDOL 41 % SOLUTION: Performed by: STUDENT IN AN ORGANIZED HEALTH CARE EDUCATION/TRAINING PROGRAM

## 2021-04-30 PROCEDURE — 25010000002 DEXAMETHASONE SODIUM PHOSPHATE 10 MG/ML SOLUTION: Performed by: STUDENT IN AN ORGANIZED HEALTH CARE EDUCATION/TRAINING PROGRAM

## 2021-04-30 PROCEDURE — 77002 NEEDLE LOCALIZATION BY XRAY: CPT | Performed by: STUDENT IN AN ORGANIZED HEALTH CARE EDUCATION/TRAINING PROGRAM

## 2021-04-30 PROCEDURE — 77003 FLUOROGUIDE FOR SPINE INJECT: CPT

## 2021-04-30 RX ORDER — DEXAMETHASONE SODIUM PHOSPHATE 10 MG/ML
10 INJECTION, SOLUTION INTRAMUSCULAR; INTRAVENOUS ONCE
Status: COMPLETED | OUTPATIENT
Start: 2021-04-30 | End: 2021-04-30

## 2021-04-30 RX ORDER — BUPIVACAINE HYDROCHLORIDE 2.5 MG/ML
10 INJECTION, SOLUTION EPIDURAL; INFILTRATION; INTRACAUDAL ONCE
Status: COMPLETED | OUTPATIENT
Start: 2021-04-30 | End: 2021-04-30

## 2021-04-30 RX ADMIN — IOPAMIDOL 3 ML: 408 INJECTION, SOLUTION INTRATHECAL at 09:55

## 2021-04-30 RX ADMIN — BUPIVACAINE HYDROCHLORIDE 10 ML: 2.5 INJECTION, SOLUTION EPIDURAL; INFILTRATION; INTRACAUDAL; PERINEURAL at 09:55

## 2021-04-30 RX ADMIN — DEXAMETHASONE SODIUM PHOSPHATE 10 MG: 10 INJECTION, SOLUTION INTRAMUSCULAR; INTRAVENOUS at 09:55

## 2021-05-05 NOTE — PROCEDURES
right suprascapular nerve block/steroid injection - posterior approach  [facility]     PREOPERATIVE DIAGNOSIS:     Chronic right shoulder pain  POSTOPERATIVE DIAGNOSIS:   Same as pre-op     PROCEDURE:  13174- right suprascapular nerve block/steroid injection, with fluoroscopic and ultrasound guidance      PRE-PROCEDURE DISCUSSION WITH PATIENT:    Risks and complications were discussed with the patient prior to starting the procedure (including but not limited to infection, bleeding, injury, paralysis, and death) and informed consent was obtained.       SURGEON:  Hakan Bolivar MD     REASON FOR PROCEDURE:  Chronic R Shoulder Pain     SEDATION:     Patient declined administration of moderate sedation    ANESTHETIC:  Marcaine 0.25%  STEROID:   Methylprednisolone (DEPO MEDROL) 40mg/ml     DESCRIPTON OF PROCEDURE:  After obtaining informed consent, IV access was not obtained in the preoperative area.  The patient was transported to the operative suite and placed in supine position.  The hip area was prepped in a wide diameter with Chloraprep and draped in a sterile fashion.      Under fluoroscopy guidance the suprascapular notch was located. Superficial skin and subcutaneous tissue was anesthetized with 1% Lidocaine.  A  25-gauge spinal needle was inserted under fluoroscopic guidance and strict aseptic technique, coming into contact with the scapula. Needle was walked off the scapula superiorly and deep until entering the suprascapular notch.   After confirming the position of the needle with fluoroscopy and ultrasound, 1 mL of Omnipaque was injected and after seeing appropriate spread into the notch and posterior to the scapula, a total of 5mL of injectate including the above listed local anesthetic and steroid was injected.  Vital signs remained stable.  The onset of analgesia was noted.     ESTIMATED BLOOD LOSS:  minimal  SPECIMENS:  None     COMPLICATIONS:   No complications were noted. and  There was no indication of vascular uptake on live injection of contrast dye.     TOLERANCE & DISCHARGE CONDITION:    The patient tolerated the procedure well.  The patient was transported to the recovery area without difficulties.  The patient was discharged to home under the care of a chaperone and in satisfactory condition.     PLAN OF CARE:  1. The patient was given our standard instruction sheet.  2. The patient will Return to clinic 2-3 wks  3. The patient will resume all medications as per the medication reconciliation sheet.

## 2021-05-13 ENCOUNTER — OFFICE VISIT (OUTPATIENT)
Dept: PAIN MEDICINE | Facility: CLINIC | Age: 66
End: 2021-05-13

## 2021-05-13 ENCOUNTER — HOSPITAL ENCOUNTER (OUTPATIENT)
Dept: GENERAL RADIOLOGY | Facility: HOSPITAL | Age: 66
Discharge: HOME | End: 2021-05-13
Admitting: STUDENT IN AN ORGANIZED HEALTH CARE EDUCATION/TRAINING PROGRAM

## 2021-05-13 VITALS
HEIGHT: 67 IN | BODY MASS INDEX: 30.92 KG/M2 | SYSTOLIC BLOOD PRESSURE: 150 MMHG | WEIGHT: 197 LBS | OXYGEN SATURATION: 96 % | DIASTOLIC BLOOD PRESSURE: 86 MMHG | TEMPERATURE: 96.8 F | RESPIRATION RATE: 16 BRPM | HEART RATE: 89 BPM

## 2021-05-13 DIAGNOSIS — M46.1 BILATERAL SACROILIITIS (HCC): Primary | ICD-10-CM

## 2021-05-13 DIAGNOSIS — M25.50 ARTHRALGIA OF MULTIPLE SITES: ICD-10-CM

## 2021-05-13 PROCEDURE — 72100 X-RAY EXAM L-S SPINE 2/3 VWS: CPT

## 2021-05-13 PROCEDURE — G0463 HOSPITAL OUTPT CLINIC VISIT: HCPCS | Performed by: STUDENT IN AN ORGANIZED HEALTH CARE EDUCATION/TRAINING PROGRAM

## 2021-05-13 PROCEDURE — 99214 OFFICE O/P EST MOD 30 MIN: CPT | Performed by: STUDENT IN AN ORGANIZED HEALTH CARE EDUCATION/TRAINING PROGRAM

## 2021-05-13 RX ORDER — HYDROCODONE BITARTRATE AND ACETAMINOPHEN 5; 325 MG/1; MG/1
1 TABLET ORAL DAILY PRN
Qty: 30 TABLET | Refills: 0 | Status: SHIPPED | OUTPATIENT
Start: 2021-05-13 | End: 2021-06-17 | Stop reason: SDUPTHER

## 2021-05-13 NOTE — PROGRESS NOTES
CHIEF COMPLAINT  Chief Complaint   Patient presents with   • Shoulder Pain     RIGHT. NORCO LAST DOSE 5/11/21. SHOULDER INJECTION DID NOT HELP.    • Back Pain     LOW BACK        Primary Care  Sravanthi, Sade POLK MD    Subjective   Genevieve Kuo is a 66 y.o. female  who presents for chronic radicular neck pain as well as right shoulder pain.  She states that she is had longstanding pain in the neck as she used to work in a restaurant waiting tables carry heavy trays.  She states that most recently, she was helping to lift a calf and felt her shoulder pull and since that time has had continued pain.  She is currently working with physical therapy which offers her minimal benefit.  She will occasionally take Tylenol, but is allergic to NSAIDs.  She denies any significant numbness or weakness in the hands describes it primarily as a sharp, stabbing pain into the shoulder as well as aching occasional stabbing pains in the neck.    Back Pain  Pertinent negatives include no numbness or weakness.        Location: Neck, right shoulder  Onset: The neck many years ago, the right shoulder approximately a year ago  Duration: Progressively worsening  Timing: Constant throughout the day  Quality: Sharp, stabbing pains with occasional burning sensations  Severity: Today: 3       Last Week: 5       Worst: 7  Modifying Factors: The pain is worse with any type of physical activity and moving and lifting.  Pain is slightly improved with rest and acetaminophen and TENS unit    Physical Therapy: yes    Interval Update 05/13/2021: She reports no significant pain relief from the suprascapular nerve injection.  She reports several hours of approximately 50% pain relief with a return of symptoms.  She does not feel that the relief was good enough to warrant RFA.  She does well with her hydrocodone and takes medication occasionally which does significantly decrease her pain and improve her functionality.    The following portions of the  "patient's history were reviewed and updated as appropriate: allergies, current medications, past family history, past medical history, past social history, past surgical history and problem list.      Current Outpatient Medications:   •  albuterol (PROVENTIL) (2.5 MG/3ML) 0.083% nebulizer solution, Inhale As Needed., Disp: , Rfl:   •  atorvastatin (LIPITOR) 10 MG tablet, Take 1 tablet by mouth Daily., Disp: 90 tablet, Rfl: 4  •  escitalopram (LEXAPRO) 20 MG tablet, Take 1 tablet by mouth Daily., Disp: 90 tablet, Rfl: 4  •  gabapentin (NEURONTIN) 300 MG capsule, Take 1 capsule by mouth 2 (Two) Times a Day., Disp: 60 capsule, Rfl: 12  •  HYDROcodone-acetaminophen (NORCO) 5-325 MG per tablet, Take 1 tablet by mouth Daily As Needed for Severe Pain ., Disp: 30 tablet, Rfl: 0  •  levothyroxine (SYNTHROID, LEVOTHROID) 88 MCG tablet, Take 1 tablet by mouth Daily., Disp: 90 tablet, Rfl: 4  •  montelukast (SINGULAIR) 10 MG tablet, Take 1 tablet by mouth Daily., Disp: 90 tablet, Rfl: 4  •  Omega-3 Fatty Acids (FISH OIL) 1000 MG capsule capsule, Take 1 capsule by mouth 3 (Three) Times a Day., Disp: , Rfl:   •  Probiotic capsule, Take 1 capsule by mouth Daily., Disp: , Rfl:   •  tiZANidine (ZANAFLEX) 4 MG tablet, Take 1 tablet by mouth At Night As Needed for Muscle Spasms., Disp: 30 tablet, Rfl: 2    Review of Systems   Musculoskeletal: Positive for back pain, neck pain and neck stiffness.        Right shoulder pain   Neurological: Negative for weakness and numbness.       Vitals:    05/13/21 0953   BP: 150/86   Pulse: 89   Resp: 16   Temp: 96.8 °F (36 °C)   SpO2: 96%   Weight: 89.4 kg (197 lb)   Height: 170.2 cm (67\")   PainSc:   3       Objective   Physical Exam  Vitals and nursing note reviewed.   Constitutional:       General: She is not in acute distress.     Appearance: Normal appearance. She is obese.   Neck:      Comments: Cervical spine exam:  1.  Minimally tender bilateral paraspinal musculature  2.  Minimally tender " bilateral right greater than left cervical facets  3.  Cervical facet loading weakly positive on the right  4.  Spurling positive bilaterally  Musculoskeletal:      Right shoulder: Tenderness present. No swelling. Decreased range of motion. Decreased strength.   Neurological:      General: No focal deficit present.      Mental Status: She is alert and oriented to person, place, and time.           Assessment/Plan   Problems Addressed this Visit        Other    Arthralgia of multiple sites    Relevant Medications    HYDROcodone-acetaminophen (NORCO) 5-325 MG per tablet      Other Visit Diagnoses     Bilateral sacroiliitis (CMS/HCC)    -  Primary    Relevant Orders    XR Spine Lumbar 2 or 3 View      Diagnoses       Codes Comments    Bilateral sacroiliitis (CMS/HCC)    -  Primary ICD-10-CM: M46.1  ICD-9-CM: 720.2     Arthralgia of multiple sites     ICD-10-CM: M25.50  ICD-9-CM: 719.49           Plan:  1. At this point, I do not have much else to offer for the shoulder.  She will likely need surgical intervention  2. Scheduled to repeat right SI joint next week  3. We will also obtain lumbar spine films to look for any lumbosacral spondylosis as she does continue to complain of axial type back pain  4. Refill hydrocodone 5 mg daily  5. UDS inspect appropriate    --- Follow-up next week for repeat SI joint           INSPECT REPORT    As part of the patient's treatment plan, I may be prescribing controlled substances. The patient has been made aware of appropriate use of such medications, including potential risk of somnolence, limited ability to drive and/or work safely, and the potential for dependence or overdose. It has also bee made clear that these medications are for use by this patient only, without concomitant use of alcohol or other substances unless prescribed.     Patient has completed prescribing agreement detailing terms of continued prescribing of controlled substances, including monitoring ALYCE reports,  urine drug screening, and pill counts if necessary. The patient is aware that inappropriate use will results in cessation of prescribing such medications.    INSPECT report has been reviewed and scanned into the patient's chart.    As the clinician, I personally reviewed the INSPECT from 5/11/2021.    History and physical exam exhibit continued safe and appropriate use of controlled substances.      EMR Dragon/Transcription disclaimer:   Much of this encounter note is an electronic transcription/translation of spoken language to printed text. The electronic translation of spoken language may permit erroneous, or at times, nonsensical words or phrases to be inadvertently transcribed; Although I have reviewed the note for such errors, some may still exist.

## 2021-05-20 ENCOUNTER — HOSPITAL ENCOUNTER (OUTPATIENT)
Dept: PAIN MEDICINE | Facility: HOSPITAL | Age: 66
Discharge: HOME | End: 2021-05-20

## 2021-05-20 VITALS
OXYGEN SATURATION: 96 % | DIASTOLIC BLOOD PRESSURE: 82 MMHG | RESPIRATION RATE: 16 BRPM | TEMPERATURE: 96.9 F | HEIGHT: 67 IN | BODY MASS INDEX: 30.92 KG/M2 | HEART RATE: 76 BPM | WEIGHT: 197 LBS | SYSTOLIC BLOOD PRESSURE: 135 MMHG

## 2021-05-20 DIAGNOSIS — M46.1 BILATERAL SACROILIITIS (HCC): Primary | ICD-10-CM

## 2021-05-20 DIAGNOSIS — R52 PAIN: ICD-10-CM

## 2021-05-20 PROCEDURE — 0 IOPAMIDOL 41 % SOLUTION: Performed by: STUDENT IN AN ORGANIZED HEALTH CARE EDUCATION/TRAINING PROGRAM

## 2021-05-20 PROCEDURE — 27096 INJECT SACROILIAC JOINT: CPT | Performed by: STUDENT IN AN ORGANIZED HEALTH CARE EDUCATION/TRAINING PROGRAM

## 2021-05-20 PROCEDURE — 77003 FLUOROGUIDE FOR SPINE INJECT: CPT

## 2021-05-20 PROCEDURE — 25010000002 METHYLPREDNISOLONE PER 40 MG: Performed by: STUDENT IN AN ORGANIZED HEALTH CARE EDUCATION/TRAINING PROGRAM

## 2021-05-20 RX ORDER — BUPIVACAINE HYDROCHLORIDE 2.5 MG/ML
10 INJECTION, SOLUTION EPIDURAL; INFILTRATION; INTRACAUDAL ONCE
Status: DISCONTINUED | OUTPATIENT
Start: 2021-05-20 | End: 2021-05-20

## 2021-05-20 RX ORDER — METHYLPREDNISOLONE ACETATE 40 MG/ML
40 INJECTION, SUSPENSION INTRA-ARTICULAR; INTRALESIONAL; INTRAMUSCULAR; SOFT TISSUE ONCE
Status: COMPLETED | OUTPATIENT
Start: 2021-05-20 | End: 2021-05-20

## 2021-05-20 RX ADMIN — IOPAMIDOL 3 ML: 408 INJECTION, SOLUTION INTRATHECAL at 15:12

## 2021-05-20 RX ADMIN — METHYLPREDNISOLONE ACETATE 40 MG: 40 INJECTION, SUSPENSION INTRA-ARTICULAR; INTRALESIONAL; INTRAMUSCULAR; SOFT TISSUE at 15:12

## 2021-05-20 NOTE — PATIENT INSTRUCTIONS
Sacroiliac Joint Injection    A sacroiliac (SI) joint injection is a procedure to inject a numbing medicine (anesthetic block)--and sometimes a strong anti-inflammatory medicine (steroid)--into the SI joint. The SI joint is the joint between two bones of the pelvis called the sacrum and the ilium. The sacrum is the bone at the base of the spine. The ilium is the large bone that forms the hip.  You may need this procedure if you have pain because of an inflamed or diseased SI joint. Various conditions can cause pain in the SI joint, including rheumatoid arthritis, gout, psoriatic arthritis, infection, or injury. SI joint pain is a common cause of low back pain. It may also cause pain in your buttock or leg.  SI joint injection may be done to:  · Find out if an anesthetic block relieves pain. This can confirm that the SI joint is the cause of pain (diagnostic use).  · Treat a painful SI joint with steroids, anesthetic medicine, or both (therapeutic use).  Tell a health care provider about:  · Any allergies you have.  · All medicines you are taking, including vitamins, herbs, eye drops, creams, and over-the-counter medicines.  · Any problems you or family members have had with anesthetic medicines.  · Any blood disorders you have.  · Any surgeries you have had.  · Any medical conditions you have.  · Whether you are pregnant or may be pregnant.  What are the risks?  Generally, this is a safe procedure. However, problems may occur, including:  · Infection.  · Bleeding.  · Nerve injury.  · Temporary increase in pain.  · Headache.  · Failure of the procedure to relieve pain.  · Bruising or soreness at the joint, in deep tissues, or at the injection site.  · Allergic reactions to medicines or dyes.  · Side effects from the steroid medicine. These may include facial flushing, increased appetite, diarrhea, and increased blood sugar.  What happens before the procedure?  · You may have a physical exam.  · You may have imaging  tests, such as an X-ray, CT scan, or MRI.  · Ask your health care provider about:  ? Changing or stopping your regular medicines. This is especially important if you are taking diabetes medicines or blood thinners.  ? Taking medicines such as aspirin and ibuprofen. These medicines can thin your blood. Do not take these medicines unless your health care provider tells you to take them.  ? Taking over-the-counter medicines, vitamins, herbs, and supplements.  · Plan to have someone take you home from the hospital or clinic.  What happens during the procedure?  · To lower your risk of infection:  ? Your health care team will wash or sanitize their hands.  ? Your skin will be washed with a germ-killing (antiseptic) solution.  · You may be given one or more of the following:  ? A medicine to help you relax (sedative).  ? A medicine to numb the area (local anesthetic). Your health care provider will inject a local anesthetic into the skin above your SI joint.  · You will be placed in the proper position on a procedure table to give the health care team the best access to your SI joint.  · An X-ray machine that produces moving X-ray images (fluoroscopy) will be placed above the procedure table.  · A long, thin needle will be inserted through your skin and down to your SI joint.  · The position of the needle will be checked with fluoroscopy imaging.  · An X-ray dye (contrast media) will be injected to make sure the needle enters the joint space. You may be asked if you feel any pain.  · Long-acting anesthetic medicine will be injected. Long-acting steroid medicine may also be injected.  · The needle will be removed, and a bandage will be placed over the injection site.  The procedure may vary among health care providers and hospitals.  What happens after the procedure?  · Your blood pressure, heart rate, breathing rate, and blood oxygen level will be monitored until the medicines you were given have worn off.  · If dye was  used, you will be told to drink plenty of water to wash (flush) the dye out of your body.  · You may be asked if you have pain relief from the injection.  · You will likely be able to go home shortly after the procedure.  · Your health care provider will give you instructions for taking care of yourself after the procedure. These may include instructions for doing physical therapy exercises.  · Do not drive for 24 hours if you were given a sedative during the procedure.  Summary  · A sacroiliac (SI) joint injection is an injection of a numbing medicine (anesthetic block)--and sometimes a strong anti-inflammatory medicine (steroid)--into the SI joint.  · You will be awake during the procedure, but the injection area will be made numb.  · If you were given a medicine to help you relax (sedative during the procedure, do not drive for at least 24 hours.  This information is not intended to replace advice given to you by your health care provider. Make sure you discuss any questions you have with your health care provider.  Document Revised: 11/30/2018 Document Reviewed: 09/24/2018  [employer] Patient Education © 2021 [employer]

## 2021-05-20 NOTE — PROCEDURES
Right Sacroiliac Joint Injection  [facility]        PREOPERATIVE DIAGNOSIS:   Sacroiliac joint dysfunction, Right     POSTOPERATIVE DIAGNOSIS:  Sacroiliac joint dysfunction, Right     PROCEDURE:  Sacroiliac Joint Injection, Right, with fluoroscopic guidance     PRE-PROCEDURE DISCUSSION WITH PATIENT:    Risks and complications were discussed with the patient prior to starting the procedure and informed consent was obtained.  We discussed various topics including but not limited to bleeding, infection, injury, postprocedural site soreness, painful flareup, worsening of clinical picture, paralysis, coma, and death.      SURGEON:  Hakan Bolivar MD     REASON FOR PROCEDURE:    Patient has pain consistent with SI pathology on history and physical exam. Positive sacroiliac provocation maneuvers noted.   Tender to palpation over the affected SI joint. Positive FABERE.      SEDATION:  Patient declined administration of moderate sedation    ANESTHETIC AGENT:  Lidocaine 1%  STEROID AGENT:  Methylprednisolone (DEPO MEDROL) 40mg/ml     DESCRIPTON OF PROCEDURE:  After obtaining informed consent, IV access was not obtained in the preoperative area.  The patient was transported to the operative suite and placed in the prone position with a pillow under the pelvic area.  Under fluoroscopic guidance the inferior most portion of the right SI joint was identified. The overlying skin and subcutaneous tissue was anesthetized with 1% lidocaine. A 25-gauge spinal needle was introduced from the inferior most portion of the joint into the RIGHT SI joint under fluoroscopic guidance in the AP dimension with slight oblique rotation to the contralateral side.  Aspiration was negative.  After confirming the position of the needle with fluoroscopy, 1 mL of Omnipaque was injected and after seeing appropriate spread into the joint a total of 4mL Marcaine, with the steroid, was injected very slowly.  Needle was removed intact.   The  onset of analgesia was noted.        ESTIMATED BLOOD LOSS:  minimal  SPECIMENS:  None  COMPLICATIONS:  No complications were noted., There was no indication of vascular uptake on live injection of contrast dye., There was no indication of intrathecal uptake on live injection of contrast dye. and There was not any evidence of dural puncture.       TOLERANCE & DISCHARGE CONDITION:    The patient tolerated the procedure well.  The patient was transported to the recovery area without difficulties.  The patient was discharged to home under the care of family in stable and satisfactory condition.     PLAN OF CARE:  1. The patient was given our standard instruction sheet and will resume all medications as per the medication reconciliation sheet.  2. The patient will Return to clinic 4 weeks  3. The patient is instructed to keep a pain log hourly for 8 hours after the procedure.

## 2021-06-17 ENCOUNTER — OFFICE VISIT (OUTPATIENT)
Dept: PAIN MEDICINE | Facility: CLINIC | Age: 66
End: 2021-06-17

## 2021-06-17 VITALS
WEIGHT: 197 LBS | RESPIRATION RATE: 16 BRPM | SYSTOLIC BLOOD PRESSURE: 140 MMHG | DIASTOLIC BLOOD PRESSURE: 70 MMHG | BODY MASS INDEX: 30.92 KG/M2 | HEART RATE: 82 BPM | TEMPERATURE: 97.3 F | HEIGHT: 67 IN | OXYGEN SATURATION: 98 %

## 2021-06-17 DIAGNOSIS — M25.50 ARTHRALGIA OF MULTIPLE SITES: ICD-10-CM

## 2021-06-17 DIAGNOSIS — M47.817 LUMBOSACRAL SPONDYLOSIS WITHOUT MYELOPATHY: Primary | ICD-10-CM

## 2021-06-17 PROCEDURE — 99214 OFFICE O/P EST MOD 30 MIN: CPT | Performed by: STUDENT IN AN ORGANIZED HEALTH CARE EDUCATION/TRAINING PROGRAM

## 2021-06-17 PROCEDURE — G0463 HOSPITAL OUTPT CLINIC VISIT: HCPCS | Performed by: STUDENT IN AN ORGANIZED HEALTH CARE EDUCATION/TRAINING PROGRAM

## 2021-06-17 RX ORDER — TIZANIDINE 4 MG/1
4 TABLET ORAL NIGHTLY PRN
Qty: 30 TABLET | Refills: 2 | Status: SHIPPED | OUTPATIENT
Start: 2021-06-17 | End: 2022-06-14 | Stop reason: SDUPTHER

## 2021-06-17 RX ORDER — HYDROCODONE BITARTRATE AND ACETAMINOPHEN 5; 325 MG/1; MG/1
1 TABLET ORAL DAILY PRN
Qty: 30 TABLET | Refills: 0 | Status: SHIPPED | OUTPATIENT
Start: 2021-06-17 | End: 2021-07-29 | Stop reason: SDUPTHER

## 2021-06-17 NOTE — PROGRESS NOTES
CHIEF COMPLAINT  Chief Complaint   Patient presents with   • Back Pain     LOW BACK NORCO LAST DOSE 6/15/21 TAKES AS NEEDED.        Primary Care  Sravanthi, Sade POLK MD    Subjective   Genevieve Kuo is a 66 y.o. female  who presents for chronic radicular neck pain as well as right shoulder pain.  She states that she is had longstanding pain in the neck as she used to work in a restaurant waiting tables carry heavy trays.  She states that most recently, she was helping to lift a calf and felt her shoulder pull and since that time has had continued pain.  She is currently working with physical therapy which offers her minimal benefit.  She will occasionally take Tylenol, but is allergic to NSAIDs.  She denies any significant numbness or weakness in the hands describes it primarily as a sharp, stabbing pain into the shoulder as well as aching occasional stabbing pains in the neck.    Back Pain  Pertinent negatives include no numbness or weakness.        Location: Neck, right shoulder  Onset: The neck many years ago, the right shoulder approximately a year ago  Duration: Progressively worsening  Timing: Constant throughout the day  Quality: Sharp, stabbing pains with occasional burning sensations  Severity: Today: 3       Last Week: 5       Worst: 7  Modifying Factors: The pain is worse with any type of physical activity and moving and lifting.  Pain is slightly improved with rest and acetaminophen and TENS unit    Physical Therapy: yes    Interval Update 06/17/2021: Doing much better from the right SI joint.  She denies any pain in the buttock.  Today, her main complaint is some low back pain around the L4-L5 area.  Doing well with her occasional hydrocodone.    The following portions of the patient's history were reviewed and updated as appropriate: allergies, current medications, past family history, past medical history, past social history, past surgical history and problem list.      Current Outpatient Medications:  "  •  albuterol (PROVENTIL) (2.5 MG/3ML) 0.083% nebulizer solution, Inhale As Needed., Disp: , Rfl:   •  atorvastatin (LIPITOR) 10 MG tablet, Take 1 tablet by mouth Daily., Disp: 90 tablet, Rfl: 4  •  escitalopram (LEXAPRO) 20 MG tablet, Take 1 tablet by mouth Daily., Disp: 90 tablet, Rfl: 4  •  gabapentin (NEURONTIN) 300 MG capsule, Take 1 capsule by mouth 2 (Two) Times a Day., Disp: 60 capsule, Rfl: 12  •  HYDROcodone-acetaminophen (NORCO) 5-325 MG per tablet, Take 1 tablet by mouth Daily As Needed for Severe Pain ., Disp: 30 tablet, Rfl: 0  •  levothyroxine (SYNTHROID, LEVOTHROID) 88 MCG tablet, Take 1 tablet by mouth Daily., Disp: 90 tablet, Rfl: 4  •  montelukast (SINGULAIR) 10 MG tablet, Take 1 tablet by mouth Daily., Disp: 90 tablet, Rfl: 4  •  Omega-3 Fatty Acids (FISH OIL) 1000 MG capsule capsule, Take 1 capsule by mouth 3 (Three) Times a Day., Disp: , Rfl:   •  Probiotic capsule, Take 1 capsule by mouth Daily., Disp: , Rfl:   •  tiZANidine (ZANAFLEX) 4 MG tablet, Take 1 tablet by mouth At Night As Needed for Muscle Spasms., Disp: 30 tablet, Rfl: 2    Review of Systems   Musculoskeletal: Positive for back pain, neck pain and neck stiffness.        Right shoulder pain   Neurological: Negative for weakness and numbness.       Vitals:    06/17/21 0923   BP: 140/70   Pulse: 82   Resp: 16   Temp: 97.3 °F (36.3 °C)   SpO2: 98%   Weight: 89.4 kg (197 lb)   Height: 170.2 cm (67\")   PainSc:   3       Objective   Physical Exam  Vitals and nursing note reviewed.   Constitutional:       General: She is not in acute distress.     Appearance: Normal appearance. She is well-developed. She is obese.   Neck:      Trachea: No tracheal deviation.      Comments: Cervical spine exam:  1.  Minimally tender bilateral paraspinal musculature  2.  Minimally tender bilateral right greater than left cervical facets  3.  Cervical facet loading weakly positive on the right  4.  Spurling positive bilaterally  Musculoskeletal:      Right " shoulder: Tenderness present. No swelling. Decreased range of motion. Decreased strength.      Comments: Lumbar Spine Exam:  Tender to palpation over the lumbar paraspinal musculature Yes  Limited range of motion secondary to pain Yes  Facet loading positive: bilateral  Facets tender to palpation: bilateral  Straight leg raise test positive: Negative       Neurological:      General: No focal deficit present.      Mental Status: She is alert and oriented to person, place, and time.           Assessment/Plan   Problems Addressed this Visit        Other    Arthralgia of multiple sites    Relevant Medications    HYDROcodone-acetaminophen (NORCO) 5-325 MG per tablet      Other Visit Diagnoses     Lumbosacral spondylosis without myelopathy    -  Primary      Diagnoses       Codes Comments    Lumbosacral spondylosis without myelopathy    -  Primary ICD-10-CM: M47.817  ICD-9-CM: 721.3     Arthralgia of multiple sites     ICD-10-CM: M25.50  ICD-9-CM: 719.49           Plan:  1. Doing well after review right SI joint.  2. We will plan for bilateral two-level lumbar medial branch blocks for lumbosacral spondylosis  3. Refill hydrocodone 5 mg daily  4. UDS inspect appropriate    --- Follow-up next week for repeat SI joint           INSPECT REPORT    As part of the patient's treatment plan, I may be prescribing controlled substances. The patient has been made aware of appropriate use of such medications, including potential risk of somnolence, limited ability to drive and/or work safely, and the potential for dependence or overdose. It has also bee made clear that these medications are for use by this patient only, without concomitant use of alcohol or other substances unless prescribed.     Patient has completed prescribing agreement detailing terms of continued prescribing of controlled substances, including monitoring ALYCE reports, urine drug screening, and pill counts if necessary. The patient is aware that inappropriate  use will results in cessation of prescribing such medications.    INSPECT report has been reviewed and scanned into the patient's chart.    As the clinician, I personally reviewed the INSPECT from 6/14/2021.    History and physical exam exhibit continued safe and appropriate use of controlled substances.      EMR Dragon/Transcription disclaimer:   Much of this encounter note is an electronic transcription/translation of spoken language to printed text. The electronic translation of spoken language may permit erroneous, or at times, nonsensical words or phrases to be inadvertently transcribed; Although I have reviewed the note for such errors, some may still exist.

## 2021-06-29 ENCOUNTER — TELEPHONE (OUTPATIENT)
Dept: PAIN MEDICINE | Facility: CLINIC | Age: 66
End: 2021-06-29

## 2021-06-29 NOTE — TELEPHONE ENCOUNTER
Caller: IRMA LANDON     Relationship to patient: SELF    Best call back number:     Patient is needing: SHE HAD TO MISS APPT ON 6/24 WITH ILLNESS.  SHE WANTED TO RESCHEDULE.  UNABLE TO WARM TRANSFER.  PLEASE CONTACT PATIENT

## 2021-07-01 ENCOUNTER — HOSPITAL ENCOUNTER (OUTPATIENT)
Dept: PAIN MEDICINE | Facility: HOSPITAL | Age: 66
Discharge: HOME | End: 2021-07-01

## 2021-07-01 VITALS
SYSTOLIC BLOOD PRESSURE: 122 MMHG | HEIGHT: 67 IN | TEMPERATURE: 96.9 F | OXYGEN SATURATION: 94 % | RESPIRATION RATE: 16 BRPM | WEIGHT: 197 LBS | DIASTOLIC BLOOD PRESSURE: 82 MMHG | BODY MASS INDEX: 30.92 KG/M2 | HEART RATE: 72 BPM

## 2021-07-01 DIAGNOSIS — M47.817 LUMBOSACRAL SPONDYLOSIS WITHOUT MYELOPATHY: Primary | ICD-10-CM

## 2021-07-01 DIAGNOSIS — R52 PAIN: ICD-10-CM

## 2021-07-01 PROCEDURE — 77003 FLUOROGUIDE FOR SPINE INJECT: CPT

## 2021-07-01 PROCEDURE — 64494 INJ PARAVERT F JNT L/S 2 LEV: CPT | Performed by: STUDENT IN AN ORGANIZED HEALTH CARE EDUCATION/TRAINING PROGRAM

## 2021-07-01 PROCEDURE — 25010000002 METHYLPREDNISOLONE PER 40 MG: Performed by: STUDENT IN AN ORGANIZED HEALTH CARE EDUCATION/TRAINING PROGRAM

## 2021-07-01 PROCEDURE — 64493 INJ PARAVERT F JNT L/S 1 LEV: CPT | Performed by: STUDENT IN AN ORGANIZED HEALTH CARE EDUCATION/TRAINING PROGRAM

## 2021-07-01 RX ORDER — BUPIVACAINE HYDROCHLORIDE 5 MG/ML
10 INJECTION, SOLUTION EPIDURAL; INTRACAUDAL ONCE
Status: COMPLETED | OUTPATIENT
Start: 2021-07-01 | End: 2021-07-01

## 2021-07-01 RX ORDER — METHYLPREDNISOLONE ACETATE 40 MG/ML
40 INJECTION, SUSPENSION INTRA-ARTICULAR; INTRALESIONAL; INTRAMUSCULAR; SOFT TISSUE ONCE
Status: COMPLETED | OUTPATIENT
Start: 2021-07-01 | End: 2021-07-01

## 2021-07-01 RX ADMIN — BUPIVACAINE HYDROCHLORIDE 10 ML: 5 INJECTION, SOLUTION EPIDURAL; INTRACAUDAL; PERINEURAL at 13:43

## 2021-07-01 RX ADMIN — METHYLPREDNISOLONE ACETATE 40 MG: 40 INJECTION, SUSPENSION INTRA-ARTICULAR; INTRALESIONAL; INTRAMUSCULAR; SOFT TISSUE at 13:43

## 2021-07-01 NOTE — PROCEDURES
Bilateral L3-5 Lumbar Medial Branch Blockade  [facility]      PREOPERATIVE DIAGNOSIS:  Lumbar spondylosis without myelopathy    POSTOPERATIVE DIAGNOSIS:  Lumbar spondylosis without myelopathy    PROCEDURE:   Diagnostic Bilateral Lumbar Medial Branch Nerve Blockades, with fluoroscopy:  L3, L4, and L5 nerves (at the L4 & L5 transverse processes and the sacral alar groove) to block facet joints L4-5, and L5-S1  1. 74135-45 -- Bilateral Lumbar Facet blocks, 1st Level  2. [id number] -- Bilateral Lumbar Facet blocks, 2nd  Level    PRE-PROCEDURE DISCUSSION WITH PATIENT:    Risks and complications were discussed with the patient prior to starting the procedure and informed consent was obtained.      SURGEON:  Hakan Bolivar MD    REASON FOR PROCEDURE:    The patient complains of pain that seems to have a significant axial component, Increased back pain on range of motion exams, Pain on extension of the lumbar spine and Positive lumbar facet loading maneuver    SEDATION:  Patient declined administration of moderate sedation    ANESTHETIC:  Marcaine 0.25%  STEROID:  Methylprednisolone (DEPO MEDROL) 40mg/ml  TOTAL VOLUME OF SOLUTION:  6ml    DESCRIPTON OF PROCEDURE:  After obtaining informed consent, IV access was not obtained in the preoperative area.   The patient was taken to the operating room.  The patient was placed in the prone position with a pillow under the abdomen. All pressure points were well padded.. The lumbosacral area was prepped with Chloraprep and draped in a sterile fashion. Under fluoroscopic guidance the transverse processes of the L4 and L5 vertebrae at the junctions of the superior articular processes were identified on the right. Also identified was the groove between the ala and the superior articular process of the sacrum on the ipsilateral side.  Skin and subcutaneous tissue were anesthetized with 1% lidocaine above each of these points. A 25-gauge spinal needle was introduced under  fluoroscopic guidance at the above junctions. Aspiration was negative for blood and CSF.  After confirming the position of the needle with fluoroscope in all views, a total of 1 mL of the anesthetic solution noted above was injected at each of these points.  Needles were removed intact from each of the areas.  A similar procedure was repeated to block the L3, L4, and L5 nerves on the contralateral side.   Onset of analgesia was noted.  Vital signs remained stable throughout.      ESTIMATED BLOOD LOSS:  <5 mL  SPECIMENS:  none    COMPLICATIONS:   No complications were noted.    TOLERANCE & DISCHARGE CONDITION:    The patient tolerated the procedure well.  The patient was transported to the recovery area without difficulties.  The patient was discharged to home under the care of family in stable and satisfactory condition.    PLAN OF CARE:  1. The patient was given our standard instruction sheet.  2. We discussed that Lumbar Medial Branch Blockade is a diagnostic procedure in consideration for radiofrequency ablation if two diagnostic procedures prove to be positive for significant benefit.  If sustained relief of 6 to eight weeks is obtained, then an alternative plan could be therapeutic lumbar branch blockades.  3. The patient is asked to keep a pain log each hour for 8 hours after the procedure today.  4. The patient will  Return to clinic 2-3 wks.  5. The patient will resume all medications as per the medication reconciliation sheet.

## 2021-07-29 ENCOUNTER — OFFICE VISIT (OUTPATIENT)
Dept: PAIN MEDICINE | Facility: CLINIC | Age: 66
End: 2021-07-29

## 2021-07-29 VITALS
HEIGHT: 67 IN | WEIGHT: 197 LBS | DIASTOLIC BLOOD PRESSURE: 80 MMHG | HEART RATE: 78 BPM | SYSTOLIC BLOOD PRESSURE: 137 MMHG | BODY MASS INDEX: 30.92 KG/M2 | RESPIRATION RATE: 16 BRPM | OXYGEN SATURATION: 95 % | TEMPERATURE: 97.1 F

## 2021-07-29 DIAGNOSIS — M25.50 ARTHRALGIA OF MULTIPLE SITES: ICD-10-CM

## 2021-07-29 PROCEDURE — G0463 HOSPITAL OUTPT CLINIC VISIT: HCPCS | Performed by: STUDENT IN AN ORGANIZED HEALTH CARE EDUCATION/TRAINING PROGRAM

## 2021-07-29 PROCEDURE — 99214 OFFICE O/P EST MOD 30 MIN: CPT | Performed by: STUDENT IN AN ORGANIZED HEALTH CARE EDUCATION/TRAINING PROGRAM

## 2021-07-29 RX ORDER — HYDROCODONE BITARTRATE AND ACETAMINOPHEN 5; 325 MG/1; MG/1
1 TABLET ORAL DAILY PRN
Qty: 30 TABLET | Refills: 0 | Status: SHIPPED | OUTPATIENT
Start: 2021-07-29 | End: 2021-09-27 | Stop reason: SDUPTHER

## 2021-07-29 NOTE — PROGRESS NOTES
CHIEF COMPLAINT  Chief Complaint   Patient presents with   • Back Pain     NORCO LAST DOSE 7/27/21.    • Shoulder Pain     RIGHT       Primary Care  Sravanthi, Sade POLK MD    Subjective   Genevieve Kuo is a 66 y.o. female  who presents for chronic radicular neck pain as well as right shoulder pain.  She states that she is had longstanding pain in the neck as she used to work in a restaurant waiting tables carry heavy trays.  She states that most recently, she was helping to lift a calf and felt her shoulder pull and since that time has had continued pain.  She is currently working with physical therapy which offers her minimal benefit.  She will occasionally take Tylenol, but is allergic to NSAIDs.  She denies any significant numbness or weakness in the hands describes it primarily as a sharp, stabbing pain into the shoulder as well as aching occasional stabbing pains in the neck.    Back Pain  Pertinent negatives include no numbness or weakness.        Location: Neck, right shoulder  Onset: The neck many years ago, the right shoulder approximately a year ago  Duration: Progressively worsening  Timing: Constant throughout the day  Quality: Sharp, stabbing pains with occasional burning sensations  Severity: Today: 3       Last Week: 5       Worst: 7  Modifying Factors: The pain is worse with any type of physical activity and moving and lifting.  Pain is slightly improved with rest and acetaminophen and TENS unit    Physical Therapy: yes    Interval Update 07/29/2021: 90% pain relief for 3 weeks following the lumbar medial branch blocks with gradual return of symptoms.  Otherwise, no issues.  Doing well with rare hydrocodone.    The following portions of the patient's history were reviewed and updated as appropriate: allergies, current medications, past family history, past medical history, past social history, past surgical history and problem list.      Current Outpatient Medications:   •  albuterol (PROVENTIL) (2.5  "MG/3ML) 0.083% nebulizer solution, Inhale As Needed., Disp: , Rfl:   •  atorvastatin (LIPITOR) 10 MG tablet, Take 1 tablet by mouth Daily., Disp: 90 tablet, Rfl: 4  •  escitalopram (LEXAPRO) 20 MG tablet, Take 1 tablet by mouth Daily., Disp: 90 tablet, Rfl: 4  •  gabapentin (NEURONTIN) 300 MG capsule, Take 1 capsule by mouth 2 (Two) Times a Day., Disp: 60 capsule, Rfl: 12  •  HYDROcodone-acetaminophen (NORCO) 5-325 MG per tablet, Take 1 tablet by mouth Daily As Needed for Severe Pain ., Disp: 30 tablet, Rfl: 0  •  levothyroxine (SYNTHROID, LEVOTHROID) 88 MCG tablet, Take 1 tablet by mouth Daily., Disp: 90 tablet, Rfl: 4  •  montelukast (SINGULAIR) 10 MG tablet, Take 1 tablet by mouth Daily., Disp: 90 tablet, Rfl: 4  •  Omega-3 Fatty Acids (FISH OIL) 1000 MG capsule capsule, Take 1 capsule by mouth 3 (Three) Times a Day., Disp: , Rfl:   •  Probiotic capsule, Take 1 capsule by mouth Daily., Disp: , Rfl:   •  tiZANidine (ZANAFLEX) 4 MG tablet, Take 1 tablet by mouth At Night As Needed for Muscle Spasms., Disp: 30 tablet, Rfl: 2    Review of Systems   Musculoskeletal: Positive for back pain, neck pain and neck stiffness.        Right shoulder pain   Neurological: Negative for weakness and numbness.       Vitals:    07/29/21 1036   BP: 137/80   Pulse: 78   Resp: 16   Temp: 97.1 °F (36.2 °C)   SpO2: 95%   Weight: 89.4 kg (197 lb)   Height: 170.2 cm (67\")   PainSc:   6       Objective   Physical Exam  Vitals and nursing note reviewed.   Constitutional:       General: She is not in acute distress.     Appearance: Normal appearance. She is well-developed. She is obese.   Neck:      Trachea: No tracheal deviation.      Comments: Cervical spine exam:  1.  Minimally tender bilateral paraspinal musculature  2.  Minimally tender bilateral right greater than left cervical facets  3.  Cervical facet loading weakly positive on the right  4.  Spurling positive bilaterally  Musculoskeletal:      Right shoulder: Tenderness present. No " swelling. Decreased range of motion. Decreased strength.      Comments: Lumbar Spine Exam:  Tender to palpation over the lumbar paraspinal musculature Yes  Limited range of motion secondary to pain Yes  Facet loading positive: bilateral  Facets tender to palpation: bilateral  Straight leg raise test positive: Negative       Neurological:      General: No focal deficit present.      Mental Status: She is alert and oriented to person, place, and time.           Assessment/Plan   Problems Addressed this Visit        Other    Arthralgia of multiple sites    Relevant Medications    HYDROcodone-acetaminophen (NORCO) 5-325 MG per tablet      Diagnoses       Codes Comments    Arthralgia of multiple sites     ICD-10-CM: M25.50  ICD-9-CM: 719.49           Plan:  1. Plan for repeat bilateral two-level lumbar medial branch blocks diagnostic with likely transition to RFA  2. Refill hydrocodone 5 mg daily as needed    --- Follow-up next available for lumbar medial branch blocks           INSPECT REPORT    As part of the patient's treatment plan, I may be prescribing controlled substances. The patient has been made aware of appropriate use of such medications, including potential risk of somnolence, limited ability to drive and/or work safely, and the potential for dependence or overdose. It has also bee made clear that these medications are for use by this patient only, without concomitant use of alcohol or other substances unless prescribed.     Patient has completed prescribing agreement detailing terms of continued prescribing of controlled substances, including monitoring ALYCE reports, urine drug screening, and pill counts if necessary. The patient is aware that inappropriate use will results in cessation of prescribing such medications.    INSPECT report has been reviewed and scanned into the patient's chart.    As the clinician, I personally reviewed the INSPECT from 7/29/2021.    History and physical exam exhibit  continued safe and appropriate use of controlled substances.      EMR Dragon/Transcription disclaimer:   Much of this encounter note is an electronic transcription/translation of spoken language to printed text. The electronic translation of spoken language may permit erroneous, or at times, nonsensical words or phrases to be inadvertently transcribed; Although I have reviewed the note for such errors, some may still exist.

## 2021-08-06 ENCOUNTER — HOSPITAL ENCOUNTER (OUTPATIENT)
Dept: PAIN MEDICINE | Facility: HOSPITAL | Age: 66
Discharge: HOME | End: 2021-08-06

## 2021-08-06 ENCOUNTER — TELEPHONE (OUTPATIENT)
Dept: PAIN MEDICINE | Facility: HOSPITAL | Age: 66
End: 2021-08-06

## 2021-08-06 VITALS
SYSTOLIC BLOOD PRESSURE: 119 MMHG | TEMPERATURE: 96.9 F | DIASTOLIC BLOOD PRESSURE: 72 MMHG | HEIGHT: 67 IN | HEART RATE: 72 BPM | OXYGEN SATURATION: 94 % | BODY MASS INDEX: 30.92 KG/M2 | WEIGHT: 197 LBS | RESPIRATION RATE: 16 BRPM

## 2021-08-06 DIAGNOSIS — R52 PAIN: ICD-10-CM

## 2021-08-06 DIAGNOSIS — M47.817 LUMBOSACRAL SPONDYLOSIS WITHOUT MYELOPATHY: Primary | ICD-10-CM

## 2021-08-06 PROCEDURE — 64494 INJ PARAVERT F JNT L/S 2 LEV: CPT | Performed by: STUDENT IN AN ORGANIZED HEALTH CARE EDUCATION/TRAINING PROGRAM

## 2021-08-06 PROCEDURE — 25010000002 METHYLPREDNISOLONE PER 40 MG: Performed by: STUDENT IN AN ORGANIZED HEALTH CARE EDUCATION/TRAINING PROGRAM

## 2021-08-06 PROCEDURE — 64493 INJ PARAVERT F JNT L/S 1 LEV: CPT | Performed by: STUDENT IN AN ORGANIZED HEALTH CARE EDUCATION/TRAINING PROGRAM

## 2021-08-06 PROCEDURE — 77003 FLUOROGUIDE FOR SPINE INJECT: CPT

## 2021-08-06 RX ORDER — METHYLPREDNISOLONE ACETATE 40 MG/ML
40 INJECTION, SUSPENSION INTRA-ARTICULAR; INTRALESIONAL; INTRAMUSCULAR; SOFT TISSUE ONCE
Status: COMPLETED | OUTPATIENT
Start: 2021-08-06 | End: 2021-08-06

## 2021-08-06 RX ORDER — BUPIVACAINE HYDROCHLORIDE 5 MG/ML
10 INJECTION, SOLUTION EPIDURAL; INTRACAUDAL ONCE
Status: COMPLETED | OUTPATIENT
Start: 2021-08-06 | End: 2021-08-06

## 2021-08-06 RX ADMIN — METHYLPREDNISOLONE ACETATE 40 MG: 40 INJECTION, SUSPENSION INTRA-ARTICULAR; INTRALESIONAL; INTRAMUSCULAR; SOFT TISSUE at 09:07

## 2021-08-06 RX ADMIN — BUPIVACAINE HYDROCHLORIDE 10 ML: 5 INJECTION, SOLUTION EPIDURAL; INTRACAUDAL; PERINEURAL at 09:07

## 2021-08-06 NOTE — PROCEDURES
Bilateral L3-5 Lumbar Medial Branch Blockade #2  [facility]      PREOPERATIVE DIAGNOSIS:  Lumbar spondylosis without myelopathy    POSTOPERATIVE DIAGNOSIS:  Lumbar spondylosis without myelopathy    PROCEDURE:   Diagnostic Bilateral Lumbar Medial Branch Nerve Blockades, with fluoroscopy:  L3, L4, and L5 nerves (at the L4 & L5 transverse processes and the sacral alar groove) to block facet joints L4-5, and L5-S1  1. [id number] -- Bilateral Lumbar Facet blocks, 1st Level  2. [id number] -- Bilateral Lumbar Facet blocks, 2nd  Level    PRE-PROCEDURE DISCUSSION WITH PATIENT:    Risks and complications were discussed with the patient prior to starting the procedure and informed consent was obtained.      SURGEON:  Hakan Bolivar MD    REASON FOR PROCEDURE:    The patient complains of pain that seems to have a significant axial component, Increased back pain on range of motion exams, Pain on extension of the lumbar spine and Positive lumbar facet loading maneuver    SEDATION:  Patient declined administration of moderate sedation    ANESTHETIC:  Marcaine 0.25%  STEROID:  Methylprednisolone (DEPO MEDROL) 40mg/ml  TOTAL VOLUME OF SOLUTION:  6ml    DESCRIPTON OF PROCEDURE:  After obtaining informed consent, IV access was not obtained in the preoperative area.   The patient was taken to the operating room.  The patient was placed in the prone position with a pillow under the abdomen. All pressure points were well padded.. The lumbosacral area was prepped with Chloraprep and draped in a sterile fashion. Under fluoroscopic guidance the transverse processes of the L4 and L5 vertebrae at the junctions of the superior articular processes were identified on the right. Also identified was the groove between the ala and the superior articular process of the sacrum on the ipsilateral side.  Skin and subcutaneous tissue were anesthetized with 1% lidocaine above each of these points. A 25-gauge spinal needle was introduced under  fluoroscopic guidance at the above junctions. Aspiration was negative for blood and CSF.  After confirming the position of the needle with fluoroscope in all views, a total of 1 mL of the anesthetic solution noted above was injected at each of these points.  Needles were removed intact from each of the areas.  A similar procedure was repeated to block the L3, L4, and L5 nerves on the contralateral side.   Onset of analgesia was noted.  Vital signs remained stable throughout.      ESTIMATED BLOOD LOSS:  <5 mL  SPECIMENS:  none    COMPLICATIONS:   No complications were noted.    TOLERANCE & DISCHARGE CONDITION:    The patient tolerated the procedure well.  The patient was transported to the recovery area without difficulties.  The patient was discharged to home under the care of family in stable and satisfactory condition.    PLAN OF CARE:  1. The patient was given our standard instruction sheet.  2. She again reports 90% pain relief after the injection.    3. The patient is asked to keep a pain log each hour for 8 hours after the procedure today.  4. The patient will  Plan for radiofrequency ablation bilateral 2 levels with sedation .  5. The patient will resume all medications as per the medication reconciliation sheet.

## 2021-08-06 NOTE — TELEPHONE ENCOUNTER
DR MCCURDY WOULD LIKE TO SCHEDULE PATIENT FOR B/L 2 LEVEL LUMBAR RFA WITH SEDATION . PLEASE CALL PATIENT TO SCHEDULE. THANK YOU

## 2021-08-18 ENCOUNTER — HOSPITAL ENCOUNTER (OUTPATIENT)
Dept: PAIN MEDICINE | Facility: HOSPITAL | Age: 66
Discharge: HOME | End: 2021-08-18

## 2021-08-18 VITALS
TEMPERATURE: 97.1 F | SYSTOLIC BLOOD PRESSURE: 129 MMHG | BODY MASS INDEX: 30.92 KG/M2 | DIASTOLIC BLOOD PRESSURE: 66 MMHG | HEIGHT: 67 IN | WEIGHT: 197 LBS | HEART RATE: 61 BPM | OXYGEN SATURATION: 93 % | RESPIRATION RATE: 14 BRPM

## 2021-08-18 DIAGNOSIS — R52 PAIN: ICD-10-CM

## 2021-08-18 DIAGNOSIS — M47.817 LUMBOSACRAL SPONDYLOSIS WITHOUT MYELOPATHY: Primary | ICD-10-CM

## 2021-08-18 PROCEDURE — 25010000002 MIDAZOLAM PER 1 MG

## 2021-08-18 PROCEDURE — 64635 DESTROY LUMB/SAC FACET JNT: CPT | Performed by: STUDENT IN AN ORGANIZED HEALTH CARE EDUCATION/TRAINING PROGRAM

## 2021-08-18 PROCEDURE — 64636 DESTROY L/S FACET JNT ADDL: CPT | Performed by: STUDENT IN AN ORGANIZED HEALTH CARE EDUCATION/TRAINING PROGRAM

## 2021-08-18 PROCEDURE — 25010000002 METHYLPREDNISOLONE PER 40 MG: Performed by: STUDENT IN AN ORGANIZED HEALTH CARE EDUCATION/TRAINING PROGRAM

## 2021-08-18 PROCEDURE — 25010000002 FENTANYL CITRATE (PF) 50 MCG/ML SOLUTION

## 2021-08-18 PROCEDURE — 77003 FLUOROGUIDE FOR SPINE INJECT: CPT

## 2021-08-18 RX ORDER — MIDAZOLAM HYDROCHLORIDE 1 MG/ML
INJECTION INTRAMUSCULAR; INTRAVENOUS
Status: COMPLETED
Start: 2021-08-18 | End: 2021-08-18

## 2021-08-18 RX ORDER — MIDAZOLAM HYDROCHLORIDE 1 MG/ML
2 INJECTION INTRAMUSCULAR; INTRAVENOUS ONCE
Status: COMPLETED | OUTPATIENT
Start: 2021-08-18 | End: 2021-08-18

## 2021-08-18 RX ORDER — FENTANYL CITRATE 50 UG/ML
100 INJECTION, SOLUTION INTRAMUSCULAR; INTRAVENOUS
Status: DISCONTINUED | OUTPATIENT
Start: 2021-08-18 | End: 2021-08-19 | Stop reason: HOSPADM

## 2021-08-18 RX ORDER — FENTANYL CITRATE 50 UG/ML
INJECTION, SOLUTION INTRAMUSCULAR; INTRAVENOUS
Status: COMPLETED
Start: 2021-08-18 | End: 2021-08-18

## 2021-08-18 RX ORDER — METHYLPREDNISOLONE ACETATE 40 MG/ML
40 INJECTION, SUSPENSION INTRA-ARTICULAR; INTRALESIONAL; INTRAMUSCULAR; SOFT TISSUE ONCE
Status: COMPLETED | OUTPATIENT
Start: 2021-08-18 | End: 2021-08-18

## 2021-08-18 RX ORDER — LIDOCAINE HYDROCHLORIDE 20 MG/ML
5 INJECTION, SOLUTION EPIDURAL; INFILTRATION; INTRACAUDAL; PERINEURAL ONCE
Status: COMPLETED | OUTPATIENT
Start: 2021-08-18 | End: 2021-08-18

## 2021-08-18 RX ADMIN — LIDOCAINE HYDROCHLORIDE 5 ML: 20 INJECTION, SOLUTION EPIDURAL; INFILTRATION; INTRACAUDAL; PERINEURAL at 09:32

## 2021-08-18 RX ADMIN — MIDAZOLAM HYDROCHLORIDE 1 MG: 1 INJECTION, SOLUTION INTRAMUSCULAR; INTRAVENOUS at 09:21

## 2021-08-18 RX ADMIN — METHYLPREDNISOLONE ACETATE 40 MG: 40 INJECTION, SUSPENSION INTRA-ARTICULAR; INTRALESIONAL; INTRAMUSCULAR; INTRASYNOVIAL; SOFT TISSUE at 09:36

## 2021-08-18 RX ADMIN — FENTANYL CITRATE 50 MCG: 50 INJECTION, SOLUTION INTRAMUSCULAR; INTRAVENOUS at 09:22

## 2021-08-18 RX ADMIN — MIDAZOLAM HYDROCHLORIDE 1 MG: 1 INJECTION INTRAMUSCULAR; INTRAVENOUS at 09:21

## 2021-08-18 NOTE — DISCHARGE INSTRUCTIONS
Radiofrequency Lesioning, Care After  This sheet gives you information about how to care for yourself after your procedure. Your health care provider may also give you more specific instructions. If you have problems or questions, contact your health care provider.  What can I expect after the procedure?  After the procedure, it is common to have:  · Slight pain in the area where the procedure was done.  · Temporary numbness.  Follow these instructions at home:    Medicines  · Take over-the-counter and prescription medicines only as told by your health care provider.  · Do not drive for 24 hours if you were given a sedative during your procedure.  · Do not drive or use heavy machinery while taking prescription pain medicine.  Insertion site care    · Remove the bandage (dressing) after 24 hours or as directed by your health care provider.  · Check your needle insertion site every day for signs of infection. Watch for:  ? Redness, swelling, or pain.  ? Fluid or blood.  ? Warmth.  ? Pus or a bad smell.  Managing pain    · If directed, put ice on the painful area.  ? Put ice in a plastic bag.  ? Place a towel between your skin and the bag.  ? Leave the ice on for 20 minutes, 2-3 times a day.  General instructions  · Return to your normal activities as told by your health care provider. Ask your health care provider what activities are safe for you.  · Pay close attention to how you feel after the procedure. If you start to have pain, write down when it hurts and how it feels. This will help you and your health care provider know if you need an additional treatment.  · Keep all follow-up visits as told by your health care provider. This is important.  Contact a health care provider if you have:  · Pain that does not get better.  · Redness, warmth, swelling, or pain at the needle insertion site.  · Fluid, blood, or pus coming from the needle insertion site.  · A fever.  Get help right away if you develop:  · Sudden,  severe pain.  · Numbness or tingling near the insertion site and those symptoms do not go away.  Summary  · After the procedure, it is common to have slight pain and temporary numbness in the area where the procedure was done.  · Do not drive for 24 hours if you were given a sedative during your procedure.  · Pay close attention to how you feel after the procedure. If you start to have pain, write down when it hurts and how it feels. This will help you and your health care provider know if you need an additional treatment.  · Contact a health care provider if you have a fever or pain that does not get better, or if you notice redness, warmth, swelling, pain, fluid, blood, or pus at the insertion site.  · Get help right away if you develop sudden, severe pain, or numbness or tingling near the insertion site.  This information is not intended to replace advice given to you by your health care provider. Make sure you discuss any questions you have with your health care provider.  Document Revised: 09/05/2019 Document Reviewed: 09/05/2019  [employer] Patient Education © 2021 [employer]    Moderate Conscious Sedation, Adult, Care After  This sheet gives you information about how to care for yourself after your procedure. Your health care provider may also give you more specific instructions. If you have problems or questions, contact your health care provider.  What can I expect after the procedure?  After the procedure, it is common to have:  · Sleepiness for several hours.  · Impaired judgment for several hours.  · Difficulty with balance.  · Vomiting if you eat too soon.  Follow these instructions at home:  For at least 24 hours after the procedure:         · Rest.  · Do not:  ? Participate in activities where you could fall or become injured.  ? Drive.  ? Use machinery.  ? Drink alcohol.  ? Take sleeping pills or medicines that cause drowsiness.  ? Make important decisions or sign legal documents.  ? Take care of  children on your own.  Eating and drinking    · Follow the diet recommended by your health care provider.  · Drink enough fluid to keep your urine pale yellow.  · If you vomit:  ? Drink water, juice, or soup when you can drink without vomiting.  ? Make sure you have little or no nausea before eating solid foods.  General instructions  · Have a responsible adult stay with you until you are awake and alert.  · Take over-the-counter and prescription medicines only as told by your health care provider.  · Do not smoke.  · Keep all follow-up visits as told by your health care provider. This is important.  Contact a health care provider if:  · You are still sleepy or having trouble with balance after 24 hours.  · You feel light-headed.  · You keep feeling nauseous or you keep vomiting.  · You develop a rash.  · You have a fever.  · You have redness or swelling around the IV site.  Get help right away if:  · You have trouble breathing.  · You have new-onset confusion at home.  Summary  · After the procedure, it is common to feel sleepy, have impaired judgment, or feel nauseous if you eat too soon.  · Rest after you get home. Know the things you should not do for at least 24 hours after the procedure.  · Follow the diet recommended by your health care provider and drink enough fluid to keep your urine pale yellow.  · Get help right away if you have trouble breathing or new-onset confusion at home.  This information is not intended to replace advice given to you by your health care provider. Make sure you discuss any questions you have with your health care provider.  Document Revised: 11/12/2020 Document Reviewed: 11/12/2020  [employer] Patient Education © 2021 [employer]

## 2021-08-18 NOTE — PROCEDURES
RFA UNILATERAL - L3-5  - Right  [facility]    PREOPERATIVE DIAGNOSIS:  Lumbar spondylosis without myelopathy   POSTOPERATIVE DIAGNOSIS:  Lumbar spondylosis without myelopathy     PROCEDURES PERFORMED:   Right  lumbar radiofrequency ablation of the medial branches L3, L4 and L5 at the transverse processes of L4 & L5, and the sacral alar groove to thermally treat these 2 facet joints.  1.  43168 - Lumbar radiofrequency ablation 1st level.  2.  93875 - Lumbar radiofrequency ablation 2nd level.     INFORMED CONSENT:  In preprocedure discussion with the patient, the risks and complications were discussed prior to starting the procedure and informed consent was obtained.     SURGEON:  Hakan Bolivar MD    INDICATIONS:  The patient presents with chronic lower back pain. The patient underwent 2 Right-sided lumbar medial branch blocks with diagnostically positive relief. Given the patient’s significant pain relief, it is diagnostic that we have likely found the source of the patient’s pain; therefore, lumbar radiofrequency ablation has been indicated.     SEDATION:  Versed 1mg & Fentanyl 50 mcg IV.    ANESTHETIC:  Lidocaine 1% for skin infiltration, 2% lidocaine for injection.    STEROID:  Depo 40mg Post-ablation.    DESCRIPTION OF PROCEDURE:  After obtaining informed consent an IV was  started in the preoperative area. The patient was taken to the operating room. The patient was placed in prone position with a pillow under the abdomen. All pressure points were padded. EKG, blood pressure, and pulse oximetry were monitored. The patient was  sedated. The lumbosacral area was prepped with ChloraPrep and draped in a sterile fashion.     Under fluoroscopic guidance in an oblique dimension aiming medially to above mentioned side, the transverse processes of the L4 and L5 vertebrae were identified at their junctions with the superior articular processes.  Also identified was the junction of the sacral alar groove with  the superior articular process of the sacrum in the same fluoroscopic dimension. The skin overlying these starting points was anesthetized with 1 mL of 1% lidocaine and then the radiofrequency probe needles were advanced down in the oblique view down to these junctions. With the needle tip positioned they were confirmed by AP and oblique views and, after checking for negative aspiration, there was a lack of radicular stimulation up to 3 V and 2 Hz, therefore giving the appropriate sensory testing and negative motor testing.  Then 1 mL of 2% lidocaine was instilled in each needle.  Two minutes was allowed to elapse, and during this time a lateral fluoroscopic view was taken to confirm that the needles had not advanced nor retracted.  Then radiofrequency lesioning was performed at 80° for 1.5 minutes, the needle tips were rotated 90 degrees, and a repeat lesion was performed for 60 seconds. The needles were removed intact.     ESTIMATED BLOOD LOSS:  Minimal.    SPECIMENS:  None.    COMPLICATIONS:  None.    TOLERANCE AND DISCHARGE:  The patient tolerated the procedure well. The patient was transported to the recovery area without difficulties. The patient was discharged home under the care of family in stable and satisfactory condition.    PLAN:  1.  The patient was given our standard instruction sheet.  2.  The patient will resume all medications per the medication reconciliation sheet.  3.  Return for left-sided lesioning next week.

## 2021-08-19 ENCOUNTER — TELEPHONE (OUTPATIENT)
Dept: PAIN MEDICINE | Facility: HOSPITAL | Age: 66
End: 2021-08-19

## 2021-08-19 NOTE — TELEPHONE ENCOUNTER
"Post procedure phone call completed.  Pt states she is doing \"wonderful\" and denies pain. Questions asked and answered.  "

## 2021-08-25 ENCOUNTER — HOSPITAL ENCOUNTER (OUTPATIENT)
Dept: PAIN MEDICINE | Facility: HOSPITAL | Age: 66
Discharge: HOME | End: 2021-08-25

## 2021-08-25 VITALS
DIASTOLIC BLOOD PRESSURE: 59 MMHG | SYSTOLIC BLOOD PRESSURE: 112 MMHG | BODY MASS INDEX: 30.92 KG/M2 | RESPIRATION RATE: 14 BRPM | HEART RATE: 61 BPM | HEIGHT: 67 IN | TEMPERATURE: 97.1 F | OXYGEN SATURATION: 95 % | WEIGHT: 197 LBS

## 2021-08-25 DIAGNOSIS — R52 PAIN: ICD-10-CM

## 2021-08-25 DIAGNOSIS — M47.817 LUMBOSACRAL SPONDYLOSIS WITHOUT MYELOPATHY: Primary | ICD-10-CM

## 2021-08-25 PROCEDURE — 25010000002 METHYLPREDNISOLONE PER 40 MG: Performed by: STUDENT IN AN ORGANIZED HEALTH CARE EDUCATION/TRAINING PROGRAM

## 2021-08-25 PROCEDURE — 25010000002 MIDAZOLAM PER 1 MG

## 2021-08-25 PROCEDURE — 77003 FLUOROGUIDE FOR SPINE INJECT: CPT

## 2021-08-25 PROCEDURE — 64635 DESTROY LUMB/SAC FACET JNT: CPT | Performed by: STUDENT IN AN ORGANIZED HEALTH CARE EDUCATION/TRAINING PROGRAM

## 2021-08-25 PROCEDURE — 25010000002 FENTANYL CITRATE (PF) 50 MCG/ML SOLUTION

## 2021-08-25 PROCEDURE — 64636 DESTROY L/S FACET JNT ADDL: CPT | Performed by: STUDENT IN AN ORGANIZED HEALTH CARE EDUCATION/TRAINING PROGRAM

## 2021-08-25 RX ORDER — MIDAZOLAM HYDROCHLORIDE 1 MG/ML
INJECTION INTRAMUSCULAR; INTRAVENOUS
Status: COMPLETED
Start: 2021-08-25 | End: 2021-08-25

## 2021-08-25 RX ORDER — FENTANYL CITRATE 50 UG/ML
INJECTION, SOLUTION INTRAMUSCULAR; INTRAVENOUS
Status: COMPLETED
Start: 2021-08-25 | End: 2021-08-25

## 2021-08-25 RX ORDER — LIDOCAINE HYDROCHLORIDE 20 MG/ML
5 INJECTION, SOLUTION EPIDURAL; INFILTRATION; INTRACAUDAL; PERINEURAL ONCE
Status: COMPLETED | OUTPATIENT
Start: 2021-08-25 | End: 2021-08-25

## 2021-08-25 RX ORDER — METHYLPREDNISOLONE ACETATE 40 MG/ML
40 INJECTION, SUSPENSION INTRA-ARTICULAR; INTRALESIONAL; INTRAMUSCULAR; SOFT TISSUE ONCE
Status: COMPLETED | OUTPATIENT
Start: 2021-08-25 | End: 2021-08-25

## 2021-08-25 RX ORDER — MIDAZOLAM HYDROCHLORIDE 1 MG/ML
2 INJECTION INTRAMUSCULAR; INTRAVENOUS ONCE
Status: COMPLETED | OUTPATIENT
Start: 2021-08-25 | End: 2021-08-25

## 2021-08-25 RX ORDER — FENTANYL CITRATE 50 UG/ML
100 INJECTION, SOLUTION INTRAMUSCULAR; INTRAVENOUS
Status: DISCONTINUED | OUTPATIENT
Start: 2021-08-25 | End: 2021-08-26 | Stop reason: HOSPADM

## 2021-08-25 RX ADMIN — METHYLPREDNISOLONE ACETATE 40 MG: 40 INJECTION, SUSPENSION INTRA-ARTICULAR; INTRALESIONAL; INTRAMUSCULAR; INTRASYNOVIAL; SOFT TISSUE at 09:23

## 2021-08-25 RX ADMIN — MIDAZOLAM HYDROCHLORIDE 1 MG: 1 INJECTION, SOLUTION INTRAMUSCULAR; INTRAVENOUS at 09:08

## 2021-08-25 RX ADMIN — FENTANYL CITRATE 50 MCG: 50 INJECTION, SOLUTION INTRAMUSCULAR; INTRAVENOUS at 09:08

## 2021-08-25 RX ADMIN — LIDOCAINE HYDROCHLORIDE 5 ML: 20 INJECTION, SOLUTION EPIDURAL; INFILTRATION; INTRACAUDAL; PERINEURAL at 09:22

## 2021-08-25 RX ADMIN — MIDAZOLAM HYDROCHLORIDE 1 MG: 1 INJECTION INTRAMUSCULAR; INTRAVENOUS at 09:08

## 2021-08-25 NOTE — ADDENDUM NOTE
Encounter addended by: Maryellen Isidro RN on: 8/25/2021 10:10 AM   Actions taken: LDA removed, Flowsheet accepted

## 2021-08-25 NOTE — DISCHARGE INSTRUCTIONS
Radiofrequency Lesioning, Care After  This sheet gives you information about how to care for yourself after your procedure. Your health care provider may also give you more specific instructions. If you have problems or questions, contact your health care provider.  What can I expect after the procedure?  After the procedure, it is common to have:  · Slight pain in the area where the procedure was done.  · Temporary numbness.  Follow these instructions at home:    Medicines  · Take over-the-counter and prescription medicines only as told by your health care provider.  · Do not drive for 24 hours if you were given a sedative during your procedure.  · Do not drive or use heavy machinery while taking prescription pain medicine.  Insertion site care    · Remove the bandage (dressing) after 24 hours or as directed by your health care provider.  · Check your needle insertion site every day for signs of infection. Watch for:  ? Redness, swelling, or pain.  ? Fluid or blood.  ? Warmth.  ? Pus or a bad smell.  Managing pain    · If directed, put ice on the painful area.  ? Put ice in a plastic bag.  ? Place a towel between your skin and the bag.  ? Leave the ice on for 20 minutes, 2-3 times a day.  General instructions  · Return to your normal activities as told by your health care provider. Ask your health care provider what activities are safe for you.  · Pay close attention to how you feel after the procedure. If you start to have pain, write down when it hurts and how it feels. This will help you and your health care provider know if you need an additional treatment.  · Keep all follow-up visits as told by your health care provider. This is important.  Contact a health care provider if you have:  · Pain that does not get better.  · Redness, warmth, swelling, or pain at the needle insertion site.  · Fluid, blood, or pus coming from the needle insertion site.  · A fever.  Get help r  Moderate Conscious Sedation, Adult,  Care After  This sheet gives you information about how to care for yourself after your procedure. Your health care provider may also give you more specific instructions. If you have problems or questions, contact your health care provider.  What can I expect after the procedure?  After the procedure, it is common to have:  · Sleepiness for several hours.  · Impaired judgment for several hours.  · Difficulty with balance.  · Vomiting if you eat too soon.  Follow these instructions at home:  For at least 24 hours after the procedure:         · Rest.  · Do not:  ? Participate in activities where you could fall or become injured.  ? Drive.  ? Use machinery.  ? Drink alcohol.  ? Take sleeping pills or medicines that cause drowsiness.  ? Make important decisions or sign legal documents.  ? Take care of children on your own.  Eating and drinking    · Follow the diet recommended by your health care provider.  · Drink enough fluid to keep your urine pale yellow.  · If you vomit:  ? Drink water, juice, or soup when you can drink without vomiting.  ? Make sure you have little or no nausea before eating solid foods.  General instructions  · Have a responsible adult stay with you until you are awake and alert.  · Take over-the-counter and prescription medicines only as told by your health care provider.  · Do not smoke.  · Keep all follow-up visits as told by your health care provider. This is important.  Contact a health care provider if:  · You are still sleepy or having trouble with balance after 24 hours.  · You feel light-headed.  · You keep feeling nauseous or you keep vomiting.  · You develop a rash.  · You have a fever.  · You have redness or swelling around the IV site.  Get help right away if:  · You have trouble breathing.  · You have new-onset confusion at home.  Summary  · After the procedure, it is common to feel sleepy, have impaired judgment, or feel nauseous if you eat too soon.  · Rest after you get home. Know  the things you should not do for at least 24 hours after the procedure.  · Follow the diet recommended by your health care provider and drink enough fluid to keep your urine pale yellow.  · Get help right away if you have trouble breathing or new-onset confusion at home.  This information is not intended to replace advice given to you by your health care provider. Make sure you discuss any questions you have with your health care provider.  Document Revised: 11/12/2020 Document Reviewed: 11/12/2020  [employer] Patient Education © 2021 [employer]  ight away if you develop:  · Sudden, severe pain.  · Numbness or tingling near the insertion site and those symptoms do not go away.  Summary  · After the procedure, it is common to have slight pain and temporary numbness in the area where the procedure was done.  · Do not drive for 24 hours if you were given a sedative during your procedure.  · Pay close attention to how you feel after the procedure. If you start to have pain, write down when it hurts and how it feels. This will help you and your health care provider know if you need an additional treatment.  · Contact a health care provider if you have a fever or pain that does not get better, or if you notice redness, warmth, swelling, pain, fluid, blood, or pus at the insertion site.  · Get help right away if you develop sudden, severe pain, or numbness or tingling near the insertion site.  This information is not intended to replace advice given to you by your health care provider. Make sure you discuss any questions you have with your health care provider.  Document Revised: 09/05/2019 Document Reviewed: 09/05/2019  [employer] Patient Education © 2021 [employer]

## 2021-08-25 NOTE — PROCEDURES
RFA UNILATERAL - L3-5  - Left  [facility]    PREOPERATIVE DIAGNOSIS:  Lumbar spondylosis without myelopathy   POSTOPERATIVE DIAGNOSIS:  Lumbar spondylosis without myelopathy     PROCEDURES PERFORMED:   Left  lumbar radiofrequency ablation of the medial branches L3, L4 and L5 at the transverse processes of L4 & L5, and the sacral alar groove to thermally treat these 2 facet joints.  1.  60804 - Lumbar radiofrequency ablation 1st level.  2.  41902 - Lumbar radiofrequency ablation 2nd level.     INFORMED CONSENT:  In preprocedure discussion with the patient, the risks and complications were discussed prior to starting the procedure and informed consent was obtained.     SURGEON:  Hakan Bolivar MD    INDICATIONS:  The patient presents with chronic lower back pain. The patient underwent 2 Left-sided lumbar medial branch blocks with diagnostically positive relief. Given the patient’s significant pain relief, it is diagnostic that we have likely found the source of the patient’s pain; therefore, lumbar radiofrequency ablation has been indicated.     SEDATION:  Versed 1mg & Fentanyl 50 mcg IV.    ANESTHETIC:  Lidocaine 1% for skin infiltration, 2% lidocaine for injection.    STEROID:  Depo 40mg Post-ablation.    DESCRIPTION OF PROCEDURE:  After obtaining informed consent an IV was  started in the preoperative area. The patient was taken to the operating room. The patient was placed in prone position with a pillow under the abdomen. All pressure points were padded. EKG, blood pressure, and pulse oximetry were monitored. The patient was  sedated. The lumbosacral area was prepped with ChloraPrep and draped in a sterile fashion.     Under fluoroscopic guidance in an oblique dimension aiming medially to above mentioned side, the transverse processes of the L4 and L5 vertebrae were identified at their junctions with the superior articular processes.  Also identified was the junction of the sacral alar groove with the  superior articular process of the sacrum in the same fluoroscopic dimension. The skin overlying these starting points was anesthetized with 1 mL of 1% lidocaine and then the radiofrequency probe needles were advanced down in the oblique view down to these junctions. With the needle tip positioned they were confirmed by AP and oblique views and, after checking for negative aspiration, there was a lack of radicular stimulation up to 3 V and 2 Hz, therefore giving the appropriate sensory testing and negative motor testing.  Then 1 mL of 2% lidocaine was instilled in each needle.  Two minutes was allowed to elapse, and during this time a lateral fluoroscopic view was taken to confirm that the needles had not advanced nor retracted.  Then radiofrequency lesioning was performed at 80° for 1.5 minutes, the needle tips were rotated 90 degrees, and a repeat lesion was performed for 60 seconds. The needles were removed intact.     ESTIMATED BLOOD LOSS:  Minimal.    SPECIMENS:  None.    COMPLICATIONS:  None.    TOLERANCE AND DISCHARGE:  The patient tolerated the procedure well. The patient was transported to the recovery area without difficulties. The patient was discharged home under the care of family in stable and satisfactory condition.    PLAN:  1.  The patient was given our standard instruction sheet.  2.  The patient will resume all medications per the medication reconciliation sheet.  3.  Follow-up 1 mos.

## 2021-08-26 ENCOUNTER — TELEPHONE (OUTPATIENT)
Dept: PAIN MEDICINE | Facility: HOSPITAL | Age: 66
End: 2021-08-26

## 2021-08-26 NOTE — TELEPHONE ENCOUNTER
Post op procedure call made, spoke with patient, she reports doing great with no questions or concerns.

## 2021-09-27 ENCOUNTER — OFFICE VISIT (OUTPATIENT)
Dept: FAMILY MEDICINE CLINIC | Facility: CLINIC | Age: 66
End: 2021-09-27

## 2021-09-27 ENCOUNTER — OFFICE VISIT (OUTPATIENT)
Dept: PAIN MEDICINE | Facility: CLINIC | Age: 66
End: 2021-09-27

## 2021-09-27 VITALS
RESPIRATION RATE: 18 BRPM | HEIGHT: 67 IN | HEART RATE: 82 BPM | OXYGEN SATURATION: 96 % | DIASTOLIC BLOOD PRESSURE: 78 MMHG | TEMPERATURE: 97.7 F | BODY MASS INDEX: 31.96 KG/M2 | SYSTOLIC BLOOD PRESSURE: 128 MMHG | WEIGHT: 203.6 LBS

## 2021-09-27 VITALS
HEART RATE: 75 BPM | WEIGHT: 197 LBS | SYSTOLIC BLOOD PRESSURE: 117 MMHG | HEIGHT: 67 IN | RESPIRATION RATE: 16 BRPM | BODY MASS INDEX: 30.92 KG/M2 | OXYGEN SATURATION: 94 % | DIASTOLIC BLOOD PRESSURE: 68 MMHG

## 2021-09-27 DIAGNOSIS — M47.817 LUMBOSACRAL SPONDYLOSIS WITHOUT MYELOPATHY: Primary | ICD-10-CM

## 2021-09-27 DIAGNOSIS — Z23 NEED FOR INFLUENZA VACCINATION: ICD-10-CM

## 2021-09-27 DIAGNOSIS — R42 DIZZINESS: Primary | ICD-10-CM

## 2021-09-27 DIAGNOSIS — E66.09 CLASS 1 OBESITY DUE TO EXCESS CALORIES WITH SERIOUS COMORBIDITY AND BODY MASS INDEX (BMI) OF 30.0 TO 30.9 IN ADULT: ICD-10-CM

## 2021-09-27 DIAGNOSIS — J30.1 CHRONIC SEASONAL ALLERGIC RHINITIS DUE TO POLLEN: ICD-10-CM

## 2021-09-27 DIAGNOSIS — M25.50 ARTHRALGIA OF MULTIPLE SITES: ICD-10-CM

## 2021-09-27 DIAGNOSIS — H81.13 VERTIGO, BENIGN PAROXYSMAL, BILATERAL: ICD-10-CM

## 2021-09-27 DIAGNOSIS — H65.03 NON-RECURRENT ACUTE SEROUS OTITIS MEDIA OF BOTH EARS: ICD-10-CM

## 2021-09-27 PROBLEM — E66.811 CLASS 1 OBESITY DUE TO EXCESS CALORIES WITH SERIOUS COMORBIDITY AND BODY MASS INDEX (BMI) OF 30.0 TO 30.9 IN ADULT: Status: ACTIVE | Noted: 2021-09-27

## 2021-09-27 PROBLEM — E66.3 OVERWEIGHT WITH BODY MASS INDEX (BMI) 25.0-29.9: Status: RESOLVED | Noted: 2019-08-28 | Resolved: 2021-09-27

## 2021-09-27 PROCEDURE — 36415 COLL VENOUS BLD VENIPUNCTURE: CPT | Performed by: FAMILY MEDICINE

## 2021-09-27 PROCEDURE — 90662 IIV NO PRSV INCREASED AG IM: CPT | Performed by: FAMILY MEDICINE

## 2021-09-27 PROCEDURE — 99213 OFFICE O/P EST LOW 20 MIN: CPT | Performed by: FAMILY MEDICINE

## 2021-09-27 PROCEDURE — G0008 ADMIN INFLUENZA VIRUS VAC: HCPCS | Performed by: FAMILY MEDICINE

## 2021-09-27 PROCEDURE — 99214 OFFICE O/P EST MOD 30 MIN: CPT | Performed by: STUDENT IN AN ORGANIZED HEALTH CARE EDUCATION/TRAINING PROGRAM

## 2021-09-27 RX ORDER — MECLIZINE HYDROCHLORIDE 25 MG/1
25 TABLET ORAL 4 TIMES DAILY
Qty: 40 TABLET | Refills: 3 | Status: SHIPPED | OUTPATIENT
Start: 2021-09-27 | End: 2022-06-14 | Stop reason: SDUPTHER

## 2021-09-27 RX ORDER — HYDROCODONE BITARTRATE AND ACETAMINOPHEN 5; 325 MG/1; MG/1
1 TABLET ORAL DAILY PRN
Qty: 30 TABLET | Refills: 0 | Status: SHIPPED | OUTPATIENT
Start: 2021-09-27 | End: 2021-11-22 | Stop reason: SDUPTHER

## 2021-09-27 RX ORDER — FLUTICASONE PROPIONATE 50 MCG
2 SPRAY, SUSPENSION (ML) NASAL DAILY
Qty: 1 G | Refills: 12 | Status: SHIPPED | OUTPATIENT
Start: 2021-09-27 | End: 2021-10-05 | Stop reason: SDUPTHER

## 2021-09-27 NOTE — PROGRESS NOTES
CHIEF COMPLAINT  Chief Complaint   Patient presents with   • Back Pain     Low back pain Hydrocodone LD 9/23       Primary Care  Sravanthi, Sade POLK MD    Subjective   Genevieve Kuo is a 66 y.o. female  who presents for chronic radicular neck pain as well as right shoulder pain.  She states that she is had longstanding pain in the neck as she used to work in a restaurant waiting tables carry heavy trays.  She states that most recently, she was helping to lift a calf and felt her shoulder pull and since that time has had continued pain.  She is currently working with physical therapy which offers her minimal benefit.  She will occasionally take Tylenol, but is allergic to NSAIDs.  She denies any significant numbness or weakness in the hands describes it primarily as a sharp, stabbing pain into the shoulder as well as aching occasional stabbing pains in the neck.    Back Pain  Pertinent negatives include no numbness or weakness.        Location: Neck, right shoulder  Onset: The neck many years ago, the right shoulder approximately a year ago  Duration: Progressively worsening  Timing: Constant throughout the day  Quality: Sharp, stabbing pains with occasional burning sensations  Severity: Today: 1       Last Week: 1       Worst: 7  Modifying Factors: The pain is worse with any type of physical activity and moving and lifting.  Pain is slightly improved with rest and acetaminophen and TENS unit    Physical Therapy: yes    Interval Update 09/27/2021: Reports approximately 90% pain relief from the lumbar RFA is ongoing.  Otherwise, no issues.  Takes rare hydrocodone for breakthrough pain.    The following portions of the patient's history were reviewed and updated as appropriate: allergies, current medications, past family history, past medical history, past social history, past surgical history and problem list.      Current Outpatient Medications:   •  albuterol (PROVENTIL) (2.5 MG/3ML) 0.083% nebulizer solution, Inhale  "As Needed., Disp: , Rfl:   •  atorvastatin (LIPITOR) 10 MG tablet, Take 1 tablet by mouth Daily., Disp: 90 tablet, Rfl: 4  •  escitalopram (LEXAPRO) 20 MG tablet, Take 1 tablet by mouth Daily., Disp: 90 tablet, Rfl: 4  •  gabapentin (NEURONTIN) 300 MG capsule, Take 1 capsule by mouth 2 (Two) Times a Day., Disp: 60 capsule, Rfl: 12  •  HYDROcodone-acetaminophen (NORCO) 5-325 MG per tablet, Take 1 tablet by mouth Daily As Needed for Severe Pain ., Disp: 30 tablet, Rfl: 0  •  levothyroxine (SYNTHROID, LEVOTHROID) 88 MCG tablet, Take 1 tablet by mouth Daily., Disp: 90 tablet, Rfl: 4  •  montelukast (SINGULAIR) 10 MG tablet, Take 1 tablet by mouth Daily., Disp: 90 tablet, Rfl: 4  •  Omega-3 Fatty Acids (FISH OIL) 1000 MG capsule capsule, Take 1 capsule by mouth 3 (Three) Times a Day., Disp: , Rfl:   •  Probiotic capsule, Take 1 capsule by mouth Daily., Disp: , Rfl:   •  tiZANidine (ZANAFLEX) 4 MG tablet, Take 1 tablet by mouth At Night As Needed for Muscle Spasms., Disp: 30 tablet, Rfl: 2    Review of Systems   Musculoskeletal: Positive for back pain, neck pain and neck stiffness.        Right shoulder pain   Neurological: Negative for weakness and numbness.       Vitals:    09/27/21 0927   BP: 117/68   Pulse: 75   Resp: 16   SpO2: 94%   Weight: 89.4 kg (197 lb)   Height: 170.2 cm (67\")   PainSc:   1       Objective   Physical Exam  Vitals and nursing note reviewed.   Constitutional:       General: She is not in acute distress.     Appearance: Normal appearance. She is well-developed. She is obese.   Neck:      Trachea: No tracheal deviation.      Comments: Cervical spine exam:  1.  Minimally tender bilateral paraspinal musculature  2.  Minimally tender bilateral right greater than left cervical facets  3.  Cervical facet loading weakly positive on the right  4.  Spurling positive bilaterally  Musculoskeletal:      Right shoulder: Tenderness present. No swelling. Decreased range of motion. Decreased strength.      " Comments: Lumbar Spine Exam:  Tender to palpation over the lumbar paraspinal musculature Yes  Limited range of motion secondary to pain Yes  Facet loading positive: bilateral  Facets tender to palpation: bilateral  Straight leg raise test positive: Negative       Neurological:      General: No focal deficit present.      Mental Status: She is alert and oriented to person, place, and time.           Assessment/Plan   Problems Addressed this Visit        Other    Arthralgia of multiple sites    Relevant Medications    HYDROcodone-acetaminophen (NORCO) 5-325 MG per tablet      Other Visit Diagnoses     Lumbosacral spondylosis without myelopathy    -  Primary      Diagnoses       Codes Comments    Lumbosacral spondylosis without myelopathy    -  Primary ICD-10-CM: M47.817  ICD-9-CM: 721.3     Arthralgia of multiple sites     ICD-10-CM: M25.50  ICD-9-CM: 719.49           Plan:  1. Excellent relief with lumbar RFA  2. Refill rare hydrocodone 5 mg    --- Follow-up as needed           INSPECT REPORT    As part of the patient's treatment plan, I may be prescribing controlled substances. The patient has been made aware of appropriate use of such medications, including potential risk of somnolence, limited ability to drive and/or work safely, and the potential for dependence or overdose. It has also bee made clear that these medications are for use by this patient only, without concomitant use of alcohol or other substances unless prescribed.     Patient has completed prescribing agreement detailing terms of continued prescribing of controlled substances, including monitoring ALYCE reports, urine drug screening, and pill counts if necessary. The patient is aware that inappropriate use will results in cessation of prescribing such medications.    INSPECT report has been reviewed and scanned into the patient's chart.    As the clinician, I personally reviewed the INSPECT from 9/24/2021.    History and physical exam exhibit  continued safe and appropriate use of controlled substances.      EMR Dragon/Transcription disclaimer:   Much of this encounter note is an electronic transcription/translation of spoken language to printed text. The electronic translation of spoken language may permit erroneous, or at times, nonsensical words or phrases to be inadvertently transcribed; Although I have reviewed the note for such errors, some may still exist.

## 2021-09-28 LAB
ALBUMIN SERPL-MCNC: 4.4 G/DL (ref 3.8–4.8)
ALBUMIN/GLOB SERPL: 2.1 {RATIO} (ref 1.2–2.2)
ALP SERPL-CCNC: 147 IU/L (ref 44–121)
ALT SERPL-CCNC: 15 IU/L (ref 0–32)
AST SERPL-CCNC: 18 IU/L (ref 0–40)
BASOPHILS # BLD AUTO: 0 X10E3/UL (ref 0–0.2)
BASOPHILS NFR BLD AUTO: 1 %
BILIRUB SERPL-MCNC: 0.4 MG/DL (ref 0–1.2)
BUN SERPL-MCNC: 11 MG/DL (ref 8–27)
BUN/CREAT SERPL: 14 (ref 12–28)
CALCIUM SERPL-MCNC: 10.1 MG/DL (ref 8.7–10.3)
CHLORIDE SERPL-SCNC: 104 MMOL/L (ref 96–106)
CO2 SERPL-SCNC: 24 MMOL/L (ref 20–29)
CREAT SERPL-MCNC: 0.78 MG/DL (ref 0.57–1)
EOSINOPHIL # BLD AUTO: 0.2 X10E3/UL (ref 0–0.4)
EOSINOPHIL NFR BLD AUTO: 4 %
ERYTHROCYTE [DISTWIDTH] IN BLOOD BY AUTOMATED COUNT: 13.4 % (ref 11.7–15.4)
GLOBULIN SER CALC-MCNC: 2.1 G/DL (ref 1.5–4.5)
GLUCOSE SERPL-MCNC: 93 MG/DL (ref 65–99)
HCT VFR BLD AUTO: 38.9 % (ref 34–46.6)
HGB BLD-MCNC: 12.5 G/DL (ref 11.1–15.9)
IMM GRANULOCYTES # BLD AUTO: 0 X10E3/UL (ref 0–0.1)
IMM GRANULOCYTES NFR BLD AUTO: 0 %
LYMPHOCYTES # BLD AUTO: 2 X10E3/UL (ref 0.7–3.1)
LYMPHOCYTES NFR BLD AUTO: 28 %
MCH RBC QN AUTO: 29.3 PG (ref 26.6–33)
MCHC RBC AUTO-ENTMCNC: 32.1 G/DL (ref 31.5–35.7)
MCV RBC AUTO: 91 FL (ref 79–97)
MONOCYTES # BLD AUTO: 0.5 X10E3/UL (ref 0.1–0.9)
MONOCYTES NFR BLD AUTO: 7 %
NEUTROPHILS # BLD AUTO: 4.2 X10E3/UL (ref 1.4–7)
NEUTROPHILS NFR BLD AUTO: 60 %
PLATELET # BLD AUTO: 311 X10E3/UL (ref 150–450)
POTASSIUM SERPL-SCNC: 4.6 MMOL/L (ref 3.5–5.2)
PROT SERPL-MCNC: 6.5 G/DL (ref 6–8.5)
RBC # BLD AUTO: 4.26 X10E6/UL (ref 3.77–5.28)
SODIUM SERPL-SCNC: 142 MMOL/L (ref 134–144)
WBC # BLD AUTO: 6.9 X10E3/UL (ref 3.4–10.8)

## 2021-10-05 DIAGNOSIS — J30.1 CHRONIC SEASONAL ALLERGIC RHINITIS DUE TO POLLEN: ICD-10-CM

## 2021-10-05 RX ORDER — FLUTICASONE PROPIONATE 50 MCG
2 SPRAY, SUSPENSION (ML) NASAL DAILY
Qty: 1 G | Refills: 12 | Status: SHIPPED | OUTPATIENT
Start: 2021-10-05 | End: 2021-10-08 | Stop reason: SDUPTHER

## 2021-10-08 DIAGNOSIS — J30.1 CHRONIC SEASONAL ALLERGIC RHINITIS DUE TO POLLEN: ICD-10-CM

## 2021-10-08 RX ORDER — FLUTICASONE PROPIONATE 50 MCG
2 SPRAY, SUSPENSION (ML) NASAL DAILY
Qty: 16 G | Refills: 3 | Status: SHIPPED | OUTPATIENT
Start: 2021-10-08 | End: 2022-06-14 | Stop reason: SDUPTHER

## 2021-11-22 DIAGNOSIS — M25.50 ARTHRALGIA OF MULTIPLE SITES: ICD-10-CM

## 2021-11-23 RX ORDER — HYDROCODONE BITARTRATE AND ACETAMINOPHEN 5; 325 MG/1; MG/1
1 TABLET ORAL DAILY PRN
Qty: 30 TABLET | Refills: 0 | Status: SHIPPED | OUTPATIENT
Start: 2021-11-23 | End: 2022-01-20 | Stop reason: SDUPTHER

## 2021-12-09 DIAGNOSIS — F41.9 ANXIETY: ICD-10-CM

## 2021-12-09 RX ORDER — ESCITALOPRAM OXALATE 20 MG/1
TABLET ORAL
Qty: 90 TABLET | Refills: 4 | Status: SHIPPED | OUTPATIENT
Start: 2021-12-09 | End: 2022-06-14 | Stop reason: SDUPTHER

## 2021-12-28 ENCOUNTER — OFFICE VISIT (OUTPATIENT)
Dept: FAMILY MEDICINE CLINIC | Facility: CLINIC | Age: 66
End: 2021-12-28

## 2021-12-28 VITALS
BODY MASS INDEX: 32.68 KG/M2 | HEART RATE: 84 BPM | SYSTOLIC BLOOD PRESSURE: 130 MMHG | OXYGEN SATURATION: 96 % | DIASTOLIC BLOOD PRESSURE: 78 MMHG | TEMPERATURE: 97.1 F | HEIGHT: 67 IN | WEIGHT: 208.2 LBS | RESPIRATION RATE: 18 BRPM

## 2021-12-28 DIAGNOSIS — E03.9 ACQUIRED HYPOTHYROIDISM: ICD-10-CM

## 2021-12-28 DIAGNOSIS — F41.9 ANXIETY AND DEPRESSION: ICD-10-CM

## 2021-12-28 DIAGNOSIS — R41.3 MEMORY CHANGE: Primary | ICD-10-CM

## 2021-12-28 DIAGNOSIS — E66.09 CLASS 1 OBESITY DUE TO EXCESS CALORIES WITH SERIOUS COMORBIDITY AND BODY MASS INDEX (BMI) OF 30.0 TO 30.9 IN ADULT: ICD-10-CM

## 2021-12-28 DIAGNOSIS — F32.A ANXIETY AND DEPRESSION: ICD-10-CM

## 2021-12-28 DIAGNOSIS — Z81.8 FAMILY HISTORY OF DEMENTIA: ICD-10-CM

## 2021-12-28 PROBLEM — M25.511 ACUTE PAIN OF RIGHT SHOULDER: Status: RESOLVED | Noted: 2020-09-10 | Resolved: 2021-12-28

## 2021-12-28 PROCEDURE — 99213 OFFICE O/P EST LOW 20 MIN: CPT | Performed by: FAMILY MEDICINE

## 2021-12-28 PROCEDURE — 36415 COLL VENOUS BLD VENIPUNCTURE: CPT | Performed by: FAMILY MEDICINE

## 2021-12-28 NOTE — PROGRESS NOTES
Chief Complaint  Altered Mental Status and Hypothyroidism    Subjective     CC  Problem List  Visit Diagnosis   Encounters  Notes  Medications  Labs  Result Review Imaging  Media    Genevieve Kuo presents to [facility] for   Mental Status Change:  Genevieve Kuo is here for evaluation of a mental status change. Symptoms include Memory loss, dizziness and feeling off balance.Onset was 6 month(s) ago. Progression gradually worsening. Previous treatment includes None. Mini mental evaluation was done during office visit.     Hypothyroidism  This is a chronic problem. The current episode started more than 1 year ago. Pertinent negatives include no abdominal pain, arthralgias, chest pain, fever, myalgias, nausea, numbness, vomiting or weakness. Treatments tried: Levothyroxine 88 MCG. The treatment provided moderate relief.       Review of Systems   Constitutional: Negative for appetite change, fever, unexpected weight gain and unexpected weight loss.   Eyes: Negative for visual disturbance.   Respiratory: Negative for shortness of breath.    Cardiovascular: Negative for chest pain, palpitations and leg swelling.   Gastrointestinal: Negative for abdominal pain, constipation, diarrhea, nausea and vomiting.   Endocrine: Negative for cold intolerance, heat intolerance, polydipsia and polyuria.   Genitourinary: Negative for dysuria.   Musculoskeletal: Negative for arthralgias and myalgias.   Neurological: Positive for memory problem. Negative for weakness, numbness and headache.        She and her  report that she put 2 tags on gifts for [employer], she was in [place] and could not remember how to get home.   Hematological: Negative for adenopathy. Does not bruise/bleed easily.   Psychiatric/Behavioral: Negative for suicidal ideas. The patient is nervous/anxious (moderate life stresses, a child is keeping grand children from she and her . ).         Objective   Vital Signs:  "  /78   Pulse 84   Temp 97.1 °F (36.2 °C)   Resp 18   Ht 170.2 cm (67\")   Wt 94.4 kg (208 lb 3.2 oz)   SpO2 96%   BMI 32.61 kg/m²     Physical Exam  Constitutional:       General: She is not in acute distress.     Appearance: She is well-developed. She is obese.   Eyes:      Extraocular Movements: Extraocular movements intact.      Pupils: Pupils are equal, round, and reactive to light.      Comments: Fundi benign.    Neck:      Thyroid: No thyromegaly.      Vascular: No JVD.   Cardiovascular:      Rate and Rhythm: Normal rate and regular rhythm.      Heart sounds: Normal heart sounds. No murmur heard.  No friction rub. No gallop.    Pulmonary:      Effort: Pulmonary effort is normal. No respiratory distress.      Breath sounds: Normal breath sounds. No wheezing or rales.   Musculoskeletal:      Cervical back: Neck supple.      Right lower leg: No edema.      Left lower leg: No edema.   Lymphadenopathy:      Cervical: No cervical adenopathy.   Skin:     Findings: No rash.   Neurological:      General: No focal deficit present.      Mental Status: She is alert and oriented to person, place, and time.      Sensory: No sensory deficit.      Motor: No weakness.      Coordination: Coordination normal.      Gait: Gait normal.      Deep Tendon Reflexes: Reflexes normal.        Result Review :Labs  Result Review  Imaging  Med Tab  Media                 Assessment and Plan CC Problem List  Visit Diagnosis  ROS  Review (Popup)  Health Maintenance  Quality  BestPractice  Medications  SmartSets  SnapShot Encounters  Media  Problem List Items Addressed This Visit        High    Anxiety and depression    Overview     Mild exacerbation on escitalopram continue present dose diet exercise.             Medium    Acquired hypothyroidism    Overview     TSH to r/o abnormality levels 0.5, 11/2020         Relevant Orders    TSH       Low    Class 1 obesity due to excess calories with serious comorbidity and " body mass index (BMI) of 32.0 to 32.9 in adult    Overview     Healthy diet and regular exercise and wt loss encouraged.             Unprioritized    Family history of dementia    Overview     Moter and aunt in their 80's           Other Visit Diagnoses     Memory change    -  Primary    30 if 30 on mental status exam. Neg neurologic exam, strong Fhx of Alzhemier's. Lab MRI and neurology referral. Stress is likely playing a roll.     Relevant Orders    CBC & Differential    Comprehensive Metabolic Panel    Folate    Vitamin B12    Ambulatory Referral to Neurology          Follow Up Instructions Charge Capture  Follow-up Communications  Return after neurology apt.  Patient was given instructions and counseling regarding her condition or for health maintenance advice. Please see specific information pulled into the AVS if appropriate.

## 2021-12-29 DIAGNOSIS — E03.9 ACQUIRED HYPOTHYROIDISM: Primary | ICD-10-CM

## 2021-12-29 LAB
ALBUMIN SERPL-MCNC: 4.2 G/DL (ref 3.8–4.8)
ALBUMIN/GLOB SERPL: 2 {RATIO} (ref 1.2–2.2)
ALP SERPL-CCNC: 136 IU/L (ref 44–121)
ALT SERPL-CCNC: 18 IU/L (ref 0–32)
AST SERPL-CCNC: 16 IU/L (ref 0–40)
BASOPHILS # BLD AUTO: 0.1 X10E3/UL (ref 0–0.2)
BASOPHILS NFR BLD AUTO: 1 %
BILIRUB SERPL-MCNC: 0.3 MG/DL (ref 0–1.2)
BUN SERPL-MCNC: 11 MG/DL (ref 8–27)
BUN/CREAT SERPL: 14 (ref 12–28)
CALCIUM SERPL-MCNC: 9.4 MG/DL (ref 8.7–10.3)
CHLORIDE SERPL-SCNC: 107 MMOL/L (ref 96–106)
CO2 SERPL-SCNC: 20 MMOL/L (ref 20–29)
CREAT SERPL-MCNC: 0.78 MG/DL (ref 0.57–1)
EOSINOPHIL # BLD AUTO: 0.5 X10E3/UL (ref 0–0.4)
EOSINOPHIL NFR BLD AUTO: 6 %
ERYTHROCYTE [DISTWIDTH] IN BLOOD BY AUTOMATED COUNT: 12.7 % (ref 11.7–15.4)
FOLATE SERPL-MCNC: 7 NG/ML
GLOBULIN SER CALC-MCNC: 2.1 G/DL (ref 1.5–4.5)
GLUCOSE SERPL-MCNC: 123 MG/DL (ref 65–99)
HCT VFR BLD AUTO: 40.5 % (ref 34–46.6)
HGB BLD-MCNC: 13 G/DL (ref 11.1–15.9)
IMM GRANULOCYTES # BLD AUTO: 0 X10E3/UL (ref 0–0.1)
IMM GRANULOCYTES NFR BLD AUTO: 0 %
LYMPHOCYTES # BLD AUTO: 2.3 X10E3/UL (ref 0.7–3.1)
LYMPHOCYTES NFR BLD AUTO: 29 %
MCH RBC QN AUTO: 28.6 PG (ref 26.6–33)
MCHC RBC AUTO-ENTMCNC: 32.1 G/DL (ref 31.5–35.7)
MCV RBC AUTO: 89 FL (ref 79–97)
MONOCYTES # BLD AUTO: 0.5 X10E3/UL (ref 0.1–0.9)
MONOCYTES NFR BLD AUTO: 6 %
NEUTROPHILS # BLD AUTO: 4.6 X10E3/UL (ref 1.4–7)
NEUTROPHILS NFR BLD AUTO: 58 %
PLATELET # BLD AUTO: 299 X10E3/UL (ref 150–450)
POTASSIUM SERPL-SCNC: 4.4 MMOL/L (ref 3.5–5.2)
PROT SERPL-MCNC: 6.3 G/DL (ref 6–8.5)
RBC # BLD AUTO: 4.54 X10E6/UL (ref 3.77–5.28)
SODIUM SERPL-SCNC: 145 MMOL/L (ref 134–144)
SPECIMEN STATUS: NORMAL
TSH SERPL DL<=0.005 MIU/L-ACNC: 0.27 UIU/ML (ref 0.45–4.5)
VIT B12 SERPL-MCNC: 325 PG/ML (ref 232–1245)
WBC # BLD AUTO: 8 X10E3/UL (ref 3.4–10.8)

## 2021-12-29 RX ORDER — LEVOTHYROXINE SODIUM 0.07 MG/1
75 TABLET ORAL DAILY
Qty: 60 TABLET | Refills: 0 | Status: SHIPPED | OUTPATIENT
Start: 2021-12-29 | End: 2022-03-02 | Stop reason: SDUPTHER

## 2021-12-30 LAB — HBA1C MFR BLD: 6.5 % (ref 4.8–5.6)

## 2022-01-03 DIAGNOSIS — J30.1 CHRONIC SEASONAL ALLERGIC RHINITIS DUE TO POLLEN: ICD-10-CM

## 2022-01-03 DIAGNOSIS — R41.3 MEMORY CHANGE: Primary | ICD-10-CM

## 2022-01-03 DIAGNOSIS — E78.2 MIXED HYPERLIPIDEMIA: ICD-10-CM

## 2022-01-03 RX ORDER — MONTELUKAST SODIUM 10 MG/1
TABLET ORAL
Qty: 90 TABLET | Refills: 4 | Status: SHIPPED | OUTPATIENT
Start: 2022-01-03 | End: 2022-06-14 | Stop reason: SDUPTHER

## 2022-01-03 RX ORDER — ATORVASTATIN CALCIUM 10 MG/1
TABLET, FILM COATED ORAL
Qty: 90 TABLET | Refills: 4 | Status: SHIPPED | OUTPATIENT
Start: 2022-01-03 | End: 2022-06-14 | Stop reason: SDUPTHER

## 2022-01-03 RX ORDER — GABAPENTIN 300 MG/1
CAPSULE ORAL
Qty: 60 CAPSULE | Refills: 12 | Status: SHIPPED | OUTPATIENT
Start: 2022-01-03 | End: 2022-06-14 | Stop reason: SDUPTHER

## 2022-01-18 ENCOUNTER — TELEPHONE (OUTPATIENT)
Dept: PAIN MEDICINE | Facility: CLINIC | Age: 67
End: 2022-01-18

## 2022-01-18 NOTE — TELEPHONE ENCOUNTER
Caller: PATIENT     Relationship: SELF     Best call back number: [phone]    Requested Prescriptions: HYDROCODONE 5-325 MG.        Pharmacy where request should be sent:  [facility]- [phone]      Additional details provided by patient:  PT. NEEDS REFILL ON HYDROCODONE- SHE IS OUT OF MEDICATION.   ALSO, MADE FOLLOW UP APPT. - BUT FIRST OPENING IN [place] ISN'T UNTIL 02/18/22.   PT. ASKING IF DR. MCCURDY NEEDS TO SEE HER BEFORE THEN.   PLEASE ADVISE.     Does the patient have less than a 3 day supply:  [x] Yes  [] No

## 2022-01-20 ENCOUNTER — OFFICE VISIT (OUTPATIENT)
Dept: PAIN MEDICINE | Facility: CLINIC | Age: 67
End: 2022-01-20

## 2022-01-20 VITALS
BODY MASS INDEX: 31.08 KG/M2 | HEART RATE: 78 BPM | TEMPERATURE: 97.1 F | WEIGHT: 198 LBS | RESPIRATION RATE: 16 BRPM | DIASTOLIC BLOOD PRESSURE: 77 MMHG | OXYGEN SATURATION: 95 % | HEIGHT: 67 IN | SYSTOLIC BLOOD PRESSURE: 123 MMHG

## 2022-01-20 DIAGNOSIS — M46.1 BILATERAL SACROILIITIS: Primary | ICD-10-CM

## 2022-01-20 DIAGNOSIS — M25.50 ARTHRALGIA OF MULTIPLE SITES: ICD-10-CM

## 2022-01-20 PROCEDURE — G0463 HOSPITAL OUTPT CLINIC VISIT: HCPCS | Performed by: STUDENT IN AN ORGANIZED HEALTH CARE EDUCATION/TRAINING PROGRAM

## 2022-01-20 PROCEDURE — 99214 OFFICE O/P EST MOD 30 MIN: CPT | Performed by: STUDENT IN AN ORGANIZED HEALTH CARE EDUCATION/TRAINING PROGRAM

## 2022-01-20 RX ORDER — HYDROCODONE BITARTRATE AND ACETAMINOPHEN 5; 325 MG/1; MG/1
1 TABLET ORAL DAILY PRN
Qty: 30 TABLET | Refills: 0 | Status: SHIPPED | OUTPATIENT
Start: 2022-01-20 | End: 2022-02-18 | Stop reason: SDUPTHER

## 2022-01-20 NOTE — PROGRESS NOTES
CHIEF COMPLAINT  Chief Complaint   Patient presents with   • Sciatica     LEFT GOING INTO HIP. NORCO LAST DOSE OVER A WEEK AGO.        Primary Care  Sravanthi, Sade POLK MD    Subjective   Genevieve Kuo is a 66 y.o. female  who presents for chronic radicular neck pain as well as right shoulder pain.  She states that she is had longstanding pain in the neck as she used to work in a restaurant waiting tables carry heavy trays.  She states that most recently, she was helping to lift a calf and felt her shoulder pull and since that time has had continued pain.  She is currently working with physical therapy which offers her minimal benefit.  She will occasionally take Tylenol, but is allergic to NSAIDs.  She denies any significant numbness or weakness in the hands describes it primarily as a sharp, stabbing pain into the shoulder as well as aching occasional stabbing pains in the neck.    Back Pain  Pertinent negatives include no numbness or weakness.   Sciatica  Associated symptoms include neck pain. Pertinent negatives include no numbness or weakness.        Location: Neck, right shoulder  Onset: The neck many years ago, the right shoulder approximately a year ago  Duration: Progressively worsening  Timing: Constant throughout the day  Quality: Sharp, stabbing pains with occasional burning sensations  Severity: Today: 5       Last Week: 5       Worst: 7  Modifying Factors: The pain is worse with any type of physical activity and moving and lifting.  Pain is slightly improved with rest and acetaminophen and TENS unit    Physical Therapy: yes    Interval Update 01/20/2022: She continues to do very well from her lumbar RFA.  She states that she been very active recently and her pain has been fairly well controlled.  She is complaining of some bilateral SI joint pain.  Also requesting refill of her hydrocodone.    The following portions of the patient's history were reviewed and updated as appropriate: allergies, current  "medications, past family history, past medical history, past social history, past surgical history and problem list.      Current Outpatient Medications:   •  albuterol (PROVENTIL) (2.5 MG/3ML) 0.083% nebulizer solution, Inhale As Needed., Disp: , Rfl:   •  atorvastatin (LIPITOR) 10 MG tablet, TAKE 1 TABLET BY MOUTH EVERY DAY, Disp: 90 tablet, Rfl: 4  •  escitalopram (LEXAPRO) 20 MG tablet, TAKE 1 TABLET BY MOUTH EVERY DAY, Disp: 90 tablet, Rfl: 4  •  fluticasone (FLONASE) 50 MCG/ACT nasal spray, 2 sprays into the nostril(s) as directed by provider Daily., Disp: 16 g, Rfl: 3  •  gabapentin (NEURONTIN) 300 MG capsule, TAKE 1 CAPSULE BY MOUTH TWICE A DAY, Disp: 60 capsule, Rfl: 12  •  HYDROcodone-acetaminophen (NORCO) 5-325 MG per tablet, Take 1 tablet by mouth Daily As Needed for Severe Pain ., Disp: 30 tablet, Rfl: 0  •  levothyroxine (SYNTHROID, LEVOTHROID) 75 MCG tablet, Take 1 tablet by mouth Daily., Disp: 60 tablet, Rfl: 0  •  meclizine (ANTIVERT) 25 MG tablet, Take 1 tablet by mouth 4 (Four) Times a Day., Disp: 40 tablet, Rfl: 3  •  montelukast (SINGULAIR) 10 MG tablet, TAKE 1 TABLET BY MOUTH EVERY DAY, Disp: 90 tablet, Rfl: 4  •  Omega-3 Fatty Acids (FISH OIL) 1000 MG capsule capsule, Take 1 capsule by mouth 3 (Three) Times a Day., Disp: , Rfl:   •  Probiotic capsule, Take 1 capsule by mouth Daily., Disp: , Rfl:   •  tiZANidine (ZANAFLEX) 4 MG tablet, Take 1 tablet by mouth At Night As Needed for Muscle Spasms., Disp: 30 tablet, Rfl: 2    Review of Systems   Musculoskeletal: Positive for back pain, neck pain and neck stiffness.        Right shoulder pain   Neurological: Negative for weakness and numbness.       Vitals:    01/20/22 1036   BP: 123/77   Pulse: 78   Resp: 16   Temp: 97.1 °F (36.2 °C)   SpO2: 95%   Weight: 89.8 kg (198 lb)   Height: 170.2 cm (67\")   PainSc:   5       Objective   Physical Exam  Vitals and nursing note reviewed.   Constitutional:       General: She is not in acute distress.     " Appearance: Normal appearance. She is well-developed. She is obese.   Neck:      Trachea: No tracheal deviation.      Comments: Cervical spine exam:  1.  Minimally tender bilateral paraspinal musculature  2.  Minimally tender bilateral right greater than left cervical facets  3.  Cervical facet loading weakly positive on the right  4.  Spurling positive bilaterally  Musculoskeletal:      Right shoulder: Tenderness present. No swelling. Decreased range of motion. Decreased strength.      Comments: Lumbar Spine Exam:  Tender to palpation over the lumbar paraspinal musculature Yes  Limited range of motion secondary to pain Yes  Facet loading positive: bilateral  Facets tender to palpation: bilateral  Straight leg raise test positive: Negative    SI joint exam:  1.  Tender to palpation bilateral SI joints  2.  SI compression positive bilaterally  3.  Edil weakly positive bilaterally  4.  PSIS tender on the left       Neurological:      General: No focal deficit present.      Mental Status: She is alert and oriented to person, place, and time.           Assessment/Plan   Problems Addressed this Visit        Other    Arthralgia of multiple sites    Relevant Medications    HYDROcodone-acetaminophen (NORCO) 5-325 MG per tablet      Other Visit Diagnoses     Bilateral sacroiliitis (HCC)    -  Primary    Relevant Orders    SI Joint Injection      Diagnoses       Codes Comments    Bilateral sacroiliitis (HCC)    -  Primary ICD-10-CM: M46.1  ICD-9-CM: 720.2     Arthralgia of multiple sites     ICD-10-CM: M25.50  ICD-9-CM: 719.49           Plan:  1. Excellent relief with lumbar RFA  2. Refill rare hydrocodone 5 mg  3. Previously excellent response to bilateral SI joint injections.  We will plan to repeat    --- Follow-up next available for bilateral SI joint injections           INSPECT REPORT    As part of the patient's treatment plan, I may be prescribing controlled substances. The patient has been made aware of appropriate  use of such medications, including potential risk of somnolence, limited ability to drive and/or work safely, and the potential for dependence or overdose. It has also bee made clear that these medications are for use by this patient only, without concomitant use of alcohol or other substances unless prescribed.     Patient has completed prescribing agreement detailing terms of continued prescribing of controlled substances, including monitoring ALYCE reports, urine drug screening, and pill counts if necessary. The patient is aware that inappropriate use will results in cessation of prescribing such medications.    INSPECT report has been reviewed and scanned into the patient's chart.    As the clinician, I personally reviewed the INSPECT from 1/18/2022.    History and physical exam exhibit continued safe and appropriate use of controlled substances.      EMR Dragon/Transcription disclaimer:   Much of this encounter note is an electronic transcription/translation of spoken language to printed text. The electronic translation of spoken language may permit erroneous, or at times, nonsensical words or phrases to be inadvertently transcribed; Although I have reviewed the note for such errors, some may still exist.

## 2022-01-27 ENCOUNTER — HOSPITAL ENCOUNTER (OUTPATIENT)
Dept: PAIN MEDICINE | Facility: HOSPITAL | Age: 67
Discharge: HOME | End: 2022-01-27

## 2022-01-27 ENCOUNTER — HOSPITAL ENCOUNTER (OUTPATIENT)
Dept: GENERAL RADIOLOGY | Facility: HOSPITAL | Age: 67
Discharge: HOME | End: 2022-01-27

## 2022-01-27 VITALS
HEART RATE: 66 BPM | SYSTOLIC BLOOD PRESSURE: 140 MMHG | RESPIRATION RATE: 16 BRPM | TEMPERATURE: 97.1 F | HEIGHT: 67 IN | DIASTOLIC BLOOD PRESSURE: 79 MMHG | WEIGHT: 198 LBS | BODY MASS INDEX: 31.08 KG/M2 | OXYGEN SATURATION: 93 %

## 2022-01-27 DIAGNOSIS — G89.29 CHRONIC SACROILIAC PAIN: ICD-10-CM

## 2022-01-27 DIAGNOSIS — M46.1 BILATERAL SACROILIITIS: Primary | ICD-10-CM

## 2022-01-27 DIAGNOSIS — M53.3 CHRONIC SACROILIAC PAIN: ICD-10-CM

## 2022-01-27 PROCEDURE — 77003 FLUOROGUIDE FOR SPINE INJECT: CPT

## 2022-01-27 PROCEDURE — 0 IOPAMIDOL 41 % SOLUTION: Performed by: STUDENT IN AN ORGANIZED HEALTH CARE EDUCATION/TRAINING PROGRAM

## 2022-01-27 PROCEDURE — 25010000002 METHYLPREDNISOLONE PER 40 MG: Performed by: STUDENT IN AN ORGANIZED HEALTH CARE EDUCATION/TRAINING PROGRAM

## 2022-01-27 PROCEDURE — 27096 INJECT SACROILIAC JOINT: CPT | Performed by: STUDENT IN AN ORGANIZED HEALTH CARE EDUCATION/TRAINING PROGRAM

## 2022-01-27 RX ORDER — METHYLPREDNISOLONE ACETATE 40 MG/ML
40 INJECTION, SUSPENSION INTRA-ARTICULAR; INTRALESIONAL; INTRAMUSCULAR; SOFT TISSUE ONCE
Status: COMPLETED | OUTPATIENT
Start: 2022-01-27 | End: 2022-01-27

## 2022-01-27 RX ORDER — BUPIVACAINE HYDROCHLORIDE 2.5 MG/ML
10 INJECTION, SOLUTION EPIDURAL; INFILTRATION; INTRACAUDAL ONCE
Status: COMPLETED | OUTPATIENT
Start: 2022-01-27 | End: 2022-01-27

## 2022-01-27 RX ADMIN — BUPIVACAINE HYDROCHLORIDE 10 ML: 2.5 INJECTION, SOLUTION EPIDURAL; INFILTRATION; INTRACAUDAL; PERINEURAL at 09:39

## 2022-01-27 RX ADMIN — METHYLPREDNISOLONE ACETATE 40 MG: 40 INJECTION, SUSPENSION INTRA-ARTICULAR; INTRALESIONAL; INTRAMUSCULAR; SOFT TISSUE at 09:40

## 2022-01-27 RX ADMIN — IOPAMIDOL 3 ML: 408 INJECTION, SOLUTION INTRATHECAL at 09:39

## 2022-01-27 NOTE — PROCEDURES
Bilateral Sacroiliac Joint Injection  [facility]      PREOPERATIVE DIAGNOSIS:   Sacroiliac joint dysfunction, bilateral    POSTOPERATIVE DIAGNOSIS:  Sacroiliac joint dysfunction, bilateral    PROCEDURE:  Sacroiliac Joint Injection, Bilaterally, with fluoroscopic guidance    PRE-PROCEDURE DISCUSSION WITH PATIENT:    Risks and complications were discussed with the patient prior to starting the procedure and informed consent was obtained.  We discussed various topics including but not limited to bleeding, infection, injury, postprocedural site soreness, painful flareup, worsening of clinical picture, paralysis, coma, and death.     SURGEON:  Hakan Bolivar MD    REASON FOR PROCEDURE:    Patient has pain consistent with SI pathology on history and physical exam. Positive sacroiliac provocation maneuvers noted.   Tender to palpation over the affected SI joint. Positive FABERE.     SEDATION:  Patient declined administration of moderate sedation    ANESTHETIC AGENT:  Marcaine 0.25%  STEROID AGENT:  Methylprednisolone (DEPO MEDROL) 40mg/ml    DESCRIPTON OF PROCEDURE:  After obtaining informed consent, IV access was not obtained in the preoperative area.  The patient was transported to the operative suite and placed in the prone position with a pillow under the pelvic area.  The lumbosacral area was prepped with Chloraprep and draped in a sterile fashion. Under fluoroscopic guidance the inferior most portion of the right SI joint was identified. The overlying skin and subcutaneous tissue was anesthetized with 1% lidocaine. A 25-gauge spinal needle was introduced from the inferior most portion of the joint into the RIGHT SI joint under fluoroscopic guidance in the AP dimension with slight oblique rotation to the contralateral side.  Aspiration was negative.  After confirming the position of the needle with fluoroscopy, 1 mL of Omnipaque was injected and after seeing appropriate spread into the joint a total of 4mL  Marcaine, with the steroid, was injected very slowly.  Needle was removed intact.  A similar procedure was performed on the LEFT side.   Vital signs remained stable.  The onset of analgesia was noted.      ESTIMATED BLOOD LOSS:  minimal  SPECIMENS:  None  COMPLICATIONS:  No complications were noted., There was no indication of vascular uptake on live injection of contrast dye. and There was no indication of intrathecal uptake on live injection of contrast dye.    TOLERANCE & DISCHARGE CONDITION:    The patient tolerated the procedure well.  The patient was transported to the recovery area without difficulties.  The patient was discharged to home under the care of family in stable and satisfactory condition.    PLAN OF CARE:  1. The patient was given our standard instruction sheet and will resume all medications as per the medication reconciliation sheet.  2. The patient will Return to clinic 4-6 wks.  3. The patient is instructed to keep a pain log hourly for 8 hours after the procedure.

## 2022-02-18 ENCOUNTER — OFFICE VISIT (OUTPATIENT)
Dept: PAIN MEDICINE | Facility: CLINIC | Age: 67
End: 2022-02-18

## 2022-02-18 VITALS
WEIGHT: 198 LBS | SYSTOLIC BLOOD PRESSURE: 94 MMHG | RESPIRATION RATE: 16 BRPM | HEIGHT: 67 IN | OXYGEN SATURATION: 95 % | DIASTOLIC BLOOD PRESSURE: 61 MMHG | BODY MASS INDEX: 31.08 KG/M2 | HEART RATE: 70 BPM

## 2022-02-18 DIAGNOSIS — M25.50 ARTHRALGIA OF MULTIPLE SITES: ICD-10-CM

## 2022-02-18 PROCEDURE — G0463 HOSPITAL OUTPT CLINIC VISIT: HCPCS

## 2022-02-18 PROCEDURE — 99214 OFFICE O/P EST MOD 30 MIN: CPT | Performed by: STUDENT IN AN ORGANIZED HEALTH CARE EDUCATION/TRAINING PROGRAM

## 2022-02-18 PROCEDURE — G0463 HOSPITAL OUTPT CLINIC VISIT: HCPCS | Performed by: STUDENT IN AN ORGANIZED HEALTH CARE EDUCATION/TRAINING PROGRAM

## 2022-02-18 RX ORDER — HYDROCODONE BITARTRATE AND ACETAMINOPHEN 5; 325 MG/1; MG/1
1 TABLET ORAL DAILY PRN
Qty: 30 TABLET | Refills: 0 | Status: SHIPPED | OUTPATIENT
Start: 2022-02-24 | End: 2022-04-21 | Stop reason: SDUPTHER

## 2022-02-18 NOTE — PROGRESS NOTES
CHIEF COMPLAINT  Chief Complaint   Patient presents with   • Back Pain     [facility] 2/14       Primary Care  Sravanthi, Sade POLK MD    Subjective   Genevieve Kuo is a 66 y.o. female  who presents for chronic radicular neck pain as well as right shoulder pain.  She states that she is had longstanding pain in the neck as she used to work in a restaurant waiting tables carry heavy trays.  She states that most recently, she was helping to lift a calf and felt her shoulder pull and since that time has had continued pain.  She is currently working with physical therapy which offers her minimal benefit.  She will occasionally take Tylenol, but is allergic to NSAIDs.  She denies any significant numbness or weakness in the hands describes it primarily as a sharp, stabbing pain into the shoulder as well as aching occasional stabbing pains in the neck.    Sciatica  Associated symptoms include neck pain. Pertinent negatives include no numbness or weakness.   Back Pain  Pertinent negatives include no numbness or weakness.        Location: Neck, right shoulder  Onset: The neck many years ago, the right shoulder approximately a year ago  Duration: Progressively worsening  Timing: Constant throughout the day  Quality: Sharp, stabbing pains with occasional burning sensations  Severity: Today: 1       Last Week: 1       Worst: 7  Modifying Factors: The pain is worse with any type of physical activity and moving and lifting.  Pain is slightly improved with rest and acetaminophen and TENS unit    Physical Therapy: yes    Interval Update 02/18/2022: Continues to do very well from lumbar RFA as well as SI joint injection. Today, she states she has no pain. Does well with occasional hydrocodone.    The following portions of the patient's history were reviewed and updated as appropriate: allergies, current medications, past family history, past medical history, past social history, past surgical history and problem list.      Current  "Outpatient Medications:   •  albuterol (PROVENTIL) (2.5 MG/3ML) 0.083% nebulizer solution, Inhale As Needed., Disp: , Rfl:   •  atorvastatin (LIPITOR) 10 MG tablet, TAKE 1 TABLET BY MOUTH EVERY DAY, Disp: 90 tablet, Rfl: 4  •  escitalopram (LEXAPRO) 20 MG tablet, TAKE 1 TABLET BY MOUTH EVERY DAY, Disp: 90 tablet, Rfl: 4  •  fluticasone (FLONASE) 50 MCG/ACT nasal spray, 2 sprays into the nostril(s) as directed by provider Daily., Disp: 16 g, Rfl: 3  •  gabapentin (NEURONTIN) 300 MG capsule, TAKE 1 CAPSULE BY MOUTH TWICE A DAY, Disp: 60 capsule, Rfl: 12  •  [START ON 2/24/2022] HYDROcodone-acetaminophen (NORCO) 5-325 MG per tablet, Take 1 tablet by mouth Daily As Needed for Severe Pain ., Disp: 30 tablet, Rfl: 0  •  levothyroxine (SYNTHROID, LEVOTHROID) 75 MCG tablet, Take 1 tablet by mouth Daily., Disp: 60 tablet, Rfl: 0  •  meclizine (ANTIVERT) 25 MG tablet, Take 1 tablet by mouth 4 (Four) Times a Day., Disp: 40 tablet, Rfl: 3  •  montelukast (SINGULAIR) 10 MG tablet, TAKE 1 TABLET BY MOUTH EVERY DAY, Disp: 90 tablet, Rfl: 4  •  Omega-3 Fatty Acids (FISH OIL) 1000 MG capsule capsule, Take 1 capsule by mouth 3 (Three) Times a Day., Disp: , Rfl:   •  Probiotic capsule, Take 1 capsule by mouth Daily., Disp: , Rfl:   •  tiZANidine (ZANAFLEX) 4 MG tablet, Take 1 tablet by mouth At Night As Needed for Muscle Spasms., Disp: 30 tablet, Rfl: 2    Review of Systems   Musculoskeletal: Positive for back pain, neck pain and neck stiffness.        Right shoulder pain   Neurological: Negative for weakness and numbness.       Vitals:    02/18/22 0951   BP: 94/61   Pulse: 70   Resp: 16   SpO2: 95%   Weight: 89.8 kg (198 lb)   Height: 170.2 cm (67\")   PainSc: 0-No pain       Objective   Physical Exam  Vitals and nursing note reviewed.   Constitutional:       General: She is not in acute distress.     Appearance: Normal appearance. She is well-developed. She is obese.   Neck:      Trachea: No tracheal deviation.      Comments: " Cervical spine exam:  1.  Minimally tender bilateral paraspinal musculature  2.  Minimally tender bilateral right greater than left cervical facets  3.  Cervical facet loading weakly positive on the right  4.  Spurling positive bilaterally  Musculoskeletal:      Right shoulder: Tenderness present. No swelling. Decreased range of motion. Decreased strength.      Comments: Lumbar Spine Exam:  Tender to palpation over the lumbar paraspinal musculature Yes  Limited range of motion secondary to pain Yes  Facet loading positive: bilateral  Facets tender to palpation: bilateral  Straight leg raise test positive: Negative    SI joint exam:  1.  Tender to palpation bilateral SI joints  2.  SI compression positive bilaterally  3.  Edil weakly positive bilaterally  4.  PSIS tender on the left       Neurological:      General: No focal deficit present.      Mental Status: She is alert and oriented to person, place, and time.           Assessment/Plan   Problems Addressed this Visit        Other    Arthralgia of multiple sites    Relevant Medications    HYDROcodone-acetaminophen (NORCO) 5-325 MG per tablet (Start on 2/24/2022)      Diagnoses       Codes Comments    Arthralgia of multiple sites     ICD-10-CM: M25.50  ICD-9-CM: 719.49           Plan:  1. Excellent relief with lumbar RFA  2. Refill rare hydrocodone 5 mg  3. Excellent relief with SI joint injections    --- Follow-up as needed           INSPECT REPORT    As part of the patient's treatment plan, I may be prescribing controlled substances. The patient has been made aware of appropriate use of such medications, including potential risk of somnolence, limited ability to drive and/or work safely, and the potential for dependence or overdose. It has also bee made clear that these medications are for use by this patient only, without concomitant use of alcohol or other substances unless prescribed.     Patient has completed prescribing agreement detailing terms of  continued prescribing of controlled substances, including monitoring ALYCE reports, urine drug screening, and pill counts if necessary. The patient is aware that inappropriate use will results in cessation of prescribing such medications.    INSPECT report has been reviewed and scanned into the patient's chart.    As the clinician, I personally reviewed the INSPECT from 2/16/2022.    History and physical exam exhibit continued safe and appropriate use of controlled substances.      EMR Dragon/Transcription disclaimer:   Much of this encounter note is an electronic transcription/translation of spoken language to printed text. The electronic translation of spoken language may permit erroneous, or at times, nonsensical words or phrases to be inadvertently transcribed; Although I have reviewed the note for such errors, some may still exist.

## 2022-02-28 ENCOUNTER — OFFICE VISIT (OUTPATIENT)
Dept: FAMILY MEDICINE CLINIC | Facility: CLINIC | Age: 67
End: 2022-02-28

## 2022-02-28 VITALS
HEIGHT: 66 IN | BODY MASS INDEX: 32.02 KG/M2 | OXYGEN SATURATION: 96 % | TEMPERATURE: 97.7 F | DIASTOLIC BLOOD PRESSURE: 70 MMHG | RESPIRATION RATE: 16 BRPM | SYSTOLIC BLOOD PRESSURE: 100 MMHG | HEART RATE: 83 BPM | WEIGHT: 199.2 LBS

## 2022-02-28 DIAGNOSIS — E88.81 INSULIN RESISTANCE: ICD-10-CM

## 2022-02-28 DIAGNOSIS — F41.9 ANXIETY AND DEPRESSION: ICD-10-CM

## 2022-02-28 DIAGNOSIS — E66.09 CLASS 1 OBESITY DUE TO EXCESS CALORIES WITH SERIOUS COMORBIDITY AND BODY MASS INDEX (BMI) OF 32.0 TO 32.9 IN ADULT: ICD-10-CM

## 2022-02-28 DIAGNOSIS — F32.A ANXIETY AND DEPRESSION: ICD-10-CM

## 2022-02-28 DIAGNOSIS — R73.09 ELEVATED GLUCOSE: Primary | ICD-10-CM

## 2022-02-28 DIAGNOSIS — E03.9 ACQUIRED HYPOTHYROIDISM: ICD-10-CM

## 2022-02-28 DIAGNOSIS — E78.2 MIXED HYPERLIPIDEMIA: ICD-10-CM

## 2022-02-28 DIAGNOSIS — R74.8 ELEVATED ALKALINE PHOSPHATASE LEVEL: ICD-10-CM

## 2022-02-28 DIAGNOSIS — H10.13 ALLERGIC CONJUNCTIVITIS OF BOTH EYES: ICD-10-CM

## 2022-02-28 LAB — GLUCOSE BLDC GLUCOMTR-MCNC: 114 MG/DL (ref 70–130)

## 2022-02-28 PROCEDURE — 99214 OFFICE O/P EST MOD 30 MIN: CPT | Performed by: FAMILY MEDICINE

## 2022-02-28 PROCEDURE — 82962 GLUCOSE BLOOD TEST: CPT | Performed by: FAMILY MEDICINE

## 2022-02-28 PROCEDURE — 36415 COLL VENOUS BLD VENIPUNCTURE: CPT | Performed by: FAMILY MEDICINE

## 2022-02-28 RX ORDER — DICLOFENAC SODIUM 1 MG/ML
1 SOLUTION/ DROPS OPHTHALMIC 4 TIMES DAILY
Qty: 5 ML | Refills: 6 | Status: SHIPPED | OUTPATIENT
Start: 2022-02-28 | End: 2022-06-14 | Stop reason: SDUPTHER

## 2022-02-28 RX ORDER — BUPROPION HYDROCHLORIDE 300 MG/1
300 TABLET ORAL DAILY
Qty: 30 TABLET | Refills: 12 | Status: SHIPPED | OUTPATIENT
Start: 2022-02-28 | End: 2022-03-22

## 2022-03-01 LAB
ALBUMIN SERPL-MCNC: 4.4 G/DL (ref 3.8–4.8)
ALBUMIN/GLOB SERPL: 2 {RATIO} (ref 1.2–2.2)
ALP SERPL-CCNC: 128 IU/L (ref 44–121)
ALT SERPL-CCNC: 13 IU/L (ref 0–32)
AST SERPL-CCNC: 15 IU/L (ref 0–40)
BILIRUB SERPL-MCNC: 0.4 MG/DL (ref 0–1.2)
BUN SERPL-MCNC: 21 MG/DL (ref 8–27)
BUN/CREAT SERPL: 26 (ref 12–28)
CALCIUM SERPL-MCNC: 8.9 MG/DL (ref 8.7–10.3)
CHLORIDE SERPL-SCNC: 105 MMOL/L (ref 96–106)
CHOLEST SERPL-MCNC: 141 MG/DL (ref 100–199)
CHOLEST/HDLC SERPL: 3.7 RATIO (ref 0–4.4)
CO2 SERPL-SCNC: 20 MMOL/L (ref 20–29)
CREAT SERPL-MCNC: 0.82 MG/DL (ref 0.57–1)
EGFR GENE MUT ANL BLD/T: 77 ML/MIN/1.73
GLOBULIN SER CALC-MCNC: 2.2 G/DL (ref 1.5–4.5)
GLUCOSE SERPL-MCNC: 105 MG/DL (ref 65–99)
HDLC SERPL-MCNC: 38 MG/DL
LDLC SERPL CALC-MCNC: 71 MG/DL (ref 0–99)
POTASSIUM SERPL-SCNC: 4.3 MMOL/L (ref 3.5–5.2)
PROT SERPL-MCNC: 6.6 G/DL (ref 6–8.5)
SODIUM SERPL-SCNC: 142 MMOL/L (ref 134–144)
TRIGL SERPL-MCNC: 191 MG/DL (ref 0–149)
TSH SERPL DL<=0.005 MIU/L-ACNC: 1.94 UIU/ML (ref 0.45–4.5)
VLDLC SERPL CALC-MCNC: 32 MG/DL (ref 5–40)

## 2022-03-02 ENCOUNTER — TELEPHONE (OUTPATIENT)
Dept: FAMILY MEDICINE CLINIC | Facility: CLINIC | Age: 67
End: 2022-03-02

## 2022-03-02 DIAGNOSIS — E03.9 ACQUIRED HYPOTHYROIDISM: ICD-10-CM

## 2022-03-02 RX ORDER — LEVOTHYROXINE SODIUM 0.07 MG/1
75 TABLET ORAL DAILY
Qty: 30 TABLET | Refills: 12 | Status: SHIPPED | OUTPATIENT
Start: 2022-03-02 | End: 2022-03-28

## 2022-03-02 NOTE — TELEPHONE ENCOUNTER
Caller: Genevieve Kuo    Relationship: Self    Best call back number: [phone]    Requested Prescriptions:   Requested Prescriptions     Pending Prescriptions Disp Refills   • levothyroxine (SYNTHROID, LEVOTHROID) 75 MCG tablet 60 tablet 0     Sig: Take 1 tablet by mouth Daily.        Pharmacy where request should be sent: [facility] #[id number] - [place], [place] [address] - [phone]  - [phone]     Additional details provided by patient: PATIENT NEEDING ADVISED ON THE MEDICATION IF THERE ARE ANY CHANGES.    PATIENT COMPLETELY OUT.    Does the patient have less than a 3 day supply:  [x] Yes  [] No    Shayne Connors Rep   03/02/22 10:15 EST

## 2022-03-02 NOTE — TELEPHONE ENCOUNTER
Caller: Genevieve Kuo    Relationship: Self    Best call back number: [phone]    Caller requesting test results: PATIENT    What test was performed: LABS    When was the test performed: 02/28    Where was the test performed: IN OFFICE    Additional notes: PATIENT STATED HOPING TOP RECEIVE LAB TEST RESULTS TO SEE IF ANY OF HER MEDS NEED TO BE CHANGED.

## 2022-03-06 PROBLEM — E88.81 INSULIN RESISTANCE: Status: ACTIVE | Noted: 2022-03-06

## 2022-03-06 PROBLEM — E88.819 INSULIN RESISTANCE: Status: ACTIVE | Noted: 2022-03-06

## 2022-03-22 DIAGNOSIS — F41.9 ANXIETY AND DEPRESSION: ICD-10-CM

## 2022-03-22 DIAGNOSIS — F32.A ANXIETY AND DEPRESSION: ICD-10-CM

## 2022-03-22 RX ORDER — BUPROPION HYDROCHLORIDE 300 MG/1
TABLET ORAL
Qty: 30 TABLET | Refills: 12 | Status: SHIPPED | OUTPATIENT
Start: 2022-03-22 | End: 2022-06-14 | Stop reason: SDUPTHER

## 2022-03-26 DIAGNOSIS — E03.9 ACQUIRED HYPOTHYROIDISM: ICD-10-CM

## 2022-03-28 ENCOUNTER — TELEPHONE (OUTPATIENT)
Dept: FAMILY MEDICINE CLINIC | Facility: CLINIC | Age: 67
End: 2022-03-28

## 2022-03-28 RX ORDER — LEVOTHYROXINE SODIUM 0.07 MG/1
TABLET ORAL
Qty: 30 TABLET | Refills: 12 | Status: SHIPPED | OUTPATIENT
Start: 2022-03-28 | End: 2022-06-14 | Stop reason: SDUPTHER

## 2022-03-28 NOTE — TELEPHONE ENCOUNTER
Caller: Genevieve Kuo    Relationship: Self    Best call back number: [phone]    What medication are you requesting: SOMETHING FOR DIZZINESS    What are your current symptoms: PATIENT IS STILL REAL DIZZY AND NOTHING SEEMS TO HELP?    If a prescription is needed, what is your preferred pharmacy and phone [id number] [phone]      Additional notes:

## 2022-04-17 DIAGNOSIS — E03.9 ACQUIRED HYPOTHYROIDISM: ICD-10-CM

## 2022-04-18 RX ORDER — LEVOTHYROXINE SODIUM 88 UG/1
TABLET ORAL
Qty: 90 TABLET | Refills: 4 | OUTPATIENT
Start: 2022-04-18

## 2022-04-21 DIAGNOSIS — M25.50 ARTHRALGIA OF MULTIPLE SITES: ICD-10-CM

## 2022-04-21 RX ORDER — HYDROCODONE BITARTRATE AND ACETAMINOPHEN 5; 325 MG/1; MG/1
1 TABLET ORAL DAILY PRN
Qty: 30 TABLET | Refills: 0 | Status: SHIPPED | OUTPATIENT
Start: 2022-04-21 | End: 2022-11-04 | Stop reason: SDUPTHER

## 2022-04-28 NOTE — PROGRESS NOTES
"Chief Complaint  Memory Loss    Subjective            Genevieve Kuo presents to [facility] for Memory Loss  History of Present Illness   New patient referred by  for memory loss. Patient states her memory is getting worse in the past 6  Months, such as getting lost in her hometown, short term memory, and loss of words.She is not currently taking medication for memory. Patient does have  h/o dementia ( mom,maternal aunt)    Pt fell out of bed and hit head, had ct of head about June of last year  Since then noted some trouble with memory.  Stare off into space, lose track mid sentence, or word finding problem  Put wrong name tags on presents,   Ordered incorrect count on [employer].    Had fallen out two or three times. Not dreaming activity.     Has had vertigo. Helped with taking meclizine.   Sometimes a feeling of falling. Sometimes feel woozy    Quit driving due to dizziness,  Couldn't remember which way to go to go home.  She noted passing where she intended to turn.   Lost in living room.  She yelled for help, did not recognize where she was at . Over one year ago.     Was a  for years. Now a pitch in dinner makes her nervous.   How gown on backwards the other night    Mother and maternal aunt had memory problems, onset in 80's    History of snoring and day time fatigue      Maximum   Score Patient's   Score Questions   5 5 \"What is the year?Season?Date?Day of the week?Month?\"   5 5 \"Where are we now: State?County?Town/city?Hospital?Floor?\"   3 3 3 Unrelated objects Number of trials:___   5 5 Count backward from 100 by sevens or spell WORLD backwards   3 2 Name 3 things from above   2 2 Identify 2 objects   1 1 Repeat the phrase: No ifs, ands,or buts.   3 3 Take paper in right hand, fold it in half, and put it on the floor.   1 1  Please read this and do what it says. \"Close your eyes\"   1 1  Make up and write a sentence about anything. Noun and verb   1 1 Copy " this picture 10 angles must be present.   30 29 Total MMSE         ====MRI====  Dr Fishman: MRI of the brain showed no intracranial abnormality There was possible evidence of small vessel disease, meaning minimal hardening in the small vessels of your brain, not an unusual finding for your age. Let us know if you not improved. Take Care, Dr. Fishman    Family History   Problem Relation Age of Onset   • Dementia Mother    • Colon cancer Mother    • Hypertension Mother    • Ovarian cancer Mother 65   • Colon cancer Father    • COPD Father    • Lung cancer Father    • Emphysema Father    • Heart disease Brother    • Hypertension Brother    • COPD Brother    • Dementia Maternal Aunt    • Multiple sclerosis Daughter    • Breast cancer Neg Hx        Past Medical History:   Diagnosis Date   • Allergic    • Anxiety    • Arthritis    • Asthma    • Breast cyst, right 1999    cyst removed   • Hyperlipidemia    • Hypothyroidism    • Low back pain        Social History     Socioeconomic History   • Marital status:    Tobacco Use   • Smoking status: Never Smoker   • Smokeless tobacco: Never Used   Vaping Use   • Vaping Use: Never used   Substance and Sexual Activity   • Alcohol use: No   • Drug use: No   • Sexual activity: Defer         Current Outpatient Medications:   •  albuterol (PROVENTIL) (2.5 MG/3ML) 0.083% nebulizer solution, Inhale As Needed., Disp: , Rfl:   •  atorvastatin (LIPITOR) 10 MG tablet, TAKE 1 TABLET BY MOUTH EVERY DAY, Disp: 90 tablet, Rfl: 4  •  buPROPion XL (WELLBUTRIN XL) 300 MG 24 hr tablet, TAKE 1 TABLET BY MOUTH EVERY DAY, Disp: 30 tablet, Rfl: 12  •  cetirizine (zyrTEC) 10 MG tablet, , Disp: , Rfl:   •  ciprofloxacin-hydrocortisone (Cipro HC) 0.2-1 % otic suspension, , Disp: , Rfl:   •  diclofenac (VOLTAREN) 0.1 % ophthalmic solution, Administer 1 drop to both eyes 4 (Four) Times a Day., Disp: 5 mL, Rfl: 6  •  escitalopram (LEXAPRO) 20 MG tablet, TAKE 1 TABLET BY MOUTH EVERY DAY, Disp: 90 tablet,  "Rfl: 4  •  fluticasone (FLONASE) 50 MCG/ACT nasal spray, 2 sprays into the nostril(s) as directed by provider Daily., Disp: 16 g, Rfl: 3  •  gabapentin (NEURONTIN) 300 MG capsule, TAKE 1 CAPSULE BY MOUTH TWICE A DAY, Disp: 60 capsule, Rfl: 12  •  HYDROcodone-acetaminophen (NORCO) 5-325 MG per tablet, Take 1 tablet by mouth Daily As Needed for Severe Pain ., Disp: 30 tablet, Rfl: 0  •  levothyroxine (SYNTHROID, LEVOTHROID) 75 MCG tablet, TAKE 1 TABLET BY MOUTH EVERY DAY, Disp: 30 tablet, Rfl: 12  •  meclizine (ANTIVERT) 25 MG tablet, Take 1 tablet by mouth 4 (Four) Times a Day., Disp: 40 tablet, Rfl: 3  •  montelukast (SINGULAIR) 10 MG tablet, TAKE 1 TABLET BY MOUTH EVERY DAY, Disp: 90 tablet, Rfl: 4  •  Omega-3 Fatty Acids (FISH OIL) 1000 MG capsule capsule, Take 1 capsule by mouth 3 (Three) Times a Day., Disp: , Rfl:   •  Probiotic capsule, Take 1 capsule by mouth Daily., Disp: , Rfl:   •  tiZANidine (ZANAFLEX) 4 MG tablet, Take 1 tablet by mouth At Night As Needed for Muscle Spasms., Disp: 30 tablet, Rfl: 2    Review of Systems   Constitutional: Positive for fatigue. Negative for appetite change.   HENT: Positive for sinus pressure. Negative for hearing loss.    Eyes: Positive for itching.   Respiratory: Positive for cough. Negative for apnea.    Cardiovascular: Negative for chest pain and leg swelling.   Gastrointestinal: Positive for diarrhea. Negative for anal bleeding.   Endocrine: Negative for cold intolerance and heat intolerance.   Genitourinary: Negative for enuresis and flank pain.   Musculoskeletal: Positive for back pain, neck pain and neck stiffness.   Allergic/Immunologic: Positive for environmental allergies.   Neurological: Positive for dizziness and light-headedness.   Hematological: Negative for adenopathy.   Psychiatric/Behavioral: Positive for confusion and decreased concentration.            Objective   Vital Signs:   /74   Pulse 74   Temp 98 °F (36.7 °C) (Temporal)   Ht 167.6 cm (66\") "   Wt 89.4 kg (197 lb)   BMI 31.80 kg/m²     Physical Exam  Vitals reviewed.   Constitutional:       Appearance: Normal appearance.   HENT:      Head: Normocephalic.      Nose: Nose normal.   Eyes:      Pupils: Pupils are equal, round, and reactive to light.   Cardiovascular:      Rate and Rhythm: Normal rate.   Pulmonary:      Effort: Pulmonary effort is normal. No respiratory distress.   Neurological:      General: No focal deficit present.      Mental Status: She is alert and oriented to person, place, and time.   Psychiatric:         Mood and Affect: Mood normal.        Result Review :                Neurologic Exam     Mental Status   Oriented to person, place, and time.     Cranial Nerves     CN III, IV, VI   Pupils are equal, round, and reactive to light.             Assessment and Plan    Diagnoses and all orders for this visit:    1. Memory loss (Primary)  -     Vitamin D 1,25 Dihydroxy; Future  -     Vitamin B6; Future  -     Vitamin E; Future  -     T Pallidum Antibody (FTA-Ab); Future    2. Family history of dementia    3. Anxiety and depression    4. CAMILA (obstructive sleep apnea)  -     Home Sleep Study; Future    possible early alzheimer    probable camila  Check labs and hst  Encourage brain exercises, physical exercise.        Follow Up   Return in about 3 months (around 7/29/2022).  Patient was given instructions and counseling regarding her condition or for health maintenance advice. Please see specific information pulled into the AVS if appropriate.         This document has been electronically signed by Joseph Seipel, MD on April 29, 2022 11:58 EDT

## 2022-04-29 ENCOUNTER — OFFICE VISIT (OUTPATIENT)
Dept: NEUROLOGY | Facility: CLINIC | Age: 67
End: 2022-04-29

## 2022-04-29 VITALS
BODY MASS INDEX: 31.66 KG/M2 | DIASTOLIC BLOOD PRESSURE: 74 MMHG | HEART RATE: 74 BPM | TEMPERATURE: 98 F | SYSTOLIC BLOOD PRESSURE: 105 MMHG | HEIGHT: 66 IN | WEIGHT: 197 LBS

## 2022-04-29 DIAGNOSIS — G47.33 OSA (OBSTRUCTIVE SLEEP APNEA): ICD-10-CM

## 2022-04-29 DIAGNOSIS — Z81.8 FAMILY HISTORY OF DEMENTIA: ICD-10-CM

## 2022-04-29 DIAGNOSIS — F32.A ANXIETY AND DEPRESSION: ICD-10-CM

## 2022-04-29 DIAGNOSIS — R41.3 MEMORY LOSS: Primary | ICD-10-CM

## 2022-04-29 DIAGNOSIS — F41.9 ANXIETY AND DEPRESSION: ICD-10-CM

## 2022-04-29 PROBLEM — H35.361 DRUSEN OF RIGHT MACULA: Status: ACTIVE | Noted: 2022-04-07

## 2022-04-29 PROCEDURE — 99204 OFFICE O/P NEW MOD 45 MIN: CPT | Performed by: PSYCHIATRY & NEUROLOGY

## 2022-04-29 RX ORDER — CIPROFLOXACIN/HYDROCORTISONE 0.2 %-1 %
SUSPENSION, DROPS(FINAL DOSAGE FORM)(ML) OTIC (EAR)
COMMUNITY
End: 2022-06-14

## 2022-04-29 RX ORDER — CETIRIZINE HYDROCHLORIDE 10 MG/1
TABLET ORAL
COMMUNITY

## 2022-04-29 RX ORDER — LEVOTHYROXINE SODIUM 75 UG/1
CAPSULE ORAL
COMMUNITY
End: 2022-04-29 | Stop reason: SDUPTHER

## 2022-05-20 ENCOUNTER — LAB (OUTPATIENT)
Dept: LAB | Facility: HOSPITAL | Age: 67
End: 2022-05-20

## 2022-05-20 DIAGNOSIS — R41.3 MEMORY LOSS: ICD-10-CM

## 2022-05-20 PROCEDURE — 86780 TREPONEMA PALLIDUM: CPT

## 2022-05-20 PROCEDURE — 82652 VIT D 1 25-DIHYDROXY: CPT

## 2022-05-20 PROCEDURE — 36415 COLL VENOUS BLD VENIPUNCTURE: CPT

## 2022-05-20 PROCEDURE — 84207 ASSAY OF VITAMIN B-6: CPT

## 2022-05-20 PROCEDURE — 84446 ASSAY OF VITAMIN E: CPT

## 2022-05-22 LAB — 1,25(OH)2D SERPL-MCNC: 70.9 PG/ML (ref 19.9–79.3)

## 2022-05-24 LAB — T PALLIDUM AB SER QL IF: NON REACTIVE

## 2022-05-31 LAB
A-TOCOPHEROL VIT E SERPL-MCNC: 12.1 MG/L (ref 9–29)
GAMMA TOCOPHEROL SERPL-MCNC: 1.9 MG/L (ref 0.5–4.9)

## 2022-06-06 ENCOUNTER — HOSPITAL ENCOUNTER (OUTPATIENT)
Dept: SLEEP MEDICINE | Facility: HOSPITAL | Age: 67
Discharge: HOME | End: 2022-06-06
Admitting: PSYCHIATRY & NEUROLOGY

## 2022-06-06 DIAGNOSIS — G47.33 OSA (OBSTRUCTIVE SLEEP APNEA): ICD-10-CM

## 2022-06-06 PROCEDURE — 95806 SLEEP STUDY UNATT&RESP EFFT: CPT | Performed by: PSYCHIATRY & NEUROLOGY

## 2022-06-06 PROCEDURE — 95806 SLEEP STUDY UNATT&RESP EFFT: CPT

## 2022-06-14 ENCOUNTER — OFFICE VISIT (OUTPATIENT)
Dept: FAMILY MEDICINE CLINIC | Facility: CLINIC | Age: 67
End: 2022-06-14

## 2022-06-14 VITALS
BODY MASS INDEX: 32.96 KG/M2 | DIASTOLIC BLOOD PRESSURE: 72 MMHG | TEMPERATURE: 97.7 F | OXYGEN SATURATION: 95 % | SYSTOLIC BLOOD PRESSURE: 128 MMHG | HEART RATE: 83 BPM | WEIGHT: 197.8 LBS | HEIGHT: 65 IN | RESPIRATION RATE: 18 BRPM

## 2022-06-14 DIAGNOSIS — Z00.00 MEDICARE ANNUAL WELLNESS VISIT, SUBSEQUENT: Primary | ICD-10-CM

## 2022-06-14 DIAGNOSIS — Z81.8 FAMILY HISTORY OF DEMENTIA: ICD-10-CM

## 2022-06-14 DIAGNOSIS — F41.9 ANXIETY AND DEPRESSION: ICD-10-CM

## 2022-06-14 DIAGNOSIS — G31.84 MILD COGNITIVE IMPAIRMENT WITH MEMORY LOSS: ICD-10-CM

## 2022-06-14 DIAGNOSIS — Z12.31 ENCOUNTER FOR SCREENING MAMMOGRAM FOR MALIGNANT NEOPLASM OF BREAST: ICD-10-CM

## 2022-06-14 DIAGNOSIS — M25.50 ARTHRALGIA OF MULTIPLE SITES: ICD-10-CM

## 2022-06-14 DIAGNOSIS — F32.A ANXIETY AND DEPRESSION: ICD-10-CM

## 2022-06-14 DIAGNOSIS — M50.90 CERVICAL DISC DISEASE: ICD-10-CM

## 2022-06-14 DIAGNOSIS — E88.81 INSULIN RESISTANCE: ICD-10-CM

## 2022-06-14 DIAGNOSIS — E78.2 MIXED HYPERLIPIDEMIA: ICD-10-CM

## 2022-06-14 DIAGNOSIS — E03.9 ACQUIRED HYPOTHYROIDISM: ICD-10-CM

## 2022-06-14 DIAGNOSIS — R73.09 ELEVATED GLUCOSE: ICD-10-CM

## 2022-06-14 DIAGNOSIS — E66.09 CLASS 1 OBESITY DUE TO EXCESS CALORIES WITH SERIOUS COMORBIDITY AND BODY MASS INDEX (BMI) OF 33.0 TO 33.9 IN ADULT: ICD-10-CM

## 2022-06-14 DIAGNOSIS — Z12.4 SCREENING FOR MALIGNANT NEOPLASM OF CERVIX: ICD-10-CM

## 2022-06-14 DIAGNOSIS — M51.06 INTERVERTEBRAL LUMBAR DISC DISORDER WITH MYELOPATHY, LUMBAR REGION: ICD-10-CM

## 2022-06-14 DIAGNOSIS — Z12.11 SPECIAL SCREENING FOR MALIGNANT NEOPLASMS, COLON: ICD-10-CM

## 2022-06-14 DIAGNOSIS — Z80.0 FAMILY HISTORY OF MALIGNANT NEOPLASM OF GASTROINTESTINAL TRACT: ICD-10-CM

## 2022-06-14 DIAGNOSIS — D12.6 TUBULAR ADENOMA OF COLON: ICD-10-CM

## 2022-06-14 DIAGNOSIS — J30.1 CHRONIC SEASONAL ALLERGIC RHINITIS DUE TO POLLEN: ICD-10-CM

## 2022-06-14 DIAGNOSIS — G47.33 OSA (OBSTRUCTIVE SLEEP APNEA): ICD-10-CM

## 2022-06-14 DIAGNOSIS — Z78.0 ASYMPTOMATIC MENOPAUSAL STATE: ICD-10-CM

## 2022-06-14 DIAGNOSIS — H81.13 VERTIGO, BENIGN PAROXYSMAL, BILATERAL: ICD-10-CM

## 2022-06-14 DIAGNOSIS — K57.30 DIVERTICULOSIS OF LARGE INTESTINE WITHOUT HEMORRHAGE: ICD-10-CM

## 2022-06-14 DIAGNOSIS — H10.13 ALLERGIC CONJUNCTIVITIS OF BOTH EYES: ICD-10-CM

## 2022-06-14 PROBLEM — Z71.89 COUNSELING REGARDING END OF LIFE DECISION MAKING: Status: RESOLVED | Noted: 2019-08-28 | Resolved: 2022-06-14

## 2022-06-14 LAB
BILIRUB BLD-MCNC: NEGATIVE MG/DL
CLARITY, POC: CLEAR
COLOR UR: YELLOW
EXPIRATION DATE: NORMAL
GLUCOSE BLDC GLUCOMTR-MCNC: 126 MG/DL (ref 70–130)
GLUCOSE UR STRIP-MCNC: NEGATIVE MG/DL
HBA1C MFR BLD: 6 %
KETONES UR QL: NEGATIVE
LEUKOCYTE EST, POC: NEGATIVE
Lab: NORMAL
NITRITE UR-MCNC: NEGATIVE MG/ML
PH UR: 5 [PH] (ref 5–8)
PROT UR STRIP-MCNC: NEGATIVE MG/DL
RBC # UR STRIP: NEGATIVE /UL
SP GR UR: 1.02 (ref 1–1.03)
UROBILINOGEN UR QL: NORMAL
VIT B6 SERPL-MCNC: 14.8 UG/L (ref 3.4–65.2)

## 2022-06-14 PROCEDURE — 1159F MED LIST DOCD IN RCRD: CPT | Performed by: FAMILY MEDICINE

## 2022-06-14 PROCEDURE — 82947 ASSAY GLUCOSE BLOOD QUANT: CPT | Performed by: FAMILY MEDICINE

## 2022-06-14 PROCEDURE — 1126F AMNT PAIN NOTED NONE PRSNT: CPT | Performed by: FAMILY MEDICINE

## 2022-06-14 PROCEDURE — G0439 PPPS, SUBSEQ VISIT: HCPCS | Performed by: FAMILY MEDICINE

## 2022-06-14 PROCEDURE — 81002 URINALYSIS NONAUTO W/O SCOPE: CPT | Performed by: FAMILY MEDICINE

## 2022-06-14 PROCEDURE — 1170F FXNL STATUS ASSESSED: CPT | Performed by: FAMILY MEDICINE

## 2022-06-14 PROCEDURE — 3044F HG A1C LEVEL LT 7.0%: CPT | Performed by: FAMILY MEDICINE

## 2022-06-14 PROCEDURE — 1158F ADVNC CARE PLAN TLK DOCD: CPT | Performed by: FAMILY MEDICINE

## 2022-06-14 PROCEDURE — G0444 DEPRESSION SCREEN ANNUAL: HCPCS | Performed by: FAMILY MEDICINE

## 2022-06-14 PROCEDURE — 99214 OFFICE O/P EST MOD 30 MIN: CPT | Performed by: FAMILY MEDICINE

## 2022-06-14 PROCEDURE — 83036 HEMOGLOBIN GLYCOSYLATED A1C: CPT | Performed by: FAMILY MEDICINE

## 2022-06-14 PROCEDURE — 99497 ADVNCD CARE PLAN 30 MIN: CPT | Performed by: FAMILY MEDICINE

## 2022-06-14 RX ORDER — FLUTICASONE PROPIONATE 50 MCG
2 SPRAY, SUSPENSION (ML) NASAL DAILY
Qty: 16 G | Refills: 3 | Status: SHIPPED | OUTPATIENT
Start: 2022-06-14

## 2022-06-14 RX ORDER — TIZANIDINE 4 MG/1
4 TABLET ORAL NIGHTLY PRN
Qty: 30 TABLET | Refills: 2 | Status: SHIPPED | OUTPATIENT
Start: 2022-06-14

## 2022-06-14 RX ORDER — MECLIZINE HYDROCHLORIDE 25 MG/1
25 TABLET ORAL 4 TIMES DAILY
Qty: 40 TABLET | Refills: 3 | Status: SHIPPED | OUTPATIENT
Start: 2022-06-14 | End: 2023-01-16

## 2022-06-14 RX ORDER — ESCITALOPRAM OXALATE 20 MG/1
20 TABLET ORAL DAILY
Qty: 90 TABLET | Refills: 4 | Status: SHIPPED | OUTPATIENT
Start: 2022-06-14 | End: 2022-12-16

## 2022-06-14 RX ORDER — BUPROPION HYDROCHLORIDE 300 MG/1
300 TABLET ORAL DAILY
Qty: 90 TABLET | Refills: 3 | Status: SHIPPED | OUTPATIENT
Start: 2022-06-14 | End: 2023-03-24

## 2022-06-14 RX ORDER — ATORVASTATIN CALCIUM 10 MG/1
10 TABLET, FILM COATED ORAL DAILY
Qty: 90 TABLET | Refills: 4 | Status: SHIPPED | OUTPATIENT
Start: 2022-06-14 | End: 2023-03-31

## 2022-06-14 RX ORDER — DICLOFENAC SODIUM 1 MG/ML
1 SOLUTION/ DROPS OPHTHALMIC 4 TIMES DAILY
Qty: 5 ML | Refills: 6 | Status: SHIPPED | OUTPATIENT
Start: 2022-06-14

## 2022-06-14 RX ORDER — MONTELUKAST SODIUM 10 MG/1
10 TABLET ORAL DAILY
Qty: 90 TABLET | Refills: 4 | Status: SHIPPED | OUTPATIENT
Start: 2022-06-14 | End: 2023-03-09

## 2022-06-14 RX ORDER — LEVOTHYROXINE SODIUM 0.07 MG/1
75 TABLET ORAL DAILY
Qty: 90 TABLET | Refills: 3 | Status: SHIPPED | OUTPATIENT
Start: 2022-06-14 | End: 2023-03-24

## 2022-06-14 RX ORDER — GABAPENTIN 300 MG/1
300 CAPSULE ORAL 2 TIMES DAILY
Qty: 180 CAPSULE | Refills: 3 | Status: SHIPPED | OUTPATIENT
Start: 2022-06-14 | End: 2023-01-11

## 2022-06-14 NOTE — PROGRESS NOTES
The ABCs of the Annual Wellness Visit  Subsequent Medicare Wellness Visit    Chief Complaint   Patient presents with   • Medicare Wellness-subsequent   • Blood Sugar problem   • Hyperlipidemia   • Hypothyroidism      Subjective    History of Present Illness:  Genevieve Kuo is a 67 y.o. female who presents for a Subsequent Medicare Wellness Visit. The patient is here: for coordination of medical care to discuss health maintenance and disease prevention to follow up on multiple medical problems. Overall has: moderate activity with work/home activities, good appetite, feels well with minor complaints, decreased energy level and is sleeping well. Nutrition: balanced diet. Last tetanus shot was . Genevieve Kuo is -3, Parity-3. No LMP recorded. Patient has had a hysterectomy. She is postmenopausal.      Genevieve has a Fhx dementia she has been seen by Seipel, with concern for mild cognitive impairment. She is on vitamins and to date is doing well.     Blood Sugar Problem  This is a new problem. The current episode started more than 1 month ago (6 month). Associated symptoms include arthralgias (multiple). Pertinent negatives include no abdominal pain, chest pain, congestion, coughing, fatigue, fever, headaches, joint swelling, myalgias, nausea, numbness, rash, sore throat, vomiting or weakness.   Hyperlipidemia  This is a chronic problem. The current episode started more than 1 year ago. The problem is controlled. Recent lipid tests were reviewed and are high. Exacerbating diseases include diabetes, hypothyroidism and obesity. Associated symptoms include shortness of breath (on excertion ). Pertinent negatives include no chest pain or myalgias. Current antihyperlipidemic treatment includes statins and herbal therapy. The current treatment provides moderate improvement of lipids. Risk factors for coronary artery disease include dyslipidemia, obesity, post-menopausal and family history.   Hypothyroidism  This is  a chronic problem. The current episode started more than 1 year ago. Associated symptoms include arthralgias (multiple). Pertinent negatives include no abdominal pain, chest pain, congestion, coughing, fatigue, fever, headaches, joint swelling, myalgias, nausea, numbness, rash, sore throat, vomiting or weakness. Treatments tried: levothyroxine 75mcg. The treatment provided moderate relief.       The following portions of the patient's history were reviewed and   updated as appropriate: allergies, current medications, past family history, past medical history, past social history, past surgical history and problem list.     Compared to one year ago, the patient feels her physical   health is the same.    Compared to one year ago, the patient feels her mental   health is the same.    Recent Hospitalizations:  She was not admitted to the hospital during the last year.       Current Medical Providers:  Patient Care Team:  Sade Fishman MD as PCP - Hakan Garcia MD as Consulting Physician (Pain Medicine)    Outpatient Medications Prior to Visit   Medication Sig Dispense Refill   • albuterol (PROVENTIL) (2.5 MG/3ML) 0.083% nebulizer solution Inhale As Needed.     • cetirizine (zyrTEC) 10 MG tablet      • HYDROcodone-acetaminophen (NORCO) 5-325 MG per tablet Take 1 tablet by mouth Daily As Needed for Severe Pain . 30 tablet 0   • Omega-3 Fatty Acids (FISH OIL) 1000 MG capsule capsule Take 1 capsule by mouth 3 (Three) Times a Day.     • Probiotic capsule Take 1 capsule by mouth Daily.     • atorvastatin (LIPITOR) 10 MG tablet TAKE 1 TABLET BY MOUTH EVERY DAY 90 tablet 4   • buPROPion XL (WELLBUTRIN XL) 300 MG 24 hr tablet TAKE 1 TABLET BY MOUTH EVERY DAY 30 tablet 12   • diclofenac (VOLTAREN) 0.1 % ophthalmic solution Administer 1 drop to both eyes 4 (Four) Times a Day. 5 mL 6   • escitalopram (LEXAPRO) 20 MG tablet TAKE 1 TABLET BY MOUTH EVERY DAY 90 tablet 4   • fluticasone (FLONASE) 50 MCG/ACT nasal  spray 2 sprays into the nostril(s) as directed by provider Daily. 16 g 3   • gabapentin (NEURONTIN) 300 MG capsule TAKE 1 CAPSULE BY MOUTH TWICE A DAY 60 capsule 12   • levothyroxine (SYNTHROID, LEVOTHROID) 75 MCG tablet TAKE 1 TABLET BY MOUTH EVERY DAY 30 tablet 12   • meclizine (ANTIVERT) 25 MG tablet Take 1 tablet by mouth 4 (Four) Times a Day. 40 tablet 3   • montelukast (SINGULAIR) 10 MG tablet TAKE 1 TABLET BY MOUTH EVERY DAY 90 tablet 4   • tiZANidine (ZANAFLEX) 4 MG tablet Take 1 tablet by mouth At Night As Needed for Muscle Spasms. 30 tablet 2   • ciprofloxacin-hydrocortisone (Cipro HC) 0.2-1 % otic suspension        No facility-administered medications prior to visit.       Opioid medication/s are on active medication list.  and I have evaluated her active treatment plan and pain score trends (see table).  Vitals:    06/14/22 0811   PainSc: 0-No pain     I have reviewed the chart for potential of high risk medication and harmful drug interactions in the elderly.            Aspirin is not on active medication list.  Aspirin use is contraindicated for this patient due to: allergy.  .    Patient Active Problem List   Diagnosis   • Mixed hyperlipidemia   • Acquired hypothyroidism   • Special screening for malignant neoplasms, colon   • Tubular adenoma of colon   • Screening for malignant neoplasm of cervix   • Irritable colon   • Intervertebral lumbar disc disorder with myelopathy, lumbar region   • Fibrocystic changes of right breast   • Family history of malignant neoplasm of gastrointestinal tract   • Medicare annual wellness visit, subsequent   • Diverticulosis of large intestine without hemorrhage   • Chronic seasonal allergic rhinitis due to pollen   • Asymptomatic menopausal state   • Arthralgia of multiple sites   • Anxiety and depression   • Cervical disc disease   • Encounter for screening mammogram for malignant neoplasm of breast   • Class 1 obesity due to excess calories with serious comorbidity  and body mass index (BMI) of 33.0 to 33.9 in adult   • Family history of dementia   • Insulin resistance   • Drusen of right macula   • Mild cognitive impairment with memory loss   • CAMILA (obstructive sleep apnea)     Advance Care Planning   Advance Directive is not on file.  ACP discussion was held with the patient during this visit. Patient does not have an advance directive, information provided.    A face-to-face visit was completed today with patient.  Counseling explanation, and discussion of advanced directives was performed.   The last advanced care visit was performed in 2021.  In a near life ending situation, from which she is not expected to recover functionally, and she is not able to speak for herself, she does not want life sustaining measures. We discussed feeding tubes, ventilators and cardiac support as well as dialysis.    I spent more than 16 minutes discussing Advanced Care Planning information and documenting in the chart.    Review of Systems   Constitutional: Negative for activity change, appetite change, fatigue, fever and unexpected weight change.   HENT: Negative for congestion, hearing loss, rhinorrhea, sinus pain, sore throat, tinnitus, trouble swallowing and voice change.    Eyes: Negative for visual disturbance.   Respiratory: Positive for shortness of breath (on excertion ). Negative for apnea, cough and wheezing.    Cardiovascular: Negative for chest pain and palpitations.   Gastrointestinal: Negative for abdominal pain, blood in stool, constipation, diarrhea, nausea and vomiting.   Endocrine: Negative for cold intolerance, heat intolerance, polydipsia and polyuria.   Genitourinary: Negative for difficulty urinating, dysuria, flank pain, frequency and hematuria.   Musculoskeletal: Positive for arthralgias (multiple). Negative for joint swelling and myalgias.   Skin: Negative for rash.   Allergic/Immunologic: Negative for environmental allergies and immunocompromised state.  "  Neurological: Negative for dizziness, syncope, weakness, numbness and headaches.   Hematological: Negative for adenopathy. Does not bruise/bleed easily.   Psychiatric/Behavioral: Negative for dysphoric mood and suicidal ideas. The patient is nervous/anxious (improved on meds).        I have reviewed and confirmed the accuracy of the HPI and ROS as documented by the MA/LPN/RN Sade Fishman MD    Reviewed chart for potential of high risk medication in the elderly: yes  Reviewed chart for potential of harmful drug interactions in the elderly:yes       Objective       Vitals:    06/14/22 0811   BP: 128/72   BP Location: Right arm   Patient Position: Sitting   Cuff Size: Adult   Pulse: 83   Resp: 18   Temp: 97.7 °F (36.5 °C)   TempSrc: Temporal   SpO2: 95%  Comment: Room air   Weight: 89.7 kg (197 lb 12.8 oz)   Height: 164.5 cm (64.75\")   PainSc: 0-No pain     BMI Readings from Last 1 Encounters:   06/14/22 33.17 kg/m²   BMI is above normal parameters. Recommendations include: exercise counseling and nutrition counseling    Does the patient have evidence of cognitive impairment? No    Physical Exam  Constitutional:       General: She is not in acute distress.     Appearance: She is well-developed. She is obese.   HENT:      Right Ear: External ear normal.      Left Ear: External ear normal.      Mouth/Throat:      Pharynx: No oropharyngeal exudate.   Eyes:      General:         Right eye: No discharge.         Left eye: No discharge.      Conjunctiva/sclera: Conjunctivae normal.      Pupils: Pupils are equal, round, and reactive to light.   Neck:      Thyroid: No thyromegaly.      Vascular: No JVD.   Cardiovascular:      Rate and Rhythm: Normal rate and regular rhythm.      Pulses:           Carotid pulses are 2+ on the right side and 2+ on the left side.       Femoral pulses are 2+ on the right side and 2+ on the left side.       Dorsalis pedis pulses are 2+ on the right side and 2+ on the left side.      Heart " sounds: Normal heart sounds. No murmur heard.    No friction rub. No gallop.   Pulmonary:      Effort: Pulmonary effort is normal. No respiratory distress.      Breath sounds: Normal breath sounds. No stridor. No wheezing or rales.   Chest:      Chest wall: No tenderness.   Abdominal:      General: Bowel sounds are normal. There is no distension.      Palpations: Abdomen is soft. There is no mass.      Tenderness: There is no abdominal tenderness. There is no guarding or rebound.      Hernia: No hernia is present.   Musculoskeletal:         General: Deformity (knees distala fingers back with decreased ROM) present. Normal range of motion.   Lymphadenopathy:      Cervical: No cervical adenopathy.   Skin:     Findings: No erythema or rash.   Neurological:      Mental Status: She is alert and oriented to person, place, and time.      Cranial Nerves: No cranial nerve deficit.      Sensory: No sensory deficit.      Motor: No abnormal muscle tone.      Coordination: Coordination normal.      Deep Tendon Reflexes: Reflexes normal.   Psychiatric:         Behavior: Behavior normal.         Thought Content: Thought content normal.         Judgment: Judgment normal.       Lab Results   Component Value Date    HGBA1C 6.0 06/14/2022            HEALTH RISK ASSESSMENT    Smoking Status:  Social History     Tobacco Use   Smoking Status Never Smoker   Smokeless Tobacco Never Used     Alcohol Consumption:  Social History     Substance and Sexual Activity   Alcohol Use No     Fall Risk Screen:    STEADI Fall Risk Assessment was completed, and patient is at MODERATE risk for falls. Assessment completed on:6/14/2022    Depression Screening:  PHQ-2/PHQ-9 Depression Screening 6/14/2022   Retired PHQ-9 Total Score -   Retired Total Score -   Little Interest or Pleasure in Doing Things 0-->not at all   Feeling Down, Depressed or Hopeless 3-->nearly every day   Trouble Falling or Staying Asleep, or Sleeping Too Much 0-->not at all   Feeling  Tired or Having Little Energy 2-->more than half the days   Poor Appetite or Overeating 0-->not at all   Feeling Bad about Yourself - or that You are a Failure or Have Let Yourself or Your Family Down 3-->nearly every day   Trouble Concentrating on Things, Such as Reading the Newspaper or Watching Television 3-->nearly every day   Moving or Speaking So Slowly that Other People Could Have Noticed? Or the Opposite - Being So Fidgety 0-->not at all   Thoughts that You Would be Better Off Dead or of Hurting Yourself in Some Way 0-->not at all   PHQ-9: Brief Depression Severity Measure Score 11   If You Checked Off Any Problems, How Difficult Have These Problems Made It For You to Do Your Work, Take Care of Things at Home, or Get Along with Other People? somewhat difficult       Health Habits and Functional and Cognitive Screening:  Functional & Cognitive Status 6/14/2022   Do you have difficulty preparing food and eating? No   Do you have difficulty bathing yourself, getting dressed or grooming yourself? No   Do you have difficulty using the toilet? No   Do you have difficulty moving around from place to place? No   Do you have trouble with steps or getting out of a bed or a chair? No   Current Diet Well Balanced Diet   Dental Exam Not up to date        Dental Exam Comment 5 years ago   Eye Exam Up to date        Eye Exam Comment 05/[facility]22 [facility]   Exercise (times per week) 0 times per week   Current Exercises Include No Regular Exercise   Do you need help using the phone?  No   Are you deaf or do you have serious difficulty hearing?  No   Do you need help with transportation? No   Do you need help shopping? No   Do you need help preparing meals?  No   Do you need help with housework?  No   Do you need help with laundry? No   Do you need help taking your medications? No   Do you need help managing money? No   Do you ever drive or ride in a car without wearing a seat belt? No   Have you felt unusual stress, anger  or loneliness in the last month? No   Who do you live with? Spouse   If you need help, do you have trouble finding someone available to you? No   Have you been bothered in the last four weeks by sexual problems? No   Do you have difficulty concentrating, remembering or making decisions? No       Age-appropriate Screening Schedule:  Refer to the list below for future screening recommendations based on patient's age, sex and/or medical conditions. Orders for these recommended tests are listed in the plan section. The patient has been provided with a written plan.    Health Maintenance   Topic Date Due   • ZOSTER VACCINE (1 of 2) Never done   • DXA SCAN  03/01/2020   • INFLUENZA VACCINE  10/01/2022   • LIPID PANEL  02/28/2023   • MAMMOGRAM  06/23/2024   • TDAP/TD VACCINES (2 - Td or Tdap) 02/11/2026              Assessment & Plan     CMS Preventative Services Quick Reference  Risk Factors Identified During Encounter  Dementia/Memory   Obesity/Overweight   The above risks/problems have been discussed with the patient.  Follow up actions/plans if indicated are seen below in the Assessment/Plan Section.  Pertinent information has been shared with the patient in the After Visit Summary.    Problem List Items Addressed This Visit        High    Anxiety and depression    Overview     Stable continue escitalopram add wellbutrin and follow up in 3 mos           Relevant Medications    buPROPion XL (WELLBUTRIN XL) 300 MG 24 hr tablet    escitalopram (LEXAPRO) 20 MG tablet    Insulin resistance    Overview     A1c 6.5 01/2022 Diet and exercise discussed. It's importance is understood.   A1c 6.0 and improved.            Mild cognitive impairment with memory loss    Overview     Negative findings on neurologic consult. Concern for mild cognitive impairment. Mini status exam normal              Medium    Acquired hypothyroidism    Overview     No sxs compliant TSH today.           Relevant Medications    levothyroxine (SYNTHROID,  LEVOTHROID) 75 MCG tablet    CAMILA (obstructive sleep apnea)    Overview     Stable on CPAP              Low    Mixed hyperlipidemia    Overview     decreased HDL, 31,  Compliant with Statin Rx           Current Assessment & Plan     Lipid abnormalities are improving with treatment.  Pharmacotherapy as ordered.  Lipids will be reassessed in 3 months.           Relevant Medications    atorvastatin (LIPITOR) 10 MG tablet    Other Relevant Orders    POCT urinalysis dipstick, manual (Completed)    Tubular adenoma of colon    Overview     colonsocpy 03/13/2018, repeat  2023           Intervertebral lumbar disc disorder with myelopathy, lumbar region    Overview     Presently stable on prn OTC  anti inflammatories which are continued           Diverticulosis of large intestine without hemorrhage    Overview     No sxs distant hx of diverticulitis  Ascending colon, Colonscopy, 2012           Chronic seasonal allergic rhinitis due to pollen    Overview     with reactive airways no sxs presently on meds.            Relevant Medications    fluticasone (FLONASE) 50 MCG/ACT nasal spray    montelukast (SINGULAIR) 10 MG tablet    Class 1 obesity due to excess calories with serious comorbidity and body mass index (BMI) of 33.0 to 33.9 in adult    Overview     Healthy diet and regular exercise and wt loss encouraged.            Current Assessment & Plan     Patient's (Body mass index is 33.17 kg/m².) indicates that they are obese (BMI >30) with health conditions that include dyslipidemias . Weight is worsening. BMI is is above average; BMI management plan is completed. We discussed portion control and increasing exercise.               Unprioritized    Special screening for malignant neoplasms, colon    Overview     Last colonoscopy 03/12/2018. Repeat 2023           Screening for malignant neoplasm of cervix    Overview     Last pap smear 03/13/2015. WNL  S/P SNEHAL with BSO for menorrhagia and ovarian cysts           Family history of  malignant neoplasm of gastrointestinal tract    Overview     Father  of colon cancer at 65,  and PGM, and mother           Medicare annual wellness visit, subsequent - Primary    Overview     Diet exercise breast self exams, seat belts, sunscreens, fall prevention and general safety discused and encouraged. Previous lab reviewed. We will notify her today's results           Asymptomatic menopausal state    Overview     Last bone dexa scan is 2018. WNL Repeat            Arthralgia of multiple sites    Overview     Sx s are stable on prn OTC meds.            Relevant Medications    tiZANidine (ZANAFLEX) 4 MG tablet    Cervical disc disease    Overview     Multiple level mild disease with neural foramina narrowing 2020  MRI Minimal improvement with steroids. PT and pain management. Begin gabapentin and follow pending referral              Encounter for screening mammogram for malignant neoplasm of breast    Overview     Last mammogram 2020           Relevant Orders    Mammo Screening Digital Tomosynthesis Bilateral With CAD (Completed)    Family history of dementia    Overview     Moter and aunt in their 80's             Other Visit Diagnoses     Elevated glucose        Relevant Orders    POCT Glucose (Completed)    POC Glycosylated Hemoglobin (Hb A1C) (Completed)    Allergic conjunctivitis of both eyes        Relevant Medications    diclofenac (VOLTAREN) 0.1 % ophthalmic solution    Vertigo, benign paroxysmal, bilateral        Rx allergies meclazine and follow up should sxs not improve.     Relevant Medications    meclizine (ANTIVERT) 25 MG tablet           Follow Up:   Return in about 1 year (around 2023).     An After Visit Summary and PPPS were given to the patient.

## 2022-06-14 NOTE — ASSESSMENT & PLAN NOTE
Patient's (Body mass index is 33.17 kg/m².) indicates that they are obese (BMI >30) with health conditions that include dyslipidemias . Weight is worsening. BMI is is above average; BMI management plan is completed. We discussed portion control and increasing exercise.

## 2022-06-23 ENCOUNTER — HOSPITAL ENCOUNTER (OUTPATIENT)
Dept: MAMMOGRAPHY | Facility: HOSPITAL | Age: 67
Discharge: HOME | End: 2022-06-23
Admitting: FAMILY MEDICINE

## 2022-06-23 DIAGNOSIS — Z12.31 ENCOUNTER FOR SCREENING MAMMOGRAM FOR MALIGNANT NEOPLASM OF BREAST: ICD-10-CM

## 2022-06-23 PROCEDURE — 77063 BREAST TOMOSYNTHESIS BI: CPT

## 2022-06-23 PROCEDURE — 77067 SCR MAMMO BI INCL CAD: CPT

## 2022-06-25 PROBLEM — G31.84 MILD COGNITIVE IMPAIRMENT WITH MEMORY LOSS: Status: ACTIVE | Noted: 2022-04-29

## 2022-07-26 ENCOUNTER — TELEPHONE (OUTPATIENT)
Dept: NEUROLOGY | Facility: CLINIC | Age: 67
End: 2022-07-26

## 2022-07-26 DIAGNOSIS — G47.33 OBSTRUCTIVE SLEEP APNEA: Primary | ICD-10-CM

## 2022-07-26 NOTE — TELEPHONE ENCOUNTER
----- Message from Joseph F Seipel, MD sent at 6/8/2022  6:18 PM EDT -----  Home sleep test completed set up on auto CPAP

## 2022-08-17 ENCOUNTER — TELEPHONE (OUTPATIENT)
Dept: NEUROLOGY | Facility: CLINIC | Age: 67
End: 2022-08-17

## 2022-09-09 ENCOUNTER — TELEPHONE (OUTPATIENT)
Dept: NEUROLOGY | Facility: CLINIC | Age: 67
End: 2022-09-09

## 2022-09-14 ENCOUNTER — OFFICE VISIT (OUTPATIENT)
Dept: FAMILY MEDICINE CLINIC | Facility: CLINIC | Age: 67
End: 2022-09-14

## 2022-09-14 VITALS
DIASTOLIC BLOOD PRESSURE: 60 MMHG | HEART RATE: 89 BPM | OXYGEN SATURATION: 98 % | SYSTOLIC BLOOD PRESSURE: 110 MMHG | TEMPERATURE: 97.5 F | BODY MASS INDEX: 33.49 KG/M2 | WEIGHT: 201 LBS | RESPIRATION RATE: 18 BRPM | HEIGHT: 65 IN

## 2022-09-14 DIAGNOSIS — R73.09 ELEVATED GLUCOSE: ICD-10-CM

## 2022-09-14 DIAGNOSIS — E88.81 INSULIN RESISTANCE: Primary | ICD-10-CM

## 2022-09-14 DIAGNOSIS — E78.2 MIXED HYPERLIPIDEMIA: ICD-10-CM

## 2022-09-14 DIAGNOSIS — G31.84 MILD COGNITIVE IMPAIRMENT WITH MEMORY LOSS: ICD-10-CM

## 2022-09-14 DIAGNOSIS — E66.09 CLASS 1 OBESITY DUE TO EXCESS CALORIES WITH SERIOUS COMORBIDITY AND BODY MASS INDEX (BMI) OF 33.0 TO 33.9 IN ADULT: ICD-10-CM

## 2022-09-14 LAB
BILIRUB BLD-MCNC: NEGATIVE MG/DL
CLARITY, POC: CLEAR
COLOR UR: YELLOW
EXPIRATION DATE: NORMAL
GLUCOSE BLDC GLUCOMTR-MCNC: 123 MG/DL (ref 70–130)
GLUCOSE UR STRIP-MCNC: NEGATIVE MG/DL
HBA1C MFR BLD: 5.9 %
KETONES UR QL: NEGATIVE
LEUKOCYTE EST, POC: NEGATIVE
Lab: NORMAL
NITRITE UR-MCNC: NEGATIVE MG/ML
PH UR: 6 [PH] (ref 5–8)
PROT UR STRIP-MCNC: NEGATIVE MG/DL
RBC # UR STRIP: NEGATIVE /UL
SP GR UR: 1.02 (ref 1–1.03)
UROBILINOGEN UR QL: NORMAL

## 2022-09-14 PROCEDURE — 82962 GLUCOSE BLOOD TEST: CPT | Performed by: FAMILY MEDICINE

## 2022-09-14 PROCEDURE — 81002 URINALYSIS NONAUTO W/O SCOPE: CPT | Performed by: FAMILY MEDICINE

## 2022-09-14 PROCEDURE — 99214 OFFICE O/P EST MOD 30 MIN: CPT | Performed by: FAMILY MEDICINE

## 2022-09-14 NOTE — PROGRESS NOTES
Chief Complaint  Blood sugar problem, Hyperlipidemia, and Obesity    Subjective     CC  Problem List  Visit Diagnosis   Encounters  Notes  Medications  Labs  Result Review Imaging  Media    Genevieve Kuo presents to [facility] for   Blood Sugar Problem  This is a new problem. The current episode started more than 1 month ago. Pertinent negatives include no abdominal pain, chest pain, fatigue, nausea, rash, visual change, vomiting or weakness. Associated symptoms comments: Genevieve has not been checking her blood sugars at home. She is watching her sugars in her diet and using artifical sugars. [facility] exam was [facility].. Treatments tried: Diet and exercise. The treatment provided moderate relief.   Hyperlipidemia  This is a chronic problem. The current episode started more than 1 year ago. The problem is uncontrolled. Recent lipid tests were reviewed and are high. Exacerbating diseases include diabetes and obesity. Pertinent negatives include no chest pain or shortness of breath. Current antihyperlipidemic treatment includes statins and herbal therapy. The current treatment provides moderate improvement of lipids. Risk factors for coronary artery disease include dyslipidemia, post-menopausal and obesity.   Memory Loss  This is a chronic problem. The current episode started more than 1 year ago. The problem occurs constantly. The problem has been gradually worsening. Pertinent negatives include no abdominal pain, chest pain, fatigue, nausea, rash, visual change, vomiting or weakness. Associated symptoms comments: She is followed with neurology. She feels that her sxs have worsened some since last visit.       Review of Systems   Constitutional: Negative for appetite change, fatigue and unexpected weight loss.   Eyes: Negative for visual disturbance.   Respiratory: Negative for shortness of breath.    Cardiovascular: Negative for chest pain and palpitations.  "  Gastrointestinal: Negative for abdominal pain, constipation, diarrhea, nausea and vomiting.   Endocrine: Negative for cold intolerance, heat intolerance, polydipsia and polyuria.   Skin: Negative for rash.   Neurological: Positive for memory problem. Negative for weakness.   Hematological: Negative for adenopathy. Does not bruise/bleed easily.        Objective   Vital Signs:   /60 (BP Location: Right arm, Patient Position: Sitting, Cuff Size: Adult)   Pulse 89   Temp 97.5 °F (36.4 °C) (Temporal)   Resp 18   Ht 164.5 cm (64.75\")   Wt 91.2 kg (201 lb)   SpO2 98% Comment: Room air  BMI 33.71 kg/m²     Physical Exam  Constitutional:       General: She is not in acute distress.  Cardiovascular:      Rate and Rhythm: Normal rate and regular rhythm.      Heart sounds: No murmur heard.  Pulmonary:      Breath sounds: Normal breath sounds. No wheezing.   Musculoskeletal:      Cervical back: Neck supple.   Lymphadenopathy:      Cervical: No cervical adenopathy.   Skin:     Findings: No rash.   Neurological:      Mental Status: She is alert.        Result Review :Labs  Result Review  Imaging  Med Tab  Media                 Assessment and Plan CC Problem List  Visit Diagnosis  ROS  Review (Popup)  Health Maintenance  Quality  BestPractice  Medications  SmartSets  SnapShot Encounters  Media  Problem List Items Addressed This Visit        High    Insulin resistance - Primary    Overview     A1c 5.9 08/14/2022 continued improvement continue healthy diet and exercise  A1c 6.0  06/14/2022  A1c 6.5 01/2022 Diet and exercise discussed. It's importance is understood.             Relevant Orders    POCT urinalysis dipstick, manual (Completed)    POC Glycosylated Hemoglobin (Hb A1C) (Completed)    POCT Glucose (Completed)    Mild cognitive impairment with memory loss    Overview     Negative findings on neurologic consult. Concern for mild cognitive impairment. Mini status exam normal 06/22. She feels she " is having decline 09/2022. Continue neurologic follow up            Low    Mixed hyperlipidemia    Overview     decreased HDL, 31,  Compliant with Statin Rx         Current Assessment & Plan     Lipid abnormalities are improving with treatment.  Pharmacotherapy as ordered.  Lipids will be reassessed in 3 months.         Class 1 obesity due to excess calories with serious comorbidity and body mass index (BMI) of 33.0 to 33.9 in adult    Overview     Healthy diet and regular exercise and wt loss encouraged.          Current Assessment & Plan     Patient's (Body mass index is 33.71 kg/m².) indicates that they are obese (BMI >30) with health conditions that include dyslipidemias . Weight is unchanged. BMI is is above average; BMI management plan is completed. We discussed low calorie, low carb based diet program, portion control and increasing exercise.            Other Visit Diagnoses     Elevated glucose        Relevant Orders    POCT urinalysis dipstick, manual (Completed)    POC Glycosylated Hemoglobin (Hb A1C) (Completed)    POCT Glucose (Completed)          Follow Up Instructions Charge Capture  Follow-up Communications  Return in about 3 months (around 12/14/2022).  Patient was given instructions and counseling regarding her condition or for health maintenance advice. Please see specific information pulled into the AVS if appropriate.

## 2022-09-15 NOTE — ASSESSMENT & PLAN NOTE
Patient's (Body mass index is 33.71 kg/m².) indicates that they are obese (BMI >30) with health conditions that include dyslipidemias . Weight is unchanged. BMI is is above average; BMI management plan is completed. We discussed low calorie, low carb based diet program, portion control and increasing exercise.

## 2022-10-28 ENCOUNTER — TELEPHONE (OUTPATIENT)
Dept: NEUROLOGY | Facility: CLINIC | Age: 67
End: 2022-10-28

## 2022-10-28 DIAGNOSIS — R41.3 MEMORY LOSS: Primary | ICD-10-CM

## 2022-10-28 RX ORDER — DONEPEZIL HYDROCHLORIDE 5 MG/1
5 TABLET, FILM COATED ORAL NIGHTLY
Qty: 30 TABLET | Refills: 2 | Status: SHIPPED | OUTPATIENT
Start: 2022-10-28 | End: 2022-11-21

## 2022-10-28 NOTE — TELEPHONE ENCOUNTER
Caller:  MINH GONZALEZ    Relationship: DAUGHTER, NOT ON  [employer]    Best call back number: [phone]    What is your medical concern? PATIENT'S DAUGHTER CALLED, STATES THAT THE PATIENT'S DEMENTIA SYMPTOMS ARE WORSENING AND THAT SHE IS EXPERIENCING TREMORS.    DR. SEIPEL DISCUSSED THE POSSIBILITY OF PRESCRIBING ARICEPT AT A LATER TIME, DAUGHTER FEELS PATIENT COULD BENEFIT FROM THE ARICEPT    PLEASE CALL PATIENT'S , LAVONNE LANDON -[phone], HE IS ON THE  VERBAL    THANK YOU    How long has this issue been going on? 3 WEEKS, WHOLE BODY TREMORS STARTED 2 DAYS AGO    Is your provider already aware of this issue? YES    Have you been treated for this issue? YES

## 2022-11-04 ENCOUNTER — OFFICE VISIT (OUTPATIENT)
Dept: PAIN MEDICINE | Facility: CLINIC | Age: 67
End: 2022-11-04

## 2022-11-04 VITALS
HEART RATE: 75 BPM | DIASTOLIC BLOOD PRESSURE: 76 MMHG | SYSTOLIC BLOOD PRESSURE: 119 MMHG | RESPIRATION RATE: 16 BRPM | OXYGEN SATURATION: 96 %

## 2022-11-04 DIAGNOSIS — M46.1 BILATERAL SACROILIITIS: Primary | ICD-10-CM

## 2022-11-04 DIAGNOSIS — M25.50 ARTHRALGIA OF MULTIPLE SITES: ICD-10-CM

## 2022-11-04 PROCEDURE — 99214 OFFICE O/P EST MOD 30 MIN: CPT | Performed by: STUDENT IN AN ORGANIZED HEALTH CARE EDUCATION/TRAINING PROGRAM

## 2022-11-04 PROCEDURE — G0463 HOSPITAL OUTPT CLINIC VISIT: HCPCS | Performed by: STUDENT IN AN ORGANIZED HEALTH CARE EDUCATION/TRAINING PROGRAM

## 2022-11-04 RX ORDER — HYDROCODONE BITARTRATE AND ACETAMINOPHEN 5; 325 MG/1; MG/1
1 TABLET ORAL DAILY PRN
Qty: 30 TABLET | Refills: 0 | Status: SHIPPED | OUTPATIENT
Start: 2022-11-04 | End: 2022-11-07 | Stop reason: SDUPTHER

## 2022-11-04 NOTE — PROGRESS NOTES
CHIEF COMPLAINT  Chief Complaint   Patient presents with   • Back Pain     F/u with bilateral Sacroiliitis No Narcotics         Primary Care  Sravanthi, Sade POLK MD    Subjective   Genevieve Kuo is a 67 y.o. female  who presents for chronic radicular neck pain as well as right shoulder pain.  She states that she is had longstanding pain in the neck as she used to work in a restaurant waiting tables carry heavy trays.  She states that most recently, she was helping to lift a calf and felt her shoulder pull and since that time has had continued pain.  She is currently working with physical therapy which offers her minimal benefit.  She will occasionally take Tylenol, but is allergic to NSAIDs.  She denies any significant numbness or weakness in the hands describes it primarily as a sharp, stabbing pain into the shoulder as well as aching occasional stabbing pains in the neck.    Back Pain  Pertinent negatives include no numbness or weakness.   Sciatica  Associated symptoms include neck pain. Pertinent negatives include no numbness or weakness.        Location: Neck, right shoulder  Onset: The neck many years ago, the right shoulder approximately a year ago  Duration: Progressively worsening  Timing: Constant throughout the day  Quality: Sharp, stabbing pains with occasional burning sensations  Severity: Today: 7       Last Week: 7       Worst: 7  Modifying Factors: The pain is worse with any type of physical activity and moving and lifting.  Pain is slightly improved with rest and acetaminophen and TENS unit    Physical Therapy: yes    Interval Update 11/04/2022: She reports that she has had a recurrence of pain in the buttock and low back.  Also requesting refill hydrocodone.    The following portions of the patient's history were reviewed and updated as appropriate: allergies, current medications, past family history, past medical history, past social history, past surgical history and problem list.      Current  Outpatient Medications:   •  albuterol (PROVENTIL) (2.5 MG/3ML) 0.083% nebulizer solution, Inhale As Needed., Disp: , Rfl:   •  atorvastatin (LIPITOR) 10 MG tablet, Take 1 tablet by mouth Daily., Disp: 90 tablet, Rfl: 4  •  buPROPion XL (WELLBUTRIN XL) 300 MG 24 hr tablet, Take 1 tablet by mouth Daily., Disp: 90 tablet, Rfl: 3  •  cetirizine (zyrTEC) 10 MG tablet, , Disp: , Rfl:   •  diclofenac (VOLTAREN) 0.1 % ophthalmic solution, Administer 1 drop to both eyes 4 (Four) Times a Day., Disp: 5 mL, Rfl: 6  •  donepezil (Aricept) 5 MG tablet, Take 1 tablet by mouth Every Night., Disp: 30 tablet, Rfl: 2  •  escitalopram (LEXAPRO) 20 MG tablet, Take 1 tablet by mouth Daily., Disp: 90 tablet, Rfl: 4  •  fluticasone (FLONASE) 50 MCG/ACT nasal spray, 2 sprays into the nostril(s) as directed by provider Daily., Disp: 16 g, Rfl: 3  •  gabapentin (NEURONTIN) 300 MG capsule, Take 1 capsule by mouth 2 (Two) Times a Day., Disp: 180 capsule, Rfl: 3  •  HYDROcodone-acetaminophen (NORCO) 5-325 MG per tablet, Take 1 tablet by mouth Daily As Needed for Severe Pain., Disp: 30 tablet, Rfl: 0  •  levothyroxine (SYNTHROID, LEVOTHROID) 75 MCG tablet, Take 1 tablet by mouth Daily., Disp: 90 tablet, Rfl: 3  •  meclizine (ANTIVERT) 25 MG tablet, Take 1 tablet by mouth 4 (Four) Times a Day., Disp: 40 tablet, Rfl: 3  •  montelukast (SINGULAIR) 10 MG tablet, Take 1 tablet by mouth Daily., Disp: 90 tablet, Rfl: 4  •  Omega-3 Fatty Acids (FISH OIL) 1000 MG capsule capsule, Take 1 capsule by mouth 3 (Three) Times a Day., Disp: , Rfl:   •  Probiotic capsule, Take 1 capsule by mouth Daily., Disp: , Rfl:   •  tiZANidine (ZANAFLEX) 4 MG tablet, Take 1 tablet by mouth At Night As Needed for Muscle Spasms., Disp: 30 tablet, Rfl: 2    Review of Systems   Musculoskeletal: Positive for back pain, neck pain and neck stiffness.        Right shoulder pain   Neurological: Negative for weakness and numbness.       Vitals:    11/04/22 1435   BP: 119/76   Pulse:  75   Resp: 16   SpO2: 96%   PainSc:   7       Objective   Physical Exam  Vitals and nursing note reviewed.   Constitutional:       General: She is not in acute distress.     Appearance: Normal appearance. She is well-developed. She is obese.   Neck:      Trachea: No tracheal deviation.      Comments: Cervical spine exam:  1.  Minimally tender bilateral paraspinal musculature  2.  Minimally tender bilateral right greater than left cervical facets  3.  Cervical facet loading weakly positive on the right  4.  Spurling positive bilaterally  Musculoskeletal:      Right shoulder: Tenderness present. No swelling. Decreased range of motion. Decreased strength.      Comments: Lumbar Spine Exam:  Tender to palpation over the lumbar paraspinal musculature Yes  Limited range of motion secondary to pain Yes  Facet loading positive: bilateral  Facets tender to palpation: bilateral  Straight leg raise test positive: Negative    SI joint exam:  1.  Tender to palpation bilateral SI joints  2.  SI compression positive bilaterally  3.  Edil weakly positive bilaterally  4.  PSIS tender on the left       Neurological:      General: No focal deficit present.      Mental Status: She is alert and oriented to person, place, and time.           Assessment & Plan   Problems Addressed this Visit        Other    Arthralgia of multiple sites    Relevant Medications    HYDROcodone-acetaminophen (NORCO) 5-325 MG per tablet   Other Visit Diagnoses     Bilateral sacroiliitis (HCC)    -  Primary    Relevant Orders    SI Joint Injection      Diagnoses       Codes Comments    Bilateral sacroiliitis (HCC)    -  Primary ICD-10-CM: M46.1  ICD-9-CM: 720.2     Arthralgia of multiple sites     ICD-10-CM: M25.50  ICD-9-CM: 719.49           Plan:  1. Plan to repeat bilateral SI joint injections as she previously did very well.  May also consider repeating lumbar RFA  2. Refill rare hydrocodone 5 mg  3. Inspect appropriate    --- Follow-up next available for  bilateral SI joint injection           INSPECT REPORT    As part of the patient's treatment plan, I may be prescribing controlled substances. The patient has been made aware of appropriate use of such medications, including potential risk of somnolence, limited ability to drive and/or work safely, and the potential for dependence or overdose. It has also bee made clear that these medications are for use by this patient only, without concomitant use of alcohol or other substances unless prescribed.     Patient has completed prescribing agreement detailing terms of continued prescribing of controlled substances, including monitoring ALYCE reports, urine drug screening, and pill counts if necessary. The patient is aware that inappropriate use will results in cessation of prescribing such medications.    INSPECT report has been reviewed and scanned into the patient's chart.    As the clinician, I personally reviewed the INSPECT from 11/2/2022.    History and physical exam exhibit continued safe and appropriate use of controlled substances.      EMR Dragon/Transcription disclaimer:   Much of this encounter note is an electronic transcription/translation of spoken language to printed text. The electronic translation of spoken language may permit erroneous, or at times, nonsensical words or phrases to be inadvertently transcribed; Although I have reviewed the note for such errors, some may still exist.

## 2022-11-07 DIAGNOSIS — M25.50 ARTHRALGIA OF MULTIPLE SITES: ICD-10-CM

## 2022-11-07 NOTE — TELEPHONE ENCOUNTER
Pt's insurance will only cover a 7 day supply of hydrocodone without a pa.  Can you send in another rx for her and i'll do a pa.  I know she just uses them prn but I figure if we can get a pa on file it will be easier for her to fill next time.  I started the refill, but I didn't know if you wanted to send a remainder rx or just one for #30, so I didn't change the quantity.  Inspect in chart

## 2022-11-08 RX ORDER — HYDROCODONE BITARTRATE AND ACETAMINOPHEN 5; 325 MG/1; MG/1
1 TABLET ORAL DAILY PRN
Qty: 30 TABLET | Refills: 0 | Status: SHIPPED | OUTPATIENT
Start: 2022-11-08 | End: 2023-04-03

## 2022-11-10 ENCOUNTER — HOSPITAL ENCOUNTER (OUTPATIENT)
Dept: GENERAL RADIOLOGY | Facility: HOSPITAL | Age: 67
Discharge: HOME | End: 2022-11-10

## 2022-11-10 ENCOUNTER — HOSPITAL ENCOUNTER (OUTPATIENT)
Dept: PAIN MEDICINE | Facility: HOSPITAL | Age: 67
Discharge: HOME | End: 2022-11-10

## 2022-11-10 VITALS
HEART RATE: 65 BPM | RESPIRATION RATE: 16 BRPM | TEMPERATURE: 96.9 F | SYSTOLIC BLOOD PRESSURE: 110 MMHG | DIASTOLIC BLOOD PRESSURE: 74 MMHG | OXYGEN SATURATION: 95 %

## 2022-11-10 DIAGNOSIS — M46.1 BILATERAL SACROILIITIS: Primary | ICD-10-CM

## 2022-11-10 DIAGNOSIS — R52 PAIN: ICD-10-CM

## 2022-11-10 PROCEDURE — 27096 INJECT SACROILIAC JOINT: CPT | Performed by: STUDENT IN AN ORGANIZED HEALTH CARE EDUCATION/TRAINING PROGRAM

## 2022-11-10 PROCEDURE — 25010000002 METHYLPREDNISOLONE PER 40 MG: Performed by: STUDENT IN AN ORGANIZED HEALTH CARE EDUCATION/TRAINING PROGRAM

## 2022-11-10 PROCEDURE — 0 IOPAMIDOL 41 % SOLUTION: Performed by: STUDENT IN AN ORGANIZED HEALTH CARE EDUCATION/TRAINING PROGRAM

## 2022-11-10 RX ORDER — BUPIVACAINE HYDROCHLORIDE 2.5 MG/ML
10 INJECTION, SOLUTION EPIDURAL; INFILTRATION; INTRACAUDAL ONCE
Status: COMPLETED | OUTPATIENT
Start: 2022-11-10 | End: 2022-11-10

## 2022-11-10 RX ORDER — METHYLPREDNISOLONE ACETATE 40 MG/ML
40 INJECTION, SUSPENSION INTRA-ARTICULAR; INTRALESIONAL; INTRAMUSCULAR; SOFT TISSUE ONCE
Status: COMPLETED | OUTPATIENT
Start: 2022-11-10 | End: 2022-11-10

## 2022-11-10 RX ADMIN — IOPAMIDOL 3 ML: 408 INJECTION, SOLUTION INTRATHECAL at 15:00

## 2022-11-10 RX ADMIN — BUPIVACAINE HYDROCHLORIDE 10 ML: 2.5 INJECTION, SOLUTION EPIDURAL; INFILTRATION; INTRACAUDAL; PERINEURAL at 15:00

## 2022-11-10 RX ADMIN — METHYLPREDNISOLONE ACETATE 40 MG: 40 INJECTION, SUSPENSION INTRA-ARTICULAR; INTRALESIONAL; INTRAMUSCULAR; SOFT TISSUE at 15:00

## 2022-11-10 NOTE — PROCEDURES
Bilateral Sacroiliac Joint Injection  [facility]      PREOPERATIVE DIAGNOSIS:   Sacroiliac joint dysfunction, bilateral    POSTOPERATIVE DIAGNOSIS:  Sacroiliac joint dysfunction, bilateral    PROCEDURE:  Sacroiliac Joint Injection, Bilaterally, with fluoroscopic guidance    PRE-PROCEDURE DISCUSSION WITH PATIENT:    Risks and complications were discussed with the patient prior to starting the procedure and informed consent was obtained.  We discussed various topics including but not limited to bleeding, infection, injury, postprocedural site soreness, painful flareup, worsening of clinical picture, paralysis, coma, and death.     SURGEON:  Hakan Bolivar MD    REASON FOR PROCEDURE:    Patient has pain consistent with SI pathology on history and physical exam. Positive sacroiliac provocation maneuvers noted.   Tender to palpation over the affected SI joint. Positive FABERE.     SEDATION:  Patient declined administration of moderate sedation    ANESTHETIC AGENT:  Marcaine 0.25%  STEROID AGENT:  Methylprednisolone (DEPO MEDROL) 40mg/ml    DESCRIPTON OF PROCEDURE:  After obtaining informed consent, IV access was not obtained in the preoperative area.  The patient was transported to the operative suite and placed in the prone position with a pillow under the pelvic area.  The lumbosacral area was prepped with Chloraprep and draped in a sterile fashion. Under fluoroscopic guidance the inferior most portion of the right SI joint was identified. The overlying skin and subcutaneous tissue was anesthetized with 1% lidocaine. A 25-gauge spinal needle was introduced from the inferior most portion of the joint into the RIGHT SI joint under fluoroscopic guidance in the AP dimension with slight oblique rotation to the contralateral side.  Aspiration was negative.  After confirming the position of the needle with fluoroscopy, 1 mL of Omnipaque was injected and after seeing appropriate spread into the joint a total of 4mL  Marcaine, with the steroid, was injected very slowly.  Needle was removed intact.  A similar procedure was performed on the LEFT side.   Vital signs remained stable.  The onset of analgesia was noted.      ESTIMATED BLOOD LOSS:  minimal  SPECIMENS:  None  COMPLICATIONS:  No complications were noted., There was no indication of vascular uptake on live injection of contrast dye. and There was no indication of intrathecal uptake on live injection of contrast dye.    TOLERANCE & DISCHARGE CONDITION:    The patient tolerated the procedure well.  The patient was transported to the recovery area without difficulties.  The patient was discharged to home under the care of family in stable and satisfactory condition.    PLAN OF CARE:  1. The patient was given our standard instruction sheet and will resume all medications as per the medication reconciliation sheet.  2. The patient will Return to clinic PRN.  3. The patient is instructed to keep a pain log hourly for 8 hours after the procedure.

## 2022-11-20 DIAGNOSIS — R41.3 MEMORY LOSS: ICD-10-CM

## 2022-11-21 RX ORDER — DONEPEZIL HYDROCHLORIDE 5 MG/1
TABLET, FILM COATED ORAL
Qty: 30 TABLET | Refills: 2 | Status: SHIPPED | OUTPATIENT
Start: 2022-11-21 | End: 2022-12-05 | Stop reason: SDUPTHER

## 2022-12-05 ENCOUNTER — OFFICE VISIT (OUTPATIENT)
Dept: NEUROLOGY | Facility: CLINIC | Age: 67
End: 2022-12-05

## 2022-12-05 VITALS
SYSTOLIC BLOOD PRESSURE: 117 MMHG | WEIGHT: 201 LBS | BODY MASS INDEX: 33.49 KG/M2 | TEMPERATURE: 94.5 F | DIASTOLIC BLOOD PRESSURE: 79 MMHG | HEART RATE: 71 BPM | HEIGHT: 65 IN

## 2022-12-05 DIAGNOSIS — G47.52 REM SLEEP BEHAVIOR DISORDER: ICD-10-CM

## 2022-12-05 DIAGNOSIS — G47.33 OSA (OBSTRUCTIVE SLEEP APNEA): Primary | ICD-10-CM

## 2022-12-05 DIAGNOSIS — G31.83 MILD LEWY BODY DEMENTIA, UNSPECIFIED WHETHER BEHAVIORAL, PSYCHOTIC, OR MOOD DISTURBANCE OR ANXIETY: ICD-10-CM

## 2022-12-05 DIAGNOSIS — F02.A0 MILD LEWY BODY DEMENTIA, UNSPECIFIED WHETHER BEHAVIORAL, PSYCHOTIC, OR MOOD DISTURBANCE OR ANXIETY: ICD-10-CM

## 2022-12-05 PROCEDURE — 99214 OFFICE O/P EST MOD 30 MIN: CPT | Performed by: PSYCHIATRY & NEUROLOGY

## 2022-12-05 RX ORDER — DONEPEZIL HYDROCHLORIDE 10 MG/1
5 TABLET, FILM COATED ORAL
Qty: 180 TABLET | Refills: 3 | Status: SHIPPED | OUTPATIENT
Start: 2022-12-05 | End: 2023-03-20 | Stop reason: SDUPTHER

## 2022-12-05 NOTE — PROGRESS NOTES
Chief Complaint  Sleep Apnea    Subjective          Genevieve Kuo presents to [facility]  History of Present Illness  30-90 day CAMILA f/u patient states she is benefiting from pap therapy, she uses full face mask, she would like a different kind of masks,  and goes through [place] for supplies.    Sleep testing history:    On NPSG at [facility] , 6/6/22 patient had Mild obstructive sleep apnea syndrome with apnea-hypopnea index of 12.2 per sleep hour, minimum SpO2 of 80%    PAP download:  The patient is on CPAP therapy at 5-15 cm/H2O. 77% usage for more than 4 hours with an average usage of 5 hours 54 minutes. AHI down to 3.3 .  Average pressures 7.5.  Average large leak 33LPM.     The patient's hypersomnia has stayed the same       [place] Sleepiness Scale:  Sitting and reading 3 WatchingTV 3  Sitting, inactive, in a public place 3  As a passenger in a car for 1 hour w/o a break  3  Lying down to rest in the afternoon  3  Sitting and talking to someone  1  Sitting quietly after a lunch  3  In a car, while stopped for traffic or a light  0  Total 20    Difficulty cooking due to not remembering what have already put in... forgets words when speaking, sees silhouettes of people walking. Once a train through the bathroom.     Has difficult with sleep onset    Labs were normal and mri brain unremarkable for evaluation of memory, has been taking aricept 5    Review of Systems   Constitutional: Positive for fatigue. Negative for fever.   HENT: Positive for hearing loss and tinnitus.    Eyes: Positive for itching. Negative for pain.   Respiratory: Positive for shortness of breath. Negative for cough.    Gastrointestinal: Negative.    Endocrine: Negative.    Genitourinary: Positive for frequency and urgency.   Musculoskeletal: Positive for back pain and neck stiffness.   Neurological: Positive for dizziness, speech difficulty and headaches.   Psychiatric/Behavioral: Positive for confusion, decreased  "concentration, hallucinations and sleep disturbance.         Objective   Vital Signs:   /79 (BP Location: Left arm, Patient Position: Sitting, Cuff Size: Adult)   Pulse 71   Temp 94.5 °F (34.7 °C)   Ht 164.5 cm (64.75\")   Wt 91.2 kg (201 lb)   BMI 33.71 kg/m²     Physical Exam  Vitals reviewed.   Constitutional:       Appearance: Normal appearance.   Eyes:      Conjunctiva/sclera: Conjunctivae normal.   Pulmonary:      Effort: Pulmonary effort is normal. No respiratory distress.   Neurological:      General: No focal deficit present.      Mental Status: She is alert and oriented to person, place, and time.   Psychiatric:         Mood and Affect: Mood normal.         Behavior: Behavior normal.         Thought Content: Thought content normal.        Result Review :                 Assessment and Plan    Diagnoses and all orders for this visit:    1. CAMILA (obstructive sleep apnea) (Primary)  -     PAP Therapy    2. REM sleep behavior disorder    3. Mild Lewy body dementia, unspecified whether behavioral, psychotic, or mood disturbance or anxiety (HCC)    Other orders  -     donepezil (ARICEPT) 10 MG tablet; Take 0.5 tablets by mouth every night at bedtime.  Dispense: 180 tablet; Refill: 3      Continue CPAP at current pressure   The patient is compliant with and benefiting from PAP therapy.    Melatonin for the REM behavior    Probable Lewy body dementia due to th REM behavior, hallucinations and variable mental status      Follow Up   Return in about 6 months (around 6/5/2023).    Patient was given instructions and counseling regarding her condition or for health maintenance advice. Please see specific information pulled into the AVS if appropriate.       This document has been electronically signed by Joseph Seipel, MD on March 20, 2023 17:39 EDT  "

## 2022-12-12 ENCOUNTER — TELEPHONE (OUTPATIENT)
Dept: FAMILY MEDICINE CLINIC | Facility: CLINIC | Age: 67
End: 2022-12-12

## 2022-12-12 DIAGNOSIS — J06.9 VIRAL UPPER RESPIRATORY TRACT INFECTION: ICD-10-CM

## 2022-12-12 DIAGNOSIS — R05.1 ACUTE COUGH: Primary | ICD-10-CM

## 2022-12-12 RX ORDER — BENZONATATE 100 MG/1
100 CAPSULE ORAL 3 TIMES DAILY PRN
Qty: 30 CAPSULE | Refills: 0 | Status: SHIPPED | OUTPATIENT
Start: 2022-12-12 | End: 2023-01-03

## 2022-12-12 RX ORDER — AZITHROMYCIN 250 MG/1
TABLET, FILM COATED ORAL
Qty: 6 TABLET | Refills: 0 | Status: SHIPPED | OUTPATIENT
Start: 2022-12-12 | End: 2023-01-03

## 2022-12-12 NOTE — TELEPHONE ENCOUNTER
contacted patient and she said that she has some ear pain, congestion, cough and headache here and there.   She denies and SOB  We have sent a cough medication and antibiotic to the pharmacy for her

## 2022-12-16 DIAGNOSIS — F41.9 ANXIETY AND DEPRESSION: ICD-10-CM

## 2022-12-16 DIAGNOSIS — F32.A ANXIETY AND DEPRESSION: ICD-10-CM

## 2022-12-16 RX ORDER — ESCITALOPRAM OXALATE 20 MG/1
TABLET ORAL
Qty: 90 TABLET | Refills: 4 | Status: SHIPPED | OUTPATIENT
Start: 2022-12-16

## 2023-01-03 ENCOUNTER — OFFICE VISIT (OUTPATIENT)
Dept: FAMILY MEDICINE CLINIC | Facility: CLINIC | Age: 68
End: 2023-01-03
Payer: MEDICARE

## 2023-01-03 VITALS
BODY MASS INDEX: 34.72 KG/M2 | HEART RATE: 81 BPM | RESPIRATION RATE: 18 BRPM | WEIGHT: 203.4 LBS | HEIGHT: 64 IN | OXYGEN SATURATION: 97 % | TEMPERATURE: 97.2 F | SYSTOLIC BLOOD PRESSURE: 120 MMHG | DIASTOLIC BLOOD PRESSURE: 70 MMHG

## 2023-01-03 DIAGNOSIS — E78.2 MIXED HYPERLIPIDEMIA: ICD-10-CM

## 2023-01-03 DIAGNOSIS — F02.A0 MILD LEWY BODY DEMENTIA WITHOUT BEHAVIORAL DISTURBANCE, PSYCHOTIC DISTURBANCE, MOOD DISTURBANCE, OR ANXIETY: ICD-10-CM

## 2023-01-03 DIAGNOSIS — R73.09 ELEVATED GLUCOSE: ICD-10-CM

## 2023-01-03 DIAGNOSIS — E88.81 INSULIN RESISTANCE: Primary | ICD-10-CM

## 2023-01-03 DIAGNOSIS — G31.83 MILD LEWY BODY DEMENTIA WITHOUT BEHAVIORAL DISTURBANCE, PSYCHOTIC DISTURBANCE, MOOD DISTURBANCE, OR ANXIETY: ICD-10-CM

## 2023-01-03 DIAGNOSIS — E03.9 ACQUIRED HYPOTHYROIDISM: ICD-10-CM

## 2023-01-03 DIAGNOSIS — E66.09 CLASS 1 OBESITY DUE TO EXCESS CALORIES WITH SERIOUS COMORBIDITY AND BODY MASS INDEX (BMI) OF 33.0 TO 33.9 IN ADULT: ICD-10-CM

## 2023-01-03 DIAGNOSIS — F32.A ANXIETY AND DEPRESSION: ICD-10-CM

## 2023-01-03 DIAGNOSIS — F41.9 ANXIETY AND DEPRESSION: ICD-10-CM

## 2023-01-03 PROBLEM — N39.46 MIXED STRESS AND URGE URINARY INCONTINENCE: Status: ACTIVE | Noted: 2023-01-03

## 2023-01-03 LAB
BILIRUB BLD-MCNC: NEGATIVE MG/DL
CLARITY, POC: ABNORMAL
COLOR UR: YELLOW
EXPIRATION DATE: NORMAL
GLUCOSE BLDC GLUCOMTR-MCNC: 128 MG/DL (ref 70–130)
GLUCOSE UR STRIP-MCNC: NEGATIVE MG/DL
HBA1C MFR BLD: 6 %
KETONES UR QL: NEGATIVE
LEUKOCYTE EST, POC: NEGATIVE
Lab: NORMAL
NITRITE UR-MCNC: NEGATIVE MG/ML
PH UR: 5 [PH] (ref 5–8)
PROT UR STRIP-MCNC: NEGATIVE MG/DL
RBC # UR STRIP: NEGATIVE /UL
SP GR UR: 1.01 (ref 1–1.03)
UROBILINOGEN UR QL: NORMAL

## 2023-01-03 PROCEDURE — 82962 GLUCOSE BLOOD TEST: CPT | Performed by: FAMILY MEDICINE

## 2023-01-03 PROCEDURE — 3044F HG A1C LEVEL LT 7.0%: CPT | Performed by: FAMILY MEDICINE

## 2023-01-03 PROCEDURE — 83036 HEMOGLOBIN GLYCOSYLATED A1C: CPT | Performed by: FAMILY MEDICINE

## 2023-01-03 PROCEDURE — 81002 URINALYSIS NONAUTO W/O SCOPE: CPT | Performed by: FAMILY MEDICINE

## 2023-01-03 PROCEDURE — 1160F RVW MEDS BY RX/DR IN RCRD: CPT | Performed by: FAMILY MEDICINE

## 2023-01-03 PROCEDURE — 1159F MED LIST DOCD IN RCRD: CPT | Performed by: FAMILY MEDICINE

## 2023-01-03 PROCEDURE — 99214 OFFICE O/P EST MOD 30 MIN: CPT | Performed by: FAMILY MEDICINE

## 2023-01-03 RX ORDER — OXYBUTYNIN CHLORIDE 15 MG/1
15 TABLET, EXTENDED RELEASE ORAL DAILY
Qty: 90 TABLET | Refills: 3 | Status: SHIPPED | OUTPATIENT
Start: 2023-01-03

## 2023-01-03 RX ORDER — METFORMIN HYDROCHLORIDE 500 MG/1
500 TABLET, FILM COATED, EXTENDED RELEASE ORAL
Qty: 90 TABLET | Refills: 3 | Status: SHIPPED | OUTPATIENT
Start: 2023-01-03 | End: 2023-01-03

## 2023-01-03 NOTE — PROGRESS NOTES
Chief Complaint  Blood Sugar Problem, Hypertension, and Hyperlipidemia    Subjective     CC  Problem List  Visit Diagnosis   Encounters  Notes  Medications  Labs  Result Review Imaging  Media    Genevieve Kuo presents to [facility] for   History of Present Illness  hyp  Hyperlipidemia  This is a chronic problem. The current episode started more than 1 year ago. The problem is controlled. Recent lipid tests were reviewed and are high. Exacerbating diseases include diabetes, hypothyroidism and obesity. Pertinent negatives include no chest pain, focal sensory loss, focal weakness, leg pain, myalgias or shortness of breath. Current antihyperlipidemic treatment includes statins. The current treatment provides moderate improvement of lipids. There are no compliance problems.  Risk factors for coronary artery disease include diabetes mellitus, dyslipidemia, family history, obesity and post-menopausal.   Blood Sugar Problem  This is a chronic problem. The current episode started more than 1 month ago. The problem occurs daily. The problem has been gradually improving. Pertinent negatives include no abdominal pain, anorexia, arthralgias, change in bowel habit, chest pain, chills, congestion, coughing, diaphoresis, fatigue, fever, headaches, joint swelling, myalgias, nausea, neck pain, numbness, rash, sore throat, swollen glands, urinary symptoms, vertigo, visual change, vomiting or weakness. Associated symptoms comments: Patient is here today and following up on her glucose sugar problem. She states that she is checking her readings at home and she states that her 7 day average was 131, 14 day average was 128 and her 30 day average was 122. . The symptoms are aggravated by eating and drinking. Treatments tried: Diet controlled  The treatment provided no relief.   Hypothyroidism  This is a chronic problem. The current episode started more than 1 year ago. Pertinent negatives include  no abdominal pain, anorexia, arthralgias, change in bowel habit, chest pain, chills, congestion, coughing, diaphoresis, fatigue, fever, headaches, joint swelling, myalgias, nausea, neck pain, numbness, rash, sore throat, swollen glands, urinary symptoms, vertigo, visual change, vomiting or weakness. Treatments tried: Levothyroxine 75mcg. The treatment provided moderate relief.       Review of Systems   Constitutional: Negative for chills, diaphoresis, fatigue and fever.   HENT: Negative for congestion, sore throat and swollen glands.    Eyes: Negative for visual disturbance.   Respiratory: Negative for cough and shortness of breath.    Cardiovascular: Negative for chest pain and palpitations.   Gastrointestinal: Negative for abdominal pain, anorexia, change in bowel habit, nausea and vomiting.   Endocrine: Negative for cold intolerance, heat intolerance and polydipsia.   Genitourinary: Positive for urgency (with increased incontinence, she wishes to try meds.  Sxs of 3 mos duratin and worsening. ). Negative for dysuria.   Musculoskeletal: Negative for arthralgias, joint swelling, myalgias and neck pain.   Skin: Negative for rash.   Neurological: Positive for memory problem (She remains in neuro follow up her sxs are stable. ). Negative for vertigo, focal weakness, weakness and numbness.   Hematological: Negative for adenopathy. Does not bruise/bleed easily.   Psychiatric/Behavioral: Positive for depressed mood. Negative for suicidal ideas. The patient is nervous/anxious (sxs are stable).         Objective   Vital Signs:   /70 (BP Location: Right arm, Patient Position: Sitting, Cuff Size: Adult)   Pulse 81   Temp 97.2 °F (36.2 °C) (Temporal)   Resp 18   Ht 162.6 cm (64\")   Wt 92.3 kg (203 lb 6.4 oz)   SpO2 97%   BMI 34.91 kg/m²     Physical Exam  Constitutional:       General: She is not in acute distress.  Eyes:      Pupils: Pupils are equal, round, and reactive to light.      Comments: Fundi benign    Cardiovascular:      Rate and Rhythm: Normal rate and regular rhythm.      Heart sounds: No murmur heard.  Pulmonary:      Effort: Pulmonary effort is normal.      Breath sounds: Normal breath sounds. No wheezing.   Musculoskeletal:      Cervical back: Neck supple.      Right lower leg: No edema.      Left lower leg: No edema.   Lymphadenopathy:      Cervical: No cervical adenopathy.   Neurological:      General: No focal deficit present.      Mental Status: She is alert and oriented to person, place, and time.      Sensory: Sensory deficit present.      Motor: No weakness.      Gait: Gait normal.      Comments: She is very knowledgeable to date regarding Lewy body dementia.         Result Review :Labs  Result Review  Imaging  Med Tab  Media                 Assessment and Plan CC Problem List  Visit Diagnosis  ROS  Review (Popup)  Health Maintenance  Quality  BestPractice  Medications  SmartSets  SnapShot Encounters  Media  Problem List Items Addressed This Visit        High    Anxiety and depression    Overview     Controlled continue escitalopram and wellbutrin sxs worse at night. F.U 3 mos         Insulin resistance - Primary    Overview     A1c 6.0 01/03/2023 begin metformin  A1c 5.9 08/14/2022 continued improvement continue healthy diet and exercise  A1c 6.0  06/14/2022  A1c 6.5 01/2022 Diet and exercise discussed. It's importance is understood.             Relevant Medications    metFORMIN (GLUMETZA) 500 MG (MOD) 24 hr tablet    Other Relevant Orders    POCT urinalysis dipstick, manual (Completed)    Mild Lewy body dementia (HCC)    Overview     On Aricept 10 mg doing well.          Relevant Medications    oxybutynin XL (DITROPAN XL) 15 MG 24 hr tablet       Medium    Acquired hypothyroidism    Overview     No sxs compliant TSH today.            Low    Mixed hyperlipidemia    Overview     decreased HDL, 31,  Compliant with Statin Rx         Class 1 obesity due to excess calories with serious  comorbidity and body mass index (BMI) of 33.0 to 33.9 in adult    Overview     Healthy diet and regular exercise and wt loss encouraged.         Other Visit Diagnoses     Elevated glucose        Relevant Orders    POC Glycosylated Hemoglobin (Hb A1C) (Completed)    POCT Glucose (Completed)          Follow Up Instructions Charge Capture  Follow-up Communications  Return in about 3 months (around 4/3/2023).  Patient was given instructions and counseling regarding her condition or for health maintenance advice. Please see specific information pulled into the AVS if appropriate.   Answers for HPI/ROS submitted by the patient on 12/12/2022  Please describe your symptoms.: Incontinence urinary  Have you had these symptoms before?: Yes  How long have you been having these symptoms?: Greater than 2 weeks  What is the primary reason for your visit?: Other

## 2023-01-11 RX ORDER — GABAPENTIN 300 MG/1
CAPSULE ORAL
Qty: 60 CAPSULE | Refills: 12 | Status: SHIPPED | OUTPATIENT
Start: 2023-01-11

## 2023-01-16 DIAGNOSIS — H81.13 VERTIGO, BENIGN PAROXYSMAL, BILATERAL: ICD-10-CM

## 2023-01-16 RX ORDER — MECLIZINE HYDROCHLORIDE 25 MG/1
25 TABLET ORAL 4 TIMES DAILY
Qty: 40 TABLET | Refills: 3 | Status: SHIPPED | OUTPATIENT
Start: 2023-01-16

## 2023-02-26 DIAGNOSIS — E88.81 INSULIN RESISTANCE: ICD-10-CM

## 2023-03-09 DIAGNOSIS — J30.1 CHRONIC SEASONAL ALLERGIC RHINITIS DUE TO POLLEN: ICD-10-CM

## 2023-03-09 RX ORDER — MONTELUKAST SODIUM 10 MG/1
TABLET ORAL
Qty: 90 TABLET | Refills: 4 | Status: SHIPPED | OUTPATIENT
Start: 2023-03-09

## 2023-03-20 ENCOUNTER — TELEPHONE (OUTPATIENT)
Dept: NEUROLOGY | Facility: CLINIC | Age: 68
End: 2023-03-20
Payer: MEDICARE

## 2023-03-20 RX ORDER — DONEPEZIL HYDROCHLORIDE 5 MG/1
5 TABLET, FILM COATED ORAL
Qty: 90 TABLET | Refills: 3 | Status: SHIPPED | OUTPATIENT
Start: 2023-03-20

## 2023-03-20 NOTE — TELEPHONE ENCOUNTER
I changed the aricept to 5mg take one per day    Are these tremors just when sitting still,resting tremors, or activity associated tremors?

## 2023-03-20 NOTE — TELEPHONE ENCOUNTER
Caller: IRMA     Ji call back number: [phone]    What was the call regarding: PT SAID SHE IS HAVING TREMORS ALL OVER AND NEEDS A RX TO HELP. SHE CAN'T TAKE HIGH DOSE  ARICEPT AS MAKES HER SICK,     Do you require a callback: YES ASAP     [id number] - [place], [place] - [address] - [phone]  - [phone]   [phone]    PLEASE ADVISE

## 2023-03-22 NOTE — TELEPHONE ENCOUNTER
We can try low dose sinemet,   If she does have Lewy body disease, it may or may not help and may cause hallucinations  If it does cause hallucinations just quit taking  Script sent for sinemet 25/100 1/2 tab  twice per day take one in am and one in early afternoon

## 2023-03-22 NOTE — TELEPHONE ENCOUNTER
She is aware of med changes    Tremors are at rest and activity and come and go, doesn't last all the time.

## 2023-03-23 DIAGNOSIS — E03.9 ACQUIRED HYPOTHYROIDISM: ICD-10-CM

## 2023-03-23 DIAGNOSIS — F41.9 ANXIETY AND DEPRESSION: ICD-10-CM

## 2023-03-23 DIAGNOSIS — F32.A ANXIETY AND DEPRESSION: ICD-10-CM

## 2023-03-24 RX ORDER — LEVOTHYROXINE SODIUM 0.07 MG/1
TABLET ORAL
Qty: 90 TABLET | Refills: 4 | Status: SHIPPED | OUTPATIENT
Start: 2023-03-24 | End: 2023-04-04 | Stop reason: DRUGHIGH

## 2023-03-24 RX ORDER — BUPROPION HYDROCHLORIDE 300 MG/1
TABLET ORAL
Qty: 90 TABLET | Refills: 4 | Status: SHIPPED | OUTPATIENT
Start: 2023-03-24

## 2023-03-30 DIAGNOSIS — E78.2 MIXED HYPERLIPIDEMIA: ICD-10-CM

## 2023-03-31 RX ORDER — ATORVASTATIN CALCIUM 10 MG/1
TABLET, FILM COATED ORAL
Qty: 90 TABLET | Refills: 3 | Status: SHIPPED | OUTPATIENT
Start: 2023-03-31

## 2023-04-03 ENCOUNTER — OFFICE VISIT (OUTPATIENT)
Dept: FAMILY MEDICINE CLINIC | Facility: CLINIC | Age: 68
End: 2023-04-03
Payer: MEDICARE

## 2023-04-03 VITALS
RESPIRATION RATE: 16 BRPM | OXYGEN SATURATION: 92 % | HEART RATE: 90 BPM | SYSTOLIC BLOOD PRESSURE: 126 MMHG | WEIGHT: 206.4 LBS | TEMPERATURE: 96.9 F | BODY MASS INDEX: 35.24 KG/M2 | DIASTOLIC BLOOD PRESSURE: 82 MMHG | HEIGHT: 64 IN

## 2023-04-03 DIAGNOSIS — R73.09 ELEVATED GLUCOSE: ICD-10-CM

## 2023-04-03 DIAGNOSIS — E88.81 INSULIN RESISTANCE: Primary | ICD-10-CM

## 2023-04-03 DIAGNOSIS — E66.01 CLASS 2 SEVERE OBESITY DUE TO EXCESS CALORIES WITH SERIOUS COMORBIDITY AND BODY MASS INDEX (BMI) OF 36.0 TO 36.9 IN ADULT: ICD-10-CM

## 2023-04-03 DIAGNOSIS — J30.1 CHRONIC SEASONAL ALLERGIC RHINITIS DUE TO POLLEN: ICD-10-CM

## 2023-04-03 DIAGNOSIS — R35.0 FREQUENCY OF URINATION: ICD-10-CM

## 2023-04-03 DIAGNOSIS — E03.9 ACQUIRED HYPOTHYROIDISM: ICD-10-CM

## 2023-04-03 DIAGNOSIS — E78.2 MIXED HYPERLIPIDEMIA: ICD-10-CM

## 2023-04-03 PROBLEM — E66.812 CLASS 2 SEVERE OBESITY DUE TO EXCESS CALORIES WITH SERIOUS COMORBIDITY AND BODY MASS INDEX (BMI) OF 35.0 TO 35.9 IN ADULT: Status: ACTIVE | Noted: 2021-09-27

## 2023-04-03 LAB
BILIRUB BLD-MCNC: NEGATIVE MG/DL
CLARITY, POC: CLEAR
COLOR UR: YELLOW
GLUCOSE UR STRIP-MCNC: NEGATIVE MG/DL
KETONES UR QL: ABNORMAL
LEUKOCYTE EST, POC: NEGATIVE
NITRITE UR-MCNC: POSITIVE MG/ML
PH UR: 5 [PH] (ref 5–8)
PROT UR STRIP-MCNC: ABNORMAL MG/DL
RBC # UR STRIP: ABNORMAL /UL
SP GR UR: 1.02 (ref 1–1.03)
UROBILINOGEN UR QL: ABNORMAL

## 2023-04-03 NOTE — PROGRESS NOTES
Chief Complaint  Hyperlipidemia, Hypothyroidism, Blood Sugar Problem, and Allergic Rhinitis    Subjective     CC  Problem List  Visit Diagnosis   Encounters  Notes  Medications  Labs  Result Review Imaging  Media    Genevieve Kuo presents to [facility] for   History of Present Illness  hyp  Hyperlipidemia  This is a chronic problem. The current episode started more than 1 year ago. The problem is controlled. Recent lipid tests were reviewed and are high. Exacerbating diseases include diabetes, hypothyroidism and obesity. Pertinent negatives include no chest pain, focal sensory loss, focal weakness, leg pain, myalgias or shortness of breath. Current antihyperlipidemic treatment includes statins. The current treatment provides moderate improvement of lipids. There are no compliance problems.  Risk factors for coronary artery disease include diabetes mellitus, dyslipidemia, family history, obesity and post-menopausal.   Blood Sugar Problem  This is a chronic problem. The current episode started more than 1 month ago. The problem occurs daily. The problem has been gradually improving. Associated symptoms include coughing and a visual change. Pertinent negatives include no abdominal pain, anorexia, arthralgias, change in bowel habit, chest pain, chills, congestion, diaphoresis, fatigue, fever, headaches, joint swelling, myalgias, nausea, neck pain, numbness, rash, sore throat, swollen glands, urinary symptoms, vertigo, vomiting or weakness. Associated symptoms comments: Patient is here today and following up on her glucose sugar problem. She states that she is checking her readings at home and she states that her 7 day average was 131, 14 day average was 128 and her 30 day average was 122. . The symptoms are aggravated by eating and drinking. Treatments tried: Diet controlled  The treatment provided no relief.   Hypothyroidism  This is a chronic problem. The current episode  "started more than 1 year ago. Associated symptoms include coughing and a visual change. Pertinent negatives include no abdominal pain, anorexia, arthralgias, change in bowel habit, chest pain, chills, congestion, diaphoresis, fatigue, fever, headaches, joint swelling, myalgias, nausea, neck pain, numbness, rash, sore throat, swollen glands, urinary symptoms, vertigo, vomiting or weakness. Treatments tried: Levothyroxine 75mcg. The treatment provided moderate relief.       Review of Systems   Constitutional: Negative for chills, diaphoresis, fatigue, fever and unexpected weight loss.   HENT: Negative for congestion, sore throat and swollen glands.    Eyes: Negative for visual disturbance.   Respiratory: Positive for cough. Negative for shortness of breath.    Cardiovascular: Negative for chest pain.   Gastrointestinal: Negative for abdominal pain, anorexia, change in bowel habit, constipation, diarrhea, nausea and vomiting.   Endocrine: Negative for cold intolerance, heat intolerance, polydipsia and polyuria.   Genitourinary: Negative for dysuria.   Musculoskeletal: Negative for arthralgias, joint swelling, myalgias and neck pain.   Skin: Negative for rash.   Neurological: Negative for vertigo, focal weakness, weakness and numbness.   Hematological: Negative for adenopathy. Does not bruise/bleed easily.        Objective   Vital Signs:   /82 (BP Location: Right arm, Patient Position: Sitting, Cuff Size: Adult)   Pulse 90   Temp 96.9 °F (36.1 °C) (Temporal)   Resp 16   Ht 162.6 cm (64\")   Wt 93.6 kg (206 lb 6.4 oz)   SpO2 92% Comment: room air  BMI 35.43 kg/m²     Physical Exam  Constitutional:       General: She is not in acute distress.  Eyes:      Pupils: Pupils are equal, round, and reactive to light.      Comments: Fundi benign   Cardiovascular:      Rate and Rhythm: Normal rate and regular rhythm.      Heart sounds: No murmur heard.  Pulmonary:      Effort: Pulmonary effort is normal.      Breath " sounds: Normal breath sounds. No wheezing.   Musculoskeletal:      Cervical back: Neck supple.      Right lower leg: No edema.      Left lower leg: No edema.   Lymphadenopathy:      Cervical: No cervical adenopathy.   Neurological:      General: No focal deficit present.      Mental Status: She is alert and oriented to person, place, and time.      Sensory: Sensory deficit present.      Motor: No weakness.      Gait: Gait normal.      Comments: She is very knowledgeable to date regarding Lewy body dementia.         Result Review :Labs  Result Review  Imaging  Med Tab  Media                 Assessment and Plan CC Problem List  Visit Diagnosis  ROS  Review (Popup)  Health Maintenance  Quality  BestPractice  Medications  SmartSets  SnapShot Encounters  Media  Problem List Items Addressed This Visit        High    Insulin resistance - Primary    Overview     A1c 6.0 01/03/2023 begin metformin  A1c 5.9 08/14/2022 continued improvement continue healthy diet and exercise  A1c 6.0  06/14/2022  A1c 6.5 01/2022 Diet and exercise discussed. It's importance is understood.             Relevant Orders    POCT urinalysis dipstick, manual (Completed)    CBC & Differential    Comprehensive Metabolic Panel       Medium    Acquired hypothyroidism    Overview     No sxs compliant TSH wnl            Low    Mixed hyperlipidemia    Overview     decreased HDL, 31,  Compliant with Statin Rx         Relevant Orders    Lipid Panel With / Chol / HDL Ratio    TSH    Chronic seasonal allergic rhinitis due to pollen    Overview     with reactive airways  sxs mild SOA with exercise out doors. Rx maximally and f.u if no improvement         Class 2 severe obesity due to excess calories with serious comorbidity and body mass index (BMI) of 36.0 to 36.9 in adult    Overview     Healthy diet and regular exercise and wt loss encouraged.          Current Assessment & Plan     Patient's (Body mass index is 35.43 kg/m².) indicates that they  are obese (BMI >30) with health conditions that include hypertension and dyslipidemias . Weight is improving with lifestyle modifications. BMI  is above average; BMI management plan is completed. We discussed low calorie, low carb based diet program, portion control and increasing exercise.         Other Visit Diagnoses     Elevated glucose        Relevant Orders    Hemoglobin A1c    Frequency of urination        Neg u/a push fluids and follow    Relevant Orders    Urine Culture - Urine, Urine, Clean Catch    Urinalysis With Microscopic - Urine, Clean Catch          Follow Up Instructions Charge Capture  Follow-up Communications  Return in about 3 months (around 7/3/2023).  Patient was given instructions and counseling regarding her condition or for health maintenance advice. Please see specific information pulled into the AVS if appropriate.

## 2023-04-04 DIAGNOSIS — E03.9 ACQUIRED HYPOTHYROIDISM: Primary | ICD-10-CM

## 2023-04-04 LAB
ALBUMIN SERPL-MCNC: 4.7 G/DL (ref 3.8–4.8)
ALBUMIN/GLOB SERPL: 2.4 {RATIO} (ref 1.2–2.2)
ALP SERPL-CCNC: 122 IU/L (ref 44–121)
ALT SERPL-CCNC: 14 IU/L (ref 0–32)
APPEARANCE UR: ABNORMAL
AST SERPL-CCNC: 16 IU/L (ref 0–40)
BACTERIA #/AREA URNS HPF: ABNORMAL /[HPF]
BASOPHILS # BLD AUTO: 0.1 X10E3/UL (ref 0–0.2)
BASOPHILS NFR BLD AUTO: 1 %
BILIRUB SERPL-MCNC: 0.2 MG/DL (ref 0–1.2)
BILIRUB UR QL STRIP: NEGATIVE
BUN SERPL-MCNC: 15 MG/DL (ref 8–27)
BUN/CREAT SERPL: 15 (ref 12–28)
CALCIUM SERPL-MCNC: 9.8 MG/DL (ref 8.7–10.3)
CASTS URNS MICRO: ABNORMAL
CASTS URNS QL MICRO: PRESENT /LPF
CHLORIDE SERPL-SCNC: 103 MMOL/L (ref 96–106)
CHOLEST SERPL-MCNC: 163 MG/DL (ref 100–199)
CHOLEST/HDLC SERPL: 3.4 RATIO (ref 0–4.4)
CO2 SERPL-SCNC: 23 MMOL/L (ref 20–29)
COLOR UR: YELLOW
CREAT SERPL-MCNC: 1.03 MG/DL (ref 0.57–1)
CRYSTALS URNS MICRO: ABNORMAL
EGFRCR SERPLBLD CKD-EPI 2021: 58 ML/MIN/1.73
EOSINOPHIL # BLD AUTO: 0.3 X10E3/UL (ref 0–0.4)
EOSINOPHIL NFR BLD AUTO: 4 %
EPI CELLS #/AREA URNS HPF: ABNORMAL /HPF (ref 0–10)
ERYTHROCYTE [DISTWIDTH] IN BLOOD BY AUTOMATED COUNT: 13 % (ref 11.7–15.4)
GLOBULIN SER CALC-MCNC: 2 G/DL (ref 1.5–4.5)
GLUCOSE SERPL-MCNC: 104 MG/DL (ref 70–99)
GLUCOSE UR QL STRIP: NEGATIVE
HBA1C MFR BLD: 6.2 % (ref 4.8–5.6)
HCT VFR BLD AUTO: 40.9 % (ref 34–46.6)
HDLC SERPL-MCNC: 48 MG/DL
HGB BLD-MCNC: 13.2 G/DL (ref 11.1–15.9)
HGB UR QL STRIP: ABNORMAL
IMM GRANULOCYTES # BLD AUTO: 0 X10E3/UL (ref 0–0.1)
IMM GRANULOCYTES NFR BLD AUTO: 0 %
KETONES UR QL STRIP: NEGATIVE
LDLC SERPL CALC-MCNC: 90 MG/DL (ref 0–99)
LEUKOCYTE ESTERASE UR QL STRIP: ABNORMAL
LYMPHOCYTES # BLD AUTO: 2.4 X10E3/UL (ref 0.7–3.1)
LYMPHOCYTES NFR BLD AUTO: 32 %
MCH RBC QN AUTO: 29.8 PG (ref 26.6–33)
MCHC RBC AUTO-ENTMCNC: 32.3 G/DL (ref 31.5–35.7)
MCV RBC AUTO: 92 FL (ref 79–97)
MICRO URNS: ABNORMAL
MONOCYTES # BLD AUTO: 0.5 X10E3/UL (ref 0.1–0.9)
MONOCYTES NFR BLD AUTO: 6 %
MUCOUS THREADS URNS QL MICRO: PRESENT
NEUTROPHILS # BLD AUTO: 4.2 X10E3/UL (ref 1.4–7)
NEUTROPHILS NFR BLD AUTO: 57 %
NITRITE UR QL STRIP: POSITIVE
PH UR STRIP: 5 [PH] (ref 5–7.5)
PLATELET # BLD AUTO: 295 X10E3/UL (ref 150–450)
POTASSIUM SERPL-SCNC: 4.3 MMOL/L (ref 3.5–5.2)
PROT SERPL-MCNC: 6.7 G/DL (ref 6–8.5)
PROT UR QL STRIP: ABNORMAL
RBC # BLD AUTO: 4.43 X10E6/UL (ref 3.77–5.28)
RBC #/AREA URNS HPF: ABNORMAL /HPF (ref 0–2)
SODIUM SERPL-SCNC: 143 MMOL/L (ref 134–144)
SP GR UR STRIP: 1.02 (ref 1–1.03)
TRIGL SERPL-MCNC: 144 MG/DL (ref 0–149)
TSH SERPL DL<=0.005 MIU/L-ACNC: 6.32 UIU/ML (ref 0.45–4.5)
UNIDENT CRYS URNS QL MICRO: PRESENT
UROBILINOGEN UR STRIP-MCNC: 0.2 MG/DL (ref 0.2–1)
VLDLC SERPL CALC-MCNC: 25 MG/DL (ref 5–40)
WBC # BLD AUTO: 7.4 X10E3/UL (ref 3.4–10.8)
WBC #/AREA URNS HPF: >30 /HPF (ref 0–5)

## 2023-04-04 RX ORDER — LEVOTHYROXINE SODIUM 0.1 MG/1
100 TABLET ORAL DAILY
Qty: 90 TABLET | Refills: 3 | Status: SHIPPED | OUTPATIENT
Start: 2023-04-04

## 2023-04-04 NOTE — ASSESSMENT & PLAN NOTE
Patient's (Body mass index is 35.43 kg/m².) indicates that they are obese (BMI >30) with health conditions that include hypertension and dyslipidemias . Weight is improving with lifestyle modifications. BMI  is above average; BMI management plan is completed. We discussed low calorie, low carb based diet program, portion control and increasing exercise.

## 2023-04-06 DIAGNOSIS — N30.90 CYSTITIS: Primary | ICD-10-CM

## 2023-04-06 LAB
BACTERIA UR CULT: ABNORMAL
BACTERIA UR CULT: ABNORMAL
OTHER ANTIBIOTIC SUSC ISLT: ABNORMAL

## 2023-04-06 RX ORDER — SULFAMETHOXAZOLE AND TRIMETHOPRIM 800; 160 MG/1; MG/1
1 TABLET ORAL 2 TIMES DAILY
Qty: 20 TABLET | Refills: 0 | Status: SHIPPED | OUTPATIENT
Start: 2023-04-06

## 2023-05-24 NOTE — PROGRESS NOTES
Chief Complaint  Sleep Apnea    Subjective          Genevieve Kuo presents to [facility]  History of Present Illness  CAMILA f/u patient states she is not using her Cpap anymore. She trouble with it at night., she tried the FFM and canula and goes through [place] for supplies.      Sleep testing history:    On NPSG at [facility] , 6/6/22 patient had Mild obstructive sleep apnea syndrome with apnea-hypopnea index of 12.2 per sleep hour, minimum SpO2 of 80%    PAP download:    The patient's hypersomnia has stayed the same       [place] Sleepiness Scale:  Sitting and reading 3 WatchingTV 3  Sitting, inactive, in a public place 0  As a passenger in a car for 1 hour w/o a break  2  Lying down to rest in the afternoon  3  Sitting and talking to someone  0  Sitting quietly after a lunch  3  In a car, while stopped for traffic or a light  0  Total 15    Patient is currently taking aricept for [id number] daily  Short term and long term doing good, but not any better.    Balance is not good , short steps , lean forward.     Pt does have frequent visual hallucinations    Some days better than others.     ===========================================  New patient referred by  for memory loss. Patient states her memory is getting worse in the past 6  Months, such as getting lost in her hometown, short term memory, and loss of words.She is not currently taking medication for memory. Patient does have  h/o dementia ( mom,maternal aunt)     Pt fell out of bed and hit head, had ct of head about June of last year  Since then noted some trouble with memory.  Stare off into space, lose track mid sentence, or word finding problem  Put wrong name tags on presents,   Ordered incorrect count on [employer].     Had fallen out two or three times. Not dreaming activity.      Has had vertigo. Helped with taking meclizine.   Sometimes a feeling of falling. Sometimes feel woozy     Quit driving due to dizziness,  Couldn't  "remember which way to go to go home.  She noted passing where she intended to turn.   Lost in living room.  She yelled for help, did not recognize where she was at . Over one year ago.      Was a  for years. Now a pitch in dinner makes her nervous.   How gown on backwards the other night     Mother and maternal aunt had memory problems, onset in 80's     History of snoring and day time fatigue   ========================================     Review of Systems   Constitutional: Positive for fatigue.   HENT: Positive for tinnitus.    Eyes: Positive for itching.   Respiratory: Positive for shortness of breath.    Musculoskeletal: Positive for back pain and gait problem.   Neurological: Positive for dizziness, tremors and light-headedness.   Psychiatric/Behavioral: Positive for decreased concentration, hallucinations and sleep disturbance.   All other systems reviewed and are negative.        Objective   Vital Signs:   /81   Pulse 72   Ht 162.6 cm (64\")   Wt 90.7 kg (200 lb)   BMI 34.33 kg/m²     Physical Exam  Vitals reviewed.   Cardiovascular:      Rate and Rhythm: Normal rate.      Pulses: Normal pulses.   Pulmonary:      Effort: Pulmonary effort is normal. No respiratory distress.   Neurological:      General: No focal deficit present.      Mental Status: She is alert and oriented to person, place, and time.   Psychiatric:         Mood and Affect: Mood normal.        Result Review :                 Assessment and Plan    Diagnoses and all orders for this visit:    1. CAMILA (obstructive sleep apnea) (Primary)  Overview:  Stable on CPAP      2. REM sleep behavior disorder    3. Mild cognitive impairment with memory loss  Overview:  Negative findings on neurologic consult. Concern for mild cognitive impairment. Mini status exam normal 06/22. She feels she is having decline 09/2022. Continue neurologic follow up      4. Mild Lewy body dementia, unspecified whether behavioral, psychotic, or mood " disturbance or anxiety  Overview:  On Aricept 5 mg doing well.       Pt having difficulty with pap co sob, will change pressure to min 7 max 12  No rx for rem behavior disorder    Will send for PT to help with ambulation.       Follow Up   Return in about 6 months (around 11/25/2023).    Patient was given instructions and counseling regarding her condition or for health maintenance advice. Please see specific information pulled into the AVS if appropriate.       This document has been electronically signed by Joseph Seipel, MD on May 25, 2023 14:33 EDT

## 2023-05-25 ENCOUNTER — OFFICE VISIT (OUTPATIENT)
Dept: NEUROLOGY | Facility: CLINIC | Age: 68
End: 2023-05-25
Payer: MEDICARE

## 2023-05-25 VITALS
SYSTOLIC BLOOD PRESSURE: 127 MMHG | WEIGHT: 200 LBS | DIASTOLIC BLOOD PRESSURE: 81 MMHG | BODY MASS INDEX: 34.15 KG/M2 | HEART RATE: 72 BPM | HEIGHT: 64 IN

## 2023-05-25 DIAGNOSIS — G31.83 MILD LEWY BODY DEMENTIA, UNSPECIFIED WHETHER BEHAVIORAL, PSYCHOTIC, OR MOOD DISTURBANCE OR ANXIETY: ICD-10-CM

## 2023-05-25 DIAGNOSIS — G47.33 OSA (OBSTRUCTIVE SLEEP APNEA): Primary | ICD-10-CM

## 2023-05-25 DIAGNOSIS — F02.A0 MILD LEWY BODY DEMENTIA, UNSPECIFIED WHETHER BEHAVIORAL, PSYCHOTIC, OR MOOD DISTURBANCE OR ANXIETY: ICD-10-CM

## 2023-05-25 DIAGNOSIS — G47.52 REM SLEEP BEHAVIOR DISORDER: ICD-10-CM

## 2023-05-25 DIAGNOSIS — G31.84 MILD COGNITIVE IMPAIRMENT WITH MEMORY LOSS: ICD-10-CM

## 2023-05-25 PROCEDURE — 1159F MED LIST DOCD IN RCRD: CPT | Performed by: PSYCHIATRY & NEUROLOGY

## 2023-05-25 PROCEDURE — 1160F RVW MEDS BY RX/DR IN RCRD: CPT | Performed by: PSYCHIATRY & NEUROLOGY

## 2023-05-25 PROCEDURE — 99214 OFFICE O/P EST MOD 30 MIN: CPT | Performed by: PSYCHIATRY & NEUROLOGY

## 2023-06-01 RX ORDER — DONEPEZIL HYDROCHLORIDE 10 MG/1
10 TABLET, FILM COATED ORAL NIGHTLY
Qty: 90 TABLET | Refills: 3 | Status: SHIPPED | OUTPATIENT
Start: 2023-06-01

## 2023-07-10 PROBLEM — G47.52 REM SLEEP BEHAVIOR DISORDER: Status: RESOLVED | Noted: 2022-12-05 | Resolved: 2023-07-10

## 2023-07-10 PROBLEM — G31.84 MILD COGNITIVE IMPAIRMENT WITH MEMORY LOSS: Status: RESOLVED | Noted: 2022-04-29 | Resolved: 2023-07-10

## 2023-07-10 PROBLEM — E66.9 OBESITY, UNSPECIFIED: Status: ACTIVE | Noted: 2021-09-27

## 2023-07-10 PROBLEM — E88.810 METABOLIC SYNDROME: Status: ACTIVE | Noted: 2022-03-06

## 2023-07-24 ENCOUNTER — TREATMENT (OUTPATIENT)
Dept: PHYSICAL THERAPY | Facility: CLINIC | Age: 68
End: 2023-07-24
Payer: MEDICARE

## 2023-07-24 DIAGNOSIS — G31.83 MILD LEWY BODY DEMENTIA, UNSPECIFIED WHETHER BEHAVIORAL, PSYCHOTIC, OR MOOD DISTURBANCE OR ANXIETY: ICD-10-CM

## 2023-07-24 DIAGNOSIS — F02.A0 MILD LEWY BODY DEMENTIA, UNSPECIFIED WHETHER BEHAVIORAL, PSYCHOTIC, OR MOOD DISTURBANCE OR ANXIETY: ICD-10-CM

## 2023-07-24 DIAGNOSIS — Z74.09 IMPAIRED FUNCTIONAL MOBILITY, BALANCE, AND ENDURANCE: Primary | ICD-10-CM

## 2023-07-24 PROCEDURE — 97112 NEUROMUSCULAR REEDUCATION: CPT | Performed by: PHYSICAL THERAPIST

## 2023-07-24 PROCEDURE — 97110 THERAPEUTIC EXERCISES: CPT | Performed by: PHYSICAL THERAPIST

## 2023-07-24 PROCEDURE — 97530 THERAPEUTIC ACTIVITIES: CPT | Performed by: PHYSICAL THERAPIST

## 2023-07-24 NOTE — PROGRESS NOTES
Physical Therapy Daily Treatment Note    Patient: Genevieve Kuo   : 1955  Diagnosis/ICD-10 Code:  Impaired functional mobility, balance, and endurance [Z74.09]  Referring practitioner: Joseph F Seipel, MD  Date of Initial Visit: Type: THERAPY  Noted: 2023  Today's Date: 2023  Patient seen for 4 sessions             Subjective Patient reports she's feeling pretty good today.    Objective   See Exercise, Manual, and Modality Logs for complete treatment. Patient lists to L in sitting and standing and is aware. Assessed TUG up and go with a time of 19 sec with standard cane (goal = 12 sec). Plan to reassess NEXT visit.  Reviewed posture on wall and static standing on decline to encourage a posterior center of gravity to help with balance. VC for slow and controlled movement rather than fast and shortened movement. Progressed to standing with overhead press for shoulder strengthening  standing. Added leg press with good initial response. No LOB in clinic, even with sternal nudge today. Add stacking activity reaching in lower cabinet NEXT for fine motor combined with depth perception.  Stepping strategies still challenging for left backward and right backward today.  Able to maintain balance on level ground with NBOS and EC, progressed to WBOS on foam with EC this visit with good response. One LOB today but self-corrected, after coming off decline balance activity.           Assessment/Plan  Patient independent with HEP in 2 weeks - MET  Tolerate 10 min Nustep in 2 weeks - MET  Able to SLS 10 sec B in 2 weeks - NOT MET  Able to maintain balance with sternal nudge x 3 with NBOS in 2 weeks - MET  Able to ambulate independently with cane and minimal LOB in 2 weeks - MET  Improve function as evidenced by Tinetti gait+balance score of 20% or less in 12 weeks (48% impairment initially) - NOT MET  Able to initiate gait without delay in 12 weeks - NOT MET  Perform 10 reps of repeated sit to stand without  arms in 30 seconds in 12 weeks - MET  Able to balance with NBOS eyes closed in 12 weeks - MET    Progress per Plan of Care exp 9/28/23           Timed:         Manual Therapy:         mins  36465;     Therapeutic Exercise:    10     mins  80336;     Neuromuscular Silvana:    15    mins  27875;    Therapeutic Activity:     15     mins  32913;     Gait Training:           mins  05722;     Ultrasound:          mins  21713;    Ionto                                   mins   42232  Self Care                            mins   85228      Un-Timed:  Electrical Stimulation:         mins  73621 ( );  Dry Needling          mins self-pay  Traction          mins 43032  Low Eval          Mins  21747  Mod Eval          Mins  82620  High Eval                            Mins  47604  Canalith Repos                   mins  47364    Timed Treatment:   40   mins   Total Treatment:     40   mins    Robin A Sprigler, PT  Physical Therapist

## 2023-07-31 ENCOUNTER — TREATMENT (OUTPATIENT)
Dept: PHYSICAL THERAPY | Facility: CLINIC | Age: 68
End: 2023-07-31
Payer: MEDICARE

## 2023-07-31 DIAGNOSIS — Z74.09 IMPAIRED FUNCTIONAL MOBILITY, BALANCE, AND ENDURANCE: Primary | ICD-10-CM

## 2023-07-31 DIAGNOSIS — G31.83 MILD LEWY BODY DEMENTIA, UNSPECIFIED WHETHER BEHAVIORAL, PSYCHOTIC, OR MOOD DISTURBANCE OR ANXIETY: ICD-10-CM

## 2023-07-31 DIAGNOSIS — F02.A0 MILD LEWY BODY DEMENTIA, UNSPECIFIED WHETHER BEHAVIORAL, PSYCHOTIC, OR MOOD DISTURBANCE OR ANXIETY: ICD-10-CM

## 2023-07-31 PROCEDURE — 97112 NEUROMUSCULAR REEDUCATION: CPT | Performed by: PHYSICAL THERAPIST

## 2023-07-31 PROCEDURE — 97110 THERAPEUTIC EXERCISES: CPT | Performed by: PHYSICAL THERAPIST

## 2023-07-31 PROCEDURE — 97530 THERAPEUTIC ACTIVITIES: CPT | Performed by: PHYSICAL THERAPIST

## 2023-08-07 ENCOUNTER — TREATMENT (OUTPATIENT)
Dept: PHYSICAL THERAPY | Facility: CLINIC | Age: 68
End: 2023-08-07
Payer: MEDICARE

## 2023-08-07 DIAGNOSIS — G31.83 MILD LEWY BODY DEMENTIA, UNSPECIFIED WHETHER BEHAVIORAL, PSYCHOTIC, OR MOOD DISTURBANCE OR ANXIETY: ICD-10-CM

## 2023-08-07 DIAGNOSIS — F02.A0 MILD LEWY BODY DEMENTIA, UNSPECIFIED WHETHER BEHAVIORAL, PSYCHOTIC, OR MOOD DISTURBANCE OR ANXIETY: ICD-10-CM

## 2023-08-07 DIAGNOSIS — Z74.09 IMPAIRED FUNCTIONAL MOBILITY, BALANCE, AND ENDURANCE: Primary | ICD-10-CM

## 2023-08-07 PROCEDURE — 97110 THERAPEUTIC EXERCISES: CPT | Performed by: PHYSICAL THERAPIST

## 2023-08-07 PROCEDURE — 97530 THERAPEUTIC ACTIVITIES: CPT | Performed by: PHYSICAL THERAPIST

## 2023-08-07 PROCEDURE — 97112 NEUROMUSCULAR REEDUCATION: CPT | Performed by: PHYSICAL THERAPIST

## 2023-08-07 NOTE — PROGRESS NOTES
Physical Therapy Daily Treatment Note    Patient: Genevieve Kuo   : 1955  Diagnosis/ICD-10 Code:  Impaired functional mobility, balance, and endurance [Z74.09]  Referring practitioner: Joseph F Seipel, MD  Date of Initial Visit: Type: THERAPY  Noted: 2023  Today's Date: 2023  Patient seen for 6 sessions             Subjective Patient reports still feeling wobbly for the past couple weeks but no falls. She overslept and missed last visit due to erratic sleep schedule due to disease.  She reports doing better with reaching in low cabinets.    Objective   See Exercise, Manual, and Modality Logs for complete treatment. Patient lists to L in sitting and standing and is aware. Re-assessed TUG up and go with a time of 18.66 sec with standard cane (goal = 12 sec, 19 sec at eval).  Reviewed posture on wall and static standing on decline to encourage a posterior center of gravity to help with balance. VC for slow and controlled movement rather than fast and shortened movement. Better balance with stacking activity reaching in lower cabinet for balance, fine motor combined with depth perception.  Stepping strategies still challenging for left backward today.  Able to maintain balance on level ground with NBOS and EC, progressing with WBOS on foam with EC this visit with good response. One LOB today with stepping back on L foot, after coming off decline balance activity.   Added standing reach forward over table without LOB.  SLS = 10 sec B. EO balance on foam = 30 sec.  Difficulty remains with EC on foam.      Assessment/Plan  Patient independent with HEP in 2 weeks - MET  Tolerate 10 min Nustep in 2 weeks - MET  Able to SLS 10 sec B in 2 weeks - MET  Able to maintain balance with sternal nudge x 3 with NBOS in 2 weeks - MET  Able to ambulate independently with cane and minimal LOB in 2 weeks - MET  Improve function as evidenced by Tinetti gait+balance score of 20% or less in 12 weeks (48% impairment  initially) - NOT MET  Able to initiate gait without delay in 12 weeks - MET  Perform 10 reps of repeated sit to stand without arms in 30 seconds in 12 weeks - MET  Able to balance with NBOS eyes closed in 12 weeks - MET     Progress per Plan of Care exp 9/28/23           Timed:         Manual Therapy:         mins  57783;     Therapeutic Exercise:    10    mins  57898;     Neuromuscular Silvana:    15    mins  08955;    Therapeutic Activity:     15     mins  17473;     Gait Training:           mins  45829;     Ultrasound:          mins  89848;    Ionto                                   mins   94838  Self Care                            mins   01029      Un-Timed:  Electrical Stimulation:         mins  27207 ( );  Dry Needling          mins self-pay  Traction          mins 11278  Low Eval          Mins  31381  Mod Eval          Mins  73621  High Eval                            Mins  40234  Canalith Repos                   mins  51925    Timed Treatment:   40   mins   Total Treatment:     40   mins    Robin A Sprigler, PT  Physical Therapist

## 2023-08-10 ENCOUNTER — TREATMENT (OUTPATIENT)
Dept: PHYSICAL THERAPY | Facility: CLINIC | Age: 68
End: 2023-08-10
Payer: MEDICARE

## 2023-08-10 DIAGNOSIS — Z74.09 IMPAIRED FUNCTIONAL MOBILITY, BALANCE, AND ENDURANCE: Primary | ICD-10-CM

## 2023-08-10 DIAGNOSIS — F02.A0 MILD LEWY BODY DEMENTIA, UNSPECIFIED WHETHER BEHAVIORAL, PSYCHOTIC, OR MOOD DISTURBANCE OR ANXIETY: ICD-10-CM

## 2023-08-10 DIAGNOSIS — G31.83 MILD LEWY BODY DEMENTIA, UNSPECIFIED WHETHER BEHAVIORAL, PSYCHOTIC, OR MOOD DISTURBANCE OR ANXIETY: ICD-10-CM

## 2023-08-10 PROCEDURE — 97112 NEUROMUSCULAR REEDUCATION: CPT | Performed by: PHYSICAL THERAPIST

## 2023-08-10 PROCEDURE — 97530 THERAPEUTIC ACTIVITIES: CPT | Performed by: PHYSICAL THERAPIST

## 2023-08-10 PROCEDURE — 97110 THERAPEUTIC EXERCISES: CPT | Performed by: PHYSICAL THERAPIST

## 2023-08-10 NOTE — PROGRESS NOTES
Physical Therapy Daily Treatment Note    Patient: Genevieve Kuo   : 1955  Diagnosis/ICD-10 Code:  Impaired functional mobility, balance, and endurance [Z74.09]  Referring practitioner: Joseph F Seipel, MD  Date of Initial Visit: Type: THERAPY  Noted: 2023  Today's Date: 8/10/2023  Patient seen for 7 sessions             Subjective Patient reports she's feeling dizzy and wobbly, has had more tremors the past couple days.  No falls since last visit but several close calls.    Objective   See Exercise, Manual, and Modality Logs for complete treatment. Patient lists to L in sitting and standing and is aware. Re-assessed TUG up and go with a time of 18.66 sec with standard cane (goal = 12 sec, 19 sec at eval).  Reviewed posture on wall and static standing on decline to encourage a posterior center of gravity to help with balance. VC for slow and controlled movement rather than fast and shortened movement. Stepping strategies still challenging for left backward today.  Able to maintain balance on level ground with NBOS and EC, progressing with WBOS on foam with EC this visit with good response. No LOB today but patient having tremors lately, seems pale, shaky.         Assessment/Plan  Patient independent with HEP in 2 weeks - MET  Tolerate 10 min Nustep in 2 weeks - MET  Able to SLS 10 sec B in 2 weeks - MET  Able to maintain balance with sternal nudge x 3 with NBOS in 2 weeks - MET  Able to ambulate independently with cane and minimal LOB in 2 weeks - MET  Improve function as evidenced by Tinetti gait+balance score of 20% or less in 12 weeks (48% impairment initially) - NOT MET  Able to initiate gait without delay in 12 weeks - MET  Perform 10 reps of repeated sit to stand without arms in 30 seconds in 12 weeks - MET  Able to balance with NBOS eyes closed in 12 weeks - MET    Progress per Plan of Care exp 23           Timed:         Manual Therapy:         mins  17686;     Therapeutic Exercise:     10     mins  12465;     Neuromuscular Silvana:    15    mins  95008;    Therapeutic Activity:     15     mins  40274;     Gait Training:           mins  10672;     Ultrasound:          mins  28402;    Ionto                                   mins   08854  Self Care                            mins   53754      Un-Timed:  Electrical Stimulation:         mins  60970 ( );  Dry Needling          mins self-pay  Traction          mins 77215  Low Eval          Mins  33497  Mod Eval          Mins  27383  High Eval                            Mins  44555  Canalith Repos                   mins  02102    Timed Treatment:   40   mins   Total Treatment:     40   mins    Robin A Sprigler, PT  Physical Therapist

## 2023-08-14 ENCOUNTER — TREATMENT (OUTPATIENT)
Dept: PHYSICAL THERAPY | Facility: CLINIC | Age: 68
End: 2023-08-14
Payer: MEDICARE

## 2023-08-14 DIAGNOSIS — G31.83 MILD LEWY BODY DEMENTIA, UNSPECIFIED WHETHER BEHAVIORAL, PSYCHOTIC, OR MOOD DISTURBANCE OR ANXIETY: ICD-10-CM

## 2023-08-14 DIAGNOSIS — F02.A0 MILD LEWY BODY DEMENTIA, UNSPECIFIED WHETHER BEHAVIORAL, PSYCHOTIC, OR MOOD DISTURBANCE OR ANXIETY: ICD-10-CM

## 2023-08-14 DIAGNOSIS — Z74.09 IMPAIRED FUNCTIONAL MOBILITY, BALANCE, AND ENDURANCE: Primary | ICD-10-CM

## 2023-08-14 PROCEDURE — 97112 NEUROMUSCULAR REEDUCATION: CPT | Performed by: PHYSICAL THERAPIST

## 2023-08-14 PROCEDURE — 97530 THERAPEUTIC ACTIVITIES: CPT | Performed by: PHYSICAL THERAPIST

## 2023-08-14 PROCEDURE — 97110 THERAPEUTIC EXERCISES: CPT | Performed by: PHYSICAL THERAPIST

## 2023-08-14 NOTE — PROGRESS NOTES
Physical Therapy Daily Treatment Note    Patient: Gneevieve Kuo   : 1955  Diagnosis/ICD-10 Code:  Impaired functional mobility, balance, and endurance [Z74.09]  Referring practitioner: Joseph F Seipel, MD  Date of Initial Visit: Type: THERAPY  Noted: 2023  Today's Date: 2023  Patient seen for 8 sessions             Subjective Patient feeling better today than last visit.  She had a busy but good weekend.      Objective   See Exercise, Manual, and Modality Logs for complete treatment. Patient lists to L in sitting and standing and is aware. Re-assessed TUG up and go with a time of 18.66 sec with standard cane (goal = 12 sec, 19 sec at eval).  Reviewed posture on wall and static standing on decline to encourage a posterior center of gravity to help with balance. VC for slow and controlled movement rather than fast and shortened movement. Stepping strategies still challenging for left backward today.  SLS = 17 sec L and 24 sec R.  Able to maintain balance on level ground with NBOS and EC, progressing with WBOS on foam with EC this visit with good response. No LOB or tremors noted today. Better energy level.      Assessment/Plan  Patient independent with HEP in 2 weeks - MET  Tolerate 10 min Nustep in 2 weeks - MET  Able to SLS 10 sec B in 2 weeks - MET  Able to maintain balance with sternal nudge x 3 with NBOS in 2 weeks - MET  Able to ambulate independently with cane and minimal LOB in 2 weeks - MET  Improve function as evidenced by Tinetti gait+balance score of 20% or less in 12 weeks (48% impairment initially) - NOT MET  Able to initiate gait without delay in 12 weeks - MET  Perform 10 reps of repeated sit to stand without arms in 30 seconds in 12 weeks - MET  Able to balance with NBOS eyes closed in 12 weeks - MET    Progress per Plan of Care exp 23           Timed:         Manual Therapy:         mins  40532;     Therapeutic Exercise:    10     mins  38229;     Neuromuscular Silvana:    15     mins  16431;    Therapeutic Activity:     15     mins  81583;     Gait Training:           mins  63953;     Ultrasound:          mins  29381;    Ionto                                   mins   78540  Self Care                            mins   18806      Un-Timed:  Electrical Stimulation:         mins  91257 ( );  Dry Needling          mins self-pay  Traction          mins 48653  Low Eval          Mins  13073  Mod Eval          Mins  94509  High Eval                            Mins  34036  Canalith Repos                   mins  03590    Timed Treatment:   40   mins   Total Treatment:     40   mins    Robin A Sprigler, PT  Physical Therapist

## 2023-08-17 ENCOUNTER — TREATMENT (OUTPATIENT)
Dept: PHYSICAL THERAPY | Facility: CLINIC | Age: 68
End: 2023-08-17
Payer: MEDICARE

## 2023-08-17 DIAGNOSIS — Z74.09 IMPAIRED FUNCTIONAL MOBILITY, BALANCE, AND ENDURANCE: Primary | ICD-10-CM

## 2023-08-17 DIAGNOSIS — F02.A0 MILD LEWY BODY DEMENTIA, UNSPECIFIED WHETHER BEHAVIORAL, PSYCHOTIC, OR MOOD DISTURBANCE OR ANXIETY: ICD-10-CM

## 2023-08-17 DIAGNOSIS — G31.83 MILD LEWY BODY DEMENTIA, UNSPECIFIED WHETHER BEHAVIORAL, PSYCHOTIC, OR MOOD DISTURBANCE OR ANXIETY: ICD-10-CM

## 2023-08-17 PROCEDURE — 97530 THERAPEUTIC ACTIVITIES: CPT | Performed by: PHYSICAL THERAPIST

## 2023-08-17 PROCEDURE — 97112 NEUROMUSCULAR REEDUCATION: CPT | Performed by: PHYSICAL THERAPIST

## 2023-08-17 PROCEDURE — 97110 THERAPEUTIC EXERCISES: CPT | Performed by: PHYSICAL THERAPIST

## 2023-08-17 NOTE — PROGRESS NOTES
Discharge Summary  Discharge Summary from Physical Therapy Report      Goals: All Met    Discharge Plan: Continue with current home exercise program as instructed    Comments Continue with self-management and HEP.    Date of Discharge 23        Robin A Sprigler, CELESTINO  Physical Therapist             Physical Therapy Daily Treatment Note    Patient: Genevieve Kuo   : 1955  Diagnosis/ICD-10 Code:  Impaired functional mobility, balance, and endurance [Z74.09]  Referring practitioner: Joseph F Seipel, MD  Date of Initial Visit: Type: THERAPY  Noted: 2023  Today's Date: 2023  Patient seen for 9 sessions             Subjective Patient slept all day yesterday but feeling good today.  Patient voices readiness for discharge with self-management and HEP.    Objective   See Exercise, Manual, and Modality Logs for complete treatment. Patient lists to L in sitting and standing and is aware. Re-assessed TUG up and go with a time of 18.66 sec with standard cane (goal = 12 sec, 19 sec at eval).  Reviewed posture on wall and static standing on decline to encourage a posterior center of gravity to help with balance. VC for slow and controlled movement rather than fast and shortened movement. Stepping strategies still challenging for left backward today.  SLS = 17 sec L and 24 sec R.  Able to maintain balance on level ground with NBOS and EC, progressing with WBOS on foam with EC this visit with good response. No LOB or tremors noted today. Better energy level.       Assessment/Plan  Patient independent with HEP in 2 weeks - MET  Tolerate 10 min Nustep in 2 weeks - MET  Able to SLS 10 sec B in 2 weeks - MET  Able to maintain balance with sternal nudge x 3 with NBOS in 2 weeks - MET  Able to ambulate independently with cane and minimal LOB in 2 weeks - MET  Improve function as evidenced by Tinetti gait+balance score of 20% or less in 12 weeks (48% impairment initially) - MET  Able to initiate gait without delay in  12 weeks - MET  Perform 10 reps of repeated sit to stand without arms in 30 seconds in 12 weeks - MET  Able to balance with NBOS eyes closed in 12 weeks - MET    Progress per Plan of Care exp 9/28/23           Timed:         Manual Therapy:         mins  08409;     Therapeutic Exercise:    10     mins  77143;     Neuromuscular Silvana:    15    mins  27191;    Therapeutic Activity:     15     mins  18774;     Gait Training:           mins  19475;     Ultrasound:          mins  31844;    Ionto                                   mins   23821  Self Care                            mins   97798      Un-Timed:  Electrical Stimulation:         mins  00539 ( );  Dry Needling          mins self-pay  Traction          mins 51541  Low Eval          Mins  85171  Mod Eval          Mins  45135  High Eval                            Mins  91674  Canalith Repos                   mins  45095    Timed Treatment:   40   mins   Total Treatment:     40   mins    Robin A Sprigler, PT  Physical Therapist

## 2023-10-16 ENCOUNTER — OFFICE VISIT (OUTPATIENT)
Dept: FAMILY MEDICINE CLINIC | Facility: CLINIC | Age: 68
End: 2023-10-16
Payer: MEDICARE

## 2023-10-16 VITALS
HEIGHT: 64 IN | HEART RATE: 82 BPM | DIASTOLIC BLOOD PRESSURE: 90 MMHG | BODY MASS INDEX: 34.42 KG/M2 | TEMPERATURE: 97.1 F | WEIGHT: 201.6 LBS | RESPIRATION RATE: 18 BRPM | SYSTOLIC BLOOD PRESSURE: 132 MMHG | OXYGEN SATURATION: 94 %

## 2023-10-16 DIAGNOSIS — R35.0 URINARY FREQUENCY: ICD-10-CM

## 2023-10-16 DIAGNOSIS — J30.1 CHRONIC SEASONAL ALLERGIC RHINITIS DUE TO POLLEN: ICD-10-CM

## 2023-10-16 DIAGNOSIS — F41.9 ANXIETY AND DEPRESSION: ICD-10-CM

## 2023-10-16 DIAGNOSIS — E66.09 CLASS 1 OBESITY DUE TO EXCESS CALORIES WITH SERIOUS COMORBIDITY AND BODY MASS INDEX (BMI) OF 34.0 TO 34.9 IN ADULT: ICD-10-CM

## 2023-10-16 DIAGNOSIS — R73.9 HYPERGLYCEMIA: ICD-10-CM

## 2023-10-16 DIAGNOSIS — G31.83 MILD LEWY BODY DEMENTIA WITHOUT BEHAVIORAL DISTURBANCE, PSYCHOTIC DISTURBANCE, MOOD DISTURBANCE, OR ANXIETY: ICD-10-CM

## 2023-10-16 DIAGNOSIS — Z23 NEED FOR INFLUENZA VACCINATION: ICD-10-CM

## 2023-10-16 DIAGNOSIS — E78.2 MIXED HYPERLIPIDEMIA: ICD-10-CM

## 2023-10-16 DIAGNOSIS — E88.810 METABOLIC SYNDROME: Primary | ICD-10-CM

## 2023-10-16 DIAGNOSIS — F32.A ANXIETY AND DEPRESSION: ICD-10-CM

## 2023-10-16 DIAGNOSIS — F02.A0 MILD LEWY BODY DEMENTIA WITHOUT BEHAVIORAL DISTURBANCE, PSYCHOTIC DISTURBANCE, MOOD DISTURBANCE, OR ANXIETY: ICD-10-CM

## 2023-10-16 LAB
BILIRUB BLD-MCNC: NEGATIVE MG/DL
CLARITY, POC: CLEAR
COLOR UR: YELLOW
EXPIRATION DATE: ABNORMAL
GLUCOSE BLDC GLUCOMTR-MCNC: 124 MG/DL (ref 70–130)
GLUCOSE UR STRIP-MCNC: NEGATIVE MG/DL
HBA1C MFR BLD: 6.3 % (ref 4.5–5.7)
KETONES UR QL: NEGATIVE
LEUKOCYTE EST, POC: ABNORMAL
Lab: ABNORMAL
NITRITE UR-MCNC: NEGATIVE MG/ML
PH UR: 5 [PH] (ref 5–8)
PROT UR STRIP-MCNC: ABNORMAL MG/DL
RBC # UR STRIP: NEGATIVE /UL
SP GR UR: 1.02 (ref 1–1.03)
UROBILINOGEN UR QL: NORMAL

## 2023-10-16 PROCEDURE — 82948 REAGENT STRIP/BLOOD GLUCOSE: CPT | Performed by: FAMILY MEDICINE

## 2023-10-16 PROCEDURE — 81002 URINALYSIS NONAUTO W/O SCOPE: CPT | Performed by: FAMILY MEDICINE

## 2023-10-16 PROCEDURE — 83036 HEMOGLOBIN GLYCOSYLATED A1C: CPT | Performed by: FAMILY MEDICINE

## 2023-10-16 PROCEDURE — 90662 IIV NO PRSV INCREASED AG IM: CPT | Performed by: FAMILY MEDICINE

## 2023-10-16 PROCEDURE — 3044F HG A1C LEVEL LT 7.0%: CPT | Performed by: FAMILY MEDICINE

## 2023-10-16 PROCEDURE — 99214 OFFICE O/P EST MOD 30 MIN: CPT | Performed by: FAMILY MEDICINE

## 2023-10-16 PROCEDURE — G0008 ADMIN INFLUENZA VIRUS VAC: HCPCS | Performed by: FAMILY MEDICINE

## 2023-10-16 RX ORDER — ALBUTEROL SULFATE 90 UG/1
2 AEROSOL, METERED RESPIRATORY (INHALATION) EVERY 4 HOURS PRN
Qty: 18 G | Refills: 12 | Status: SHIPPED | OUTPATIENT
Start: 2023-10-16

## 2023-10-16 RX ORDER — ALBUTEROL SULFATE 2.5 MG/3ML
SOLUTION RESPIRATORY (INHALATION) AS NEEDED
Status: CANCELLED | OUTPATIENT
Start: 2023-10-16

## 2023-10-16 NOTE — PROGRESS NOTES
Answers submitted by the patient for this visit:  Primary Reason for Visit (Submitted on 10/15/2023)  What is the primary reason for your visit?: Diabetes  Chief Complaint  Diabetes, Hyperlipidemia, and Obesity    Subjective     CC  Problem List  Visit Diagnosis   Encounters  Notes  Medications  Labs  Result Review Imaging  Media    Genevieve Kuo presents to [facility] for   History of Present Illness  hyp  Hyperlipidemia  This is a chronic problem. The current episode started more than 1 year ago. The problem is controlled. Recent lipid tests were reviewed and are high. Exacerbating diseases include diabetes, hypothyroidism and obesity. Pertinent negatives include no chest pain, focal sensory loss, focal weakness, leg pain, myalgias or shortness of breath. Current antihyperlipidemic treatment includes statins. The current treatment provides moderate improvement of lipids. There are no compliance problems.  Risk factors for coronary artery disease include diabetes mellitus, dyslipidemia, family history, obesity and post-menopausal.   Blood Sugar Problem  This is a chronic problem. The current episode started more than 1 year ago. The problem occurs daily. The problem has been gradually worsening. Associated symptoms include fatigue and vertigo. Pertinent negatives include no abdominal pain, anorexia, arthralgias, change in bowel habit, chest pain, chills, congestion, coughing, diaphoresis, fever, headaches, joint swelling, myalgias, nausea, neck pain, numbness, rash, sore throat, swollen glands, urinary symptoms, visual change, vomiting or weakness. Associated symptoms comments:  . The symptoms are aggravated by eating and drinking. Treatments tried: Diet controlled. The treatment provided no relief.   Hypothyroidism  This is a chronic problem. The current episode started more than 1 year ago. Associated symptoms include fatigue and vertigo. Pertinent negatives include no  abdominal pain, anorexia, arthralgias, change in bowel habit, chest pain, chills, congestion, coughing, diaphoresis, fever, headaches, joint swelling, myalgias, nausea, neck pain, numbness, rash, sore throat, swollen glands, urinary symptoms, visual change, vomiting or weakness. Treatments tried: Levothyroxine 75mcg. The treatment provided moderate relief.   Diabetes  She has type 1 diabetes mellitus. No MedicAlert identification noted. The initial diagnosis of diabetes was made 18 months ago. Pertinent negatives for hypoglycemia include no confusion, headaches, nervousness/anxiousness, speech difficulty or sweats. Associated symptoms include fatigue. Pertinent negatives for diabetes include no blurred vision, no chest pain, no foot paresthesias, no foot ulcerations, no polydipsia, no polyuria, no visual change, no weakness and no weight loss. Pertinent negatives for hypoglycemia complications include no blackouts, no hospitalization, no nocturnal hypoglycemia, no required assistance and no required glucagon injection. Symptoms are stable. She is compliant with treatment some of the time. Insulin injections are given by adult caretaker and relative. She is following a diabetic and high fat/cholesterol diet. When asked about meal planning, she reported none. She rarely participates in exercise. Blood glucose monitoring compliance is adequate. Her overall blood glucose range is 130-140 mg/dl. She does not see a podiatrist.Eye exam is current.   Dementia  Associated symptoms include fatigue and vertigo. Pertinent negatives include no abdominal pain, anorexia, arthralgias, change in bowel habit, chest pain, chills, congestion, coughing, diaphoresis, fever, headaches, joint swelling, myalgias, nausea, neck pain, numbness, rash, sore throat, swollen glands, urinary symptoms, visual change, vomiting or weakness.       Review of Systems   Constitutional:  Positive for fatigue. Negative for chills, diaphoresis, fever and  "unexpected weight loss.   HENT:  Negative for congestion, sore throat and swollen glands.    Eyes:  Negative for blurred vision and visual disturbance.   Respiratory:  Negative for cough and shortness of breath.    Cardiovascular:  Negative for chest pain.   Gastrointestinal:  Negative for abdominal pain, anorexia, change in bowel habit, constipation, diarrhea, nausea and vomiting.   Endocrine: Negative for cold intolerance, heat intolerance, polydipsia and polyuria.   Genitourinary:  Negative for dysuria.   Musculoskeletal:  Negative for arthralgias, joint swelling, myalgias and neck pain.   Skin:  Negative for rash.   Neurological:  Positive for vertigo and memory problem. Negative for focal weakness, speech difficulty, weakness, numbness and confusion.   Hematological:  Negative for adenopathy. Does not bruise/bleed easily.   Psychiatric/Behavioral:  Negative for depressed mood. The patient is not nervous/anxious.         Objective   Vital Signs:   /90 (BP Location: Left arm, Patient Position: Sitting, Cuff Size: Adult)   Pulse 82   Temp 97.1 °F (36.2 °C) (Infrared)   Resp 18   Ht 162.6 cm (64\")   Wt 91.4 kg (201 lb 9.6 oz)   SpO2 94% Comment: roomair  BMI 34.60 kg/m²     Physical Exam  Constitutional:       General: She is not in acute distress.     Appearance: She is obese.   Eyes:      Pupils: Pupils are equal, round, and reactive to light.      Comments: Fundi benign   Cardiovascular:      Rate and Rhythm: Normal rate and regular rhythm.      Heart sounds: No murmur heard.  Pulmonary:      Effort: Pulmonary effort is normal.      Breath sounds: Normal breath sounds. No wheezing.   Musculoskeletal:      Cervical back: Neck supple.      Right lower leg: No edema.      Left lower leg: No edema.   Lymphadenopathy:      Cervical: No cervical adenopathy.   Skin:     Findings: No rash.   Neurological:      General: No focal deficit present.      Mental Status: She is alert and oriented to person, place, " and time.      Sensory: No sensory deficit.      Motor: No weakness.      Coordination: Coordination abnormal.      Gait: Gait abnormal (wide based.).      Comments: She is very knowledgeable to date regarding Lewy body dementia.         Result Review :Labs  Result Review  Imaging  Med Tab  Media                 Assessment and Plan CC Problem List  Visit Diagnosis  ROS  Review (Popup)  Health Maintenance  Quality  BestPractice  Medications  SmartSets  SnapShot Encounters  Media  Problem List Items Addressed This Visit          High    Anxiety and depression    Overview     Controlled continue escitalopram and wellbutrin sxs worse at night. F.U 3 mos         Metabolic syndrome - Primary    Overview     A1c 6.3 10/16/2023 worse, she will imrprove diet and exercise.   A1c 6.1 07/10/2023   A1c 6.2 04/03/2023  A1c 6.0 01/03/2023 begin metformin  A1c 5.9 08/14/2022 continued improvement continue healthy diet and exercise  A1c 6.0  06/14/2022  A1c 6.5 01/2022 Diet and exercise discussed. It's importance is understood.             Mild Lewy body dementia    Overview     On Aricept 10 mg doing well. Dx 2022, mild hallucinations and unsteady gait. Loss of short term memory seems stable.  doing well.   Followed with Seipel q 6 mos              Low    Mixed hyperlipidemia    Overview     decreased HDL, 31,  Compliant with Statin Rx         Chronic seasonal allergic rhinitis due to pollen    Overview     with reactive airways  controlled with maximum Rx, continued/refilled         Relevant Medications    albuterol sulfate  (90 Base) MCG/ACT inhaler    Class 1 obesity due to excess calories with serious comorbidity and body mass index (BMI) of 34.0 to 34.9 in adult    Overview     Healthy diet and regular exercise and wt loss encouraged.          Current Assessment & Plan     Patient's (Body mass index is 34.6 kg/m².) indicates that they are obese (BMI >30) with health conditions that include hypertension  and dyslipidemias . Weight is unchanged. BMI  is above average; BMI management plan is completed. We discussed low calorie, low carb based diet program, portion control, and increasing exercise.           Other Visit Diagnoses       Hyperglycemia        Relevant Orders    POCT Glucose (Completed)    POC Glycosylated Hemoglobin (Hb A1C) (Completed)    POCT urinalysis dipstick, manual (Completed)    Urinary frequency        Need for influenza vaccination        Relevant Orders    Fluzone High-Dose 65+yrs (Completed)            Follow Up Instructions Charge Capture  Follow-up Communications  Return in about 3 months (around 1/16/2024).  Patient was given instructions and counseling regarding her condition or for health maintenance advice. Please see specific information pulled into the AVS if appropriate.

## 2023-10-17 NOTE — ASSESSMENT & PLAN NOTE
Patient's (Body mass index is 34.6 kg/m².) indicates that they are obese (BMI >30) with health conditions that include hypertension and dyslipidemias . Weight is unchanged. BMI  is above average; BMI management plan is completed. We discussed low calorie, low carb based diet program, portion control, and increasing exercise.

## 2023-11-10 DIAGNOSIS — F02.A0 MILD LEWY BODY DEMENTIA WITHOUT BEHAVIORAL DISTURBANCE, PSYCHOTIC DISTURBANCE, MOOD DISTURBANCE, OR ANXIETY: ICD-10-CM

## 2023-11-10 DIAGNOSIS — G31.83 MILD LEWY BODY DEMENTIA WITHOUT BEHAVIORAL DISTURBANCE, PSYCHOTIC DISTURBANCE, MOOD DISTURBANCE, OR ANXIETY: ICD-10-CM

## 2023-11-13 RX ORDER — OXYBUTYNIN CHLORIDE 15 MG/1
15 TABLET, EXTENDED RELEASE ORAL DAILY
Qty: 90 TABLET | Refills: 3 | Status: SHIPPED | OUTPATIENT
Start: 2023-11-13

## 2023-11-13 NOTE — PROGRESS NOTES
"Chief Complaint  Sleep Apnea    Subjective          Genevieve Kuo presents to [facility]  History of Present Illness  CAMILA f/u patient states she is not using her CPAP.    She can't tolerate the mask  Patient is still taking aricept 10 mg   Short term memory is still a problem  Pt has had visual hallucinations in the past,  Notes on occasion to have tremors, shaky in morning.   Some days better than others.   has acted out dreams  Tried melatonin did not help  Does not want to take clonazepam as not bad enough      Sleep testing history:    On NPSG at [facility] , 6/6/22 patient had Mild obstructive sleep apnea syndrome with apnea-hypopnea index of 12.2 per sleep hour, minimum SpO2 of 80%    PAP download: pt not able to use CPAP due to anxiety so will discontinue    Dana Sleepiness Scale:  Sitting and reading 2 WatchingTV 2  Sitting, inactive, in a public place 0  As a passenger in a car for 1 hour w/o a break  2  Lying down to rest in the afternoon  2  Sitting and talking to someone  0  Sitting quietly after a lunch  2  In a car, while stopped for traffic or a light  0  Total 10    Review of Systems   Constitutional:  Positive for fatigue.   HENT:  Positive for hearing loss, mouth sores, tinnitus, trouble swallowing and voice change.    Respiratory:  Positive for apnea.    Endocrine: Negative.    Genitourinary: Negative.    Musculoskeletal:  Positive for neck pain and neck stiffness.   Allergic/Immunologic: Positive for environmental allergies.   Neurological:  Positive for dizziness, tremors and light-headedness.   Psychiatric/Behavioral:  Positive for decreased concentration, hallucinations and sleep disturbance.    All other systems reviewed and are negative.        Objective   Vital Signs:   /79 (BP Location: Left arm, Patient Position: Sitting, Cuff Size: Adult)   Pulse 67   Ht 162.6 cm (64\")   Wt 90.7 kg (200 lb)   BMI 34.33 kg/m²     Physical Exam  Vitals reviewed.   Eyes: "      Conjunctiva/sclera: Conjunctivae normal.   Cardiovascular:      Pulses: Normal pulses.   Pulmonary:      Effort: Pulmonary effort is normal. No respiratory distress.   Neurological:      General: No focal deficit present.      Mental Status: She is alert and oriented to person, place, and time.   Psychiatric:         Mood and Affect: Mood normal.        Result Review :                 Assessment and Plan    Diagnoses and all orders for this visit:    1. MESFIN (obstructive sleep apnea) (Primary)  Overview:  She struggles with CPAP      2. Mild cognitive impairment with memory loss    3. 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 Dx 2022, mild hallucinations and unsteady gait. Loss of short term memory seems stable.  doing well.   Followed with Seipel q 6 mos          Pt has mild mesfin but not able to use CPAP  Has Lewy body disease, will increase Aricept       Follow Up   Return in about 1 year (around 11/14/2024).    Patient was given instructions and counseling regarding her condition or for health maintenance advice. Please see specific information pulled into the AVS if appropriate.       This document has been electronically signed by Joseph Seipel, MD on November 14, 2023 09:18 EST

## 2023-11-14 ENCOUNTER — OFFICE VISIT (OUTPATIENT)
Dept: NEUROLOGY | Facility: CLINIC | Age: 68
End: 2023-11-14
Payer: MEDICARE

## 2023-11-14 VITALS
HEART RATE: 67 BPM | DIASTOLIC BLOOD PRESSURE: 79 MMHG | SYSTOLIC BLOOD PRESSURE: 127 MMHG | BODY MASS INDEX: 34.15 KG/M2 | WEIGHT: 200 LBS | HEIGHT: 64 IN

## 2023-11-14 DIAGNOSIS — G47.33 OSA (OBSTRUCTIVE SLEEP APNEA): Primary | ICD-10-CM

## 2023-11-14 DIAGNOSIS — F02.A0 MILD LEWY BODY DEMENTIA WITHOUT BEHAVIORAL DISTURBANCE, PSYCHOTIC DISTURBANCE, MOOD DISTURBANCE, OR ANXIETY: ICD-10-CM

## 2023-11-14 DIAGNOSIS — G31.83 MILD LEWY BODY DEMENTIA WITHOUT BEHAVIORAL DISTURBANCE, PSYCHOTIC DISTURBANCE, MOOD DISTURBANCE, OR ANXIETY: ICD-10-CM

## 2023-11-14 DIAGNOSIS — G31.84 MILD COGNITIVE IMPAIRMENT WITH MEMORY LOSS: ICD-10-CM

## 2023-11-14 PROCEDURE — 99214 OFFICE O/P EST MOD 30 MIN: CPT | Performed by: PSYCHIATRY & NEUROLOGY

## 2023-11-14 PROCEDURE — 1159F MED LIST DOCD IN RCRD: CPT | Performed by: PSYCHIATRY & NEUROLOGY

## 2023-11-14 PROCEDURE — 1160F RVW MEDS BY RX/DR IN RCRD: CPT | Performed by: PSYCHIATRY & NEUROLOGY

## 2023-11-14 RX ORDER — DONEPEZIL HYDROCHLORIDE 10 MG/1
10 TABLET, FILM COATED ORAL 2 TIMES DAILY
Qty: 180 TABLET | Refills: 3 | Status: SHIPPED | OUTPATIENT
Start: 2023-11-14

## 2023-11-19 DIAGNOSIS — E03.9 ACQUIRED HYPOTHYROIDISM: ICD-10-CM

## 2023-11-20 RX ORDER — LEVOTHYROXINE SODIUM 0.1 MG/1
100 TABLET ORAL DAILY
Qty: 100 TABLET | Refills: 2 | Status: SHIPPED | OUTPATIENT
Start: 2023-11-20

## 2023-12-16 DIAGNOSIS — E78.2 MIXED HYPERLIPIDEMIA: ICD-10-CM

## 2023-12-18 RX ORDER — ATORVASTATIN CALCIUM 10 MG/1
10 TABLET, FILM COATED ORAL DAILY
Qty: 90 TABLET | Refills: 3 | Status: SHIPPED | OUTPATIENT
Start: 2023-12-18

## 2023-12-18 RX ORDER — GABAPENTIN 300 MG/1
300 CAPSULE ORAL 2 TIMES DAILY
Qty: 200 CAPSULE | Refills: 2 | Status: SHIPPED | OUTPATIENT
Start: 2023-12-18

## 2023-12-25 DIAGNOSIS — J30.1 CHRONIC SEASONAL ALLERGIC RHINITIS DUE TO POLLEN: ICD-10-CM

## 2023-12-26 RX ORDER — MONTELUKAST SODIUM 10 MG/1
10 TABLET ORAL DAILY
Qty: 100 TABLET | Refills: 2 | Status: SHIPPED | OUTPATIENT
Start: 2023-12-26

## 2023-12-29 ENCOUNTER — TELEPHONE (OUTPATIENT)
Dept: FAMILY MEDICINE CLINIC | Facility: CLINIC | Age: 68
End: 2023-12-29

## 2023-12-29 DIAGNOSIS — R35.0 URINARY FREQUENCY: Primary | ICD-10-CM

## 2023-12-29 RX ORDER — SULFAMETHOXAZOLE AND TRIMETHOPRIM 800; 160 MG/1; MG/1
1 TABLET ORAL 2 TIMES DAILY
Qty: 20 TABLET | Refills: 0 | Status: SHIPPED | OUTPATIENT
Start: 2023-12-29

## 2023-12-29 NOTE — TELEPHONE ENCOUNTER
PATIENT CALLED FOR MEDICATION FOR LOW  GRADE FEVER, BURNING AND FREQUENT URINATION,    SHE WAS TOLD TO ALL IN FOR UTI SYMPTOMS.    [id number] - [place], [place] - [address] [facility] - [phone]  - [phone]  [phone]     CALL BACK NUMBER [phone]    OVER THE COUNTER TEST SHOWED POSITIVE

## 2023-12-29 NOTE — TELEPHONE ENCOUNTER
Called and left detailed voicemail letting patient know that Dr. Fishman sent medication to pharmacy

## 2024-01-17 NOTE — PROGRESS NOTES
Chief Complaint  Hyperlipidemia, Prediabetes, and Dementia    Subjective     CC  Problem List  Visit Diagnosis   Encounters  Notes  Medications  Labs  Result Review Imaging  Media    Genevieve Kuo presents to [facility] for   Hyperlipidemia  This is a chronic problem. The current episode started more than 1 year ago. The problem is controlled. Recent lipid tests were reviewed and are high. Pertinent negatives include no chest pain, focal sensory loss, leg pain or shortness of breath. Current antihyperlipidemic treatment includes statins. The current treatment provides moderate improvement of lipids. There are no compliance problems.  Risk factors for coronary artery disease include diabetes mellitus, dyslipidemia, family history, obesity and post-menopausal.   Blood Sugar Problem  This is a chronic problem. The current episode started more than 1 year ago. The problem occurs daily. The problem has been gradually worsening. Pertinent negatives include no abdominal pain, chest pain, fever, nausea, rash or vomiting. Associated symptoms comments:  . The symptoms are aggravated by eating and drinking. Treatments tried: Diet controlled. The treatment provided no relief.       Review of Systems   Constitutional:  Negative for fever and unexpected weight loss.   Respiratory:  Negative for shortness of breath and wheezing.    Cardiovascular:  Negative for chest pain, palpitations and leg swelling.   Gastrointestinal:  Negative for abdominal pain, constipation, diarrhea, nausea and vomiting.   Endocrine: Negative for cold intolerance, heat intolerance, polydipsia and polyuria.   Genitourinary:  Negative for dysuria.   Skin:  Negative for rash.   Hematological:  Negative for adenopathy. Does not bruise/bleed easily.        Objective   Vital Signs:   /84 (BP Location: Right arm, Patient Position: Sitting, Cuff Size: Adult)   Pulse 93   Temp 97.3 °F (36.3 °C) (Temporal)   Resp  "16   Ht 162.6 cm (64\")   Wt 91.1 kg (200 lb 12.8 oz)   SpO2 96% Comment: room air  BMI 34.47 kg/m²     Physical Exam  Constitutional:       General: She is not in acute distress.     Appearance: She is obese.   Eyes:      Pupils: Pupils are equal, round, and reactive to light.      Comments: Fundi benign   Cardiovascular:      Rate and Rhythm: Normal rate and regular rhythm.      Heart sounds: No murmur heard.  Pulmonary:      Effort: Pulmonary effort is normal.      Breath sounds: Normal breath sounds. No wheezing.   Musculoskeletal:      Cervical back: Neck supple.      Right lower leg: No edema.      Left lower leg: No edema.   Lymphadenopathy:      Cervical: No cervical adenopathy.   Skin:     Findings: No rash.   Neurological:      General: No focal deficit present.      Mental Status: She is alert and oriented to person, place, and time.      Sensory: No sensory deficit.      Motor: No weakness.      Coordination: Coordination abnormal.      Gait: Gait abnormal (wide based.).      Comments: She is very knowledgeable to date regarding Lewy body dementia.         Result Review :Labs  Result Review  Imaging  Med Tab  Media     This medical care serves as the continuing focal point of all needed health care services.              Assessment and Plan CC Problem List  Visit Diagnosis  ROS  Review (Popup)  Health Maintenance  Quality  BestPractice  Medications  SmartSets  SnapShot Encounters  Media  Problem List Items Addressed This Visit          High    Anxiety and depression    Overview     Controlled continue escitalopram and wellbutrin          Prediabetes - Primary    Overview     A1c 6.4 01/18/2024 worse,   A1c 6.3 10/16/2023 worse, she will imrprove diet and exercise.   A1c 6.1 07/10/2023   A1c 6.2 04/03/2023  A1c 6.0 01/03/2023 begin metformin  A1c 5.9 08/14/2022 continued improvement continue healthy diet and exercise  A1c 6.0  06/14/2022  A1c 6.5 01/2022 Diet and exercise discussed. " It's importance is understood.             Current Assessment & Plan     Improved diet exercise and begin ozemic compounded and follow.          Mild Lewy body dementia    Overview     On Aricept 10 mg doing well. Dx 2022,   mild hallucinations and unsteady gait.   Loss of short term memory seems stable.    Followed with [facility] q 6 mos              Low    Mixed hyperlipidemia    Overview     decreased HDL, 31,  Compliant with Statin Rx         Class 1 obesity due to excess calories with serious comorbidity and body mass index (BMI) of 34.0 to 34.9 in adult    Overview     Healthy diet and regular exercise and wt loss encouraged.          Current Assessment & Plan     Patient's (Body mass index is 34.47 kg/m².) indicates that they are obese (BMI >30) with health conditions that include hypertension, diabetes mellitus, and dyslipidemias . Weight is unchanged. BMI  is above average; BMI management plan is completed. We discussed low calorie, low carb based diet program, portion control, and increasing exercise.           Other Visit Diagnoses       Hyperglycemia        Relevant Orders    POCT Glucose (Completed)    POC Glycosylated Hemoglobin (Hb A1C) (Completed)    POCT urinalysis dipstick, manual (Completed)            Follow Up Instructions Charge Capture  Follow-up Communications  Return in about 3 months (around 4/19/2024).  Patient was given instructions and counseling regarding her condition or for health maintenance advice. Please see specific information pulled into the AVS if appropriate.

## 2024-01-19 ENCOUNTER — OFFICE VISIT (OUTPATIENT)
Dept: FAMILY MEDICINE CLINIC | Facility: CLINIC | Age: 69
End: 2024-01-19
Payer: COMMERCIAL

## 2024-01-19 VITALS
RESPIRATION RATE: 16 BRPM | WEIGHT: 200.8 LBS | BODY MASS INDEX: 34.28 KG/M2 | OXYGEN SATURATION: 96 % | DIASTOLIC BLOOD PRESSURE: 84 MMHG | HEIGHT: 64 IN | SYSTOLIC BLOOD PRESSURE: 126 MMHG | HEART RATE: 93 BPM | TEMPERATURE: 97.3 F

## 2024-01-19 DIAGNOSIS — F32.A ANXIETY AND DEPRESSION: ICD-10-CM

## 2024-01-19 DIAGNOSIS — E66.09 CLASS 1 OBESITY DUE TO EXCESS CALORIES WITH SERIOUS COMORBIDITY AND BODY MASS INDEX (BMI) OF 34.0 TO 34.9 IN ADULT: ICD-10-CM

## 2024-01-19 DIAGNOSIS — F41.9 ANXIETY AND DEPRESSION: ICD-10-CM

## 2024-01-19 DIAGNOSIS — R73.9 HYPERGLYCEMIA: ICD-10-CM

## 2024-01-19 DIAGNOSIS — E78.2 MIXED HYPERLIPIDEMIA: ICD-10-CM

## 2024-01-19 DIAGNOSIS — F02.A0 MILD LEWY BODY DEMENTIA WITHOUT BEHAVIORAL DISTURBANCE, PSYCHOTIC DISTURBANCE, MOOD DISTURBANCE, OR ANXIETY: ICD-10-CM

## 2024-01-19 DIAGNOSIS — R73.03 PREDIABETES: Primary | ICD-10-CM

## 2024-01-19 DIAGNOSIS — G31.83 MILD LEWY BODY DEMENTIA WITHOUT BEHAVIORAL DISTURBANCE, PSYCHOTIC DISTURBANCE, MOOD DISTURBANCE, OR ANXIETY: ICD-10-CM

## 2024-01-19 LAB
BILIRUB BLD-MCNC: NEGATIVE MG/DL
CLARITY, POC: CLEAR
COLOR UR: ABNORMAL
EXPIRATION DATE: ABNORMAL
GLUCOSE BLDC GLUCOMTR-MCNC: 102 MG/DL (ref 70–130)
GLUCOSE UR STRIP-MCNC: NEGATIVE MG/DL
HBA1C MFR BLD: 6.4 % (ref 4.5–5.7)
KETONES UR QL: NEGATIVE
LEUKOCYTE EST, POC: NEGATIVE
Lab: ABNORMAL
NITRITE UR-MCNC: NEGATIVE MG/ML
PH UR: 5 [PH] (ref 5–8)
PROT UR STRIP-MCNC: ABNORMAL MG/DL
RBC # UR STRIP: NEGATIVE /UL
SP GR UR: 1.01 (ref 1–1.03)
UROBILINOGEN UR QL: NORMAL

## 2024-01-19 RX ORDER — SEMAGLUTIDE 1.34 MG/ML
0.25 INJECTION, SOLUTION SUBCUTANEOUS WEEKLY
Qty: 4 ML | Refills: 12 | Status: SHIPPED | OUTPATIENT
Start: 2024-01-19

## 2024-01-19 NOTE — ASSESSMENT & PLAN NOTE
Patient's (Body mass index is 34.47 kg/m².) indicates that they are obese (BMI >30) with health conditions that include hypertension, diabetes mellitus, and dyslipidemias . Weight is unchanged. BMI  is above average; BMI management plan is completed. We discussed low calorie, low carb based diet program, portion control, and increasing exercise.

## 2024-02-16 DIAGNOSIS — E78.2 MIXED HYPERLIPIDEMIA: ICD-10-CM

## 2024-02-16 DIAGNOSIS — F41.9 ANXIETY AND DEPRESSION: ICD-10-CM

## 2024-02-16 DIAGNOSIS — F32.A ANXIETY AND DEPRESSION: ICD-10-CM

## 2024-02-16 RX ORDER — BUPROPION HYDROCHLORIDE 300 MG/1
300 TABLET ORAL DAILY
Qty: 90 TABLET | Refills: 4 | Status: SHIPPED | OUTPATIENT
Start: 2024-02-16

## 2024-02-16 RX ORDER — ATORVASTATIN CALCIUM 10 MG/1
10 TABLET, FILM COATED ORAL DAILY
Qty: 90 TABLET | Refills: 3 | Status: SHIPPED | OUTPATIENT
Start: 2024-02-16

## 2024-02-16 RX ORDER — GABAPENTIN 300 MG/1
300 CAPSULE ORAL 2 TIMES DAILY
Qty: 200 CAPSULE | Refills: 2 | Status: SHIPPED | OUTPATIENT
Start: 2024-02-16

## 2024-04-16 NOTE — PROGRESS NOTES
Chief Complaint  Blood Sugar Problem, Hyperlipidemia, and Insomnia    Subjective     CC  Problem List  Visit Diagnosis   Encounters  Notes  Medications  Labs  Result Review Imaging  Media    Genevieve Kuo presents to [facility] for   History of Present Illness  Reports insomnia, getting average 3-4 hours of sleep each night. Present for several years. Occurs nightly. Has tried melatonin, tylenol PM.  Hyperlipidemia  This is a chronic problem. The current episode started more than 1 year ago. The problem is controlled. Recent lipid tests were reviewed and are high. Exacerbating diseases include diabetes, hypothyroidism and obesity. She has no history of chronic renal disease, liver disease or nephrotic syndrome. Pre diabetic. Factors aggravating her hyperlipidemia include fatty foods. Pertinent negatives include no chest pain, focal sensory loss, focal weakness, leg pain, myalgias or shortness of breath. Current antihyperlipidemic treatment includes statins and herbal therapy. The current treatment provides mild improvement of lipids. There are no compliance problems.  Risk factors for coronary artery disease include diabetes mellitus, dyslipidemia, family history, obesity and post-menopausal.   Blood Sugar Problem  This is a chronic problem. The current episode started more than 1 year ago. The problem occurs constantly. The problem has been gradually worsening. Associated symptoms include fatigue. Pertinent negatives include no abdominal pain, anorexia, arthralgias, change in bowel habit, chest pain, chills, congestion, coughing, diaphoresis, fever, headaches, joint swelling, myalgias, nausea, neck pain, numbness, rash, sore throat, swollen glands, urinary symptoms, vertigo, visual change, vomiting or weakness. Associated symptoms comments: dizziness . The symptoms are aggravated by eating and drinking. Treatments tried: Once weekly ozempic- compound.   Insomnia  This is  "a new problem. The current episode started more than 1 month ago. The problem occurs constantly. The problem has been unchanged. Associated symptoms include fatigue. Pertinent negatives include no abdominal pain, anorexia, arthralgias, change in bowel habit, chest pain, chills, congestion, coughing, diaphoresis, fever, headaches, joint swelling, myalgias, nausea, neck pain, numbness, rash, sore throat, swollen glands, urinary symptoms, vertigo, visual change, vomiting or weakness. Nothing aggravates the symptoms. Treatments tried: Tylenol PM, Melatonin. The treatment provided moderate relief.       Review of Systems   Constitutional:  Positive for fatigue. Negative for chills, diaphoresis and fever.   HENT:  Negative for congestion, sore throat and swollen glands.    Eyes:  Negative for visual disturbance.   Respiratory:  Negative for cough and shortness of breath.    Cardiovascular:  Negative for chest pain.   Gastrointestinal:  Negative for abdominal pain, anorexia, change in bowel habit, nausea and vomiting.   Endocrine: Negative for cold intolerance, heat intolerance, polydipsia and polyuria.   Musculoskeletal:  Negative for arthralgias, joint swelling, myalgias and neck pain.   Skin:  Negative for rash.   Neurological:  Negative for vertigo, focal weakness, weakness and numbness.   Hematological:  Negative for adenopathy. Does not bruise/bleed easily.   Psychiatric/Behavioral:  The patient has insomnia.         Objective   Vital Signs:   /70 (BP Location: Right arm, Patient Position: Sitting, Cuff Size: Adult)   Pulse 74   Temp 97.5 °F (36.4 °C) (Temporal)   Resp 18   Ht 167.6 cm (66\")   Wt 85.7 kg (189 lb)   SpO2 100% Comment: room air  BMI 30.51 kg/m²     Physical Exam  Constitutional:       General: She is not in acute distress.     Appearance: She is obese.   Eyes:      Pupils: Pupils are equal, round, and reactive to light.      Comments: Fundi benign   Cardiovascular:      Rate and Rhythm: " Normal rate and regular rhythm.      Heart sounds: No murmur heard.  Pulmonary:      Effort: Pulmonary effort is normal.      Breath sounds: Normal breath sounds. No wheezing.   Musculoskeletal:      Cervical back: Neck supple.      Right lower leg: No edema.      Left lower leg: No edema.   Lymphadenopathy:      Cervical: No cervical adenopathy.   Skin:     Findings: No rash.   Neurological:      General: No focal deficit present.      Mental Status: She is alert and oriented to person, place, and time.      Sensory: No sensory deficit.      Motor: No weakness.      Coordination: Coordination abnormal.      Gait: Gait abnormal (wide based.).      Comments: She is very knowledgeable to date regarding Lewy body dementia.         Result Review :Labs  Result Review  Imaging  Med Tab  Media                 Assessment and Plan CC Problem List  Visit Diagnosis  ROS  Review (Popup)  Health Maintenance  Quality  BestPractice  Medications  SmartSets  SnapShot Encounters  Media  Problem List Items Addressed This Visit          High    Prediabetes    Overview     A1c 5.7 04/23/2024 improved!!! Continue diet and exercise as tolerated.   A1c 6.4 01/18/2024 worse,   A1c 6.3 10/16/2023   A1c 6.1 07/10/2023   A1c 6.2 04/03/2023  A1c 6.0 01/03/2023 begin metformin  A1c 5.9 08/14/2022 continued improvement continue healthy diet and exercise  A1c 6.0  06/14/2022  A1c 6.5 01/2022 Diet and exercise discussed. It's importance is understood.             Mild Lewy body dementia    Overview     On Aricept 10 mg doing well. Dx 2022,   mild hallucinations and unsteady gait.   Loss of short term memory seems stable  Followed with [facility] q 6 mos           Relevant Medications    QUEtiapine (SEROquel) 25 MG tablet       Low    Mixed hyperlipidemia    Overview     decreased HDL, 31,  Compliant with Statin Rx         Class 1 obesity due to excess calories with serious comorbidity and body mass index (BMI) of 30.0 to 30.9 in adult     Overview     Healthy diet and regular exercise and wt loss encouraged.          Current Assessment & Plan     Patient's (Body mass index is 30.51 kg/m².) indicates that they are obese (BMI >30) with health conditions that include obstructive sleep apnea and dyslipidemias . Weight is unchanged. BMI  is above average; BMI management plan is completed. We discussed portion control and increasing exercise.          Primary insomnia    Overview     Begin meds and follow. Pros cons and side effects discussed. Good sleep habits encouraged.          Relevant Medications    QUEtiapine (SEROquel) 25 MG tablet     Other Visit Diagnoses       Hyperglycemia    -  Primary    Relevant Medications    Semaglutide,0.25 or 0.5MG/DOS, (Ozempic, 0.25 or 0.5 MG/DOSE,) 2 MG/1.5ML solution pen-injector    Other Relevant Orders    POC Glycosylated Hemoglobin (Hb A1C) (Completed)    POCT urinalysis dipstick, manual (Completed)            Follow Up Instructions Charge Capture  Follow-up Communications  Return in about 3 months (around 7/23/2024).  Patient was given instructions and counseling regarding her condition or for health maintenance advice. Please see specific information pulled into the AVS if appropriate.

## 2024-04-19 ENCOUNTER — TELEPHONE (OUTPATIENT)
Dept: FAMILY MEDICINE CLINIC | Facility: CLINIC | Age: 69
End: 2024-04-19
Payer: COMMERCIAL

## 2024-04-19 DIAGNOSIS — R35.0 URINARY FREQUENCY: ICD-10-CM

## 2024-04-19 RX ORDER — SULFAMETHOXAZOLE AND TRIMETHOPRIM 800; 160 MG/1; MG/1
1 TABLET ORAL 2 TIMES DAILY
Qty: 20 TABLET | Refills: 0 | Status: SHIPPED | OUTPATIENT
Start: 2024-04-19

## 2024-04-19 NOTE — TELEPHONE ENCOUNTER
Caller: Genevieve Kuo    Relationship: Self    Best call back number: [phone]    What medication are you requesting: MEDICATION FOR A UTI    What are your current symptoms: VAGINAL ITCHING, BURNING, PAIN    How long have you been experiencing symptoms: 1 WEEK    If a prescription is needed, what is your preferred pharmacy and phone number:    [facility]/pharmacy #[id number] - [place], [place] - [address] - [phone]  - [phone]  [phone]

## 2024-04-23 ENCOUNTER — OFFICE VISIT (OUTPATIENT)
Dept: FAMILY MEDICINE CLINIC | Facility: CLINIC | Age: 69
End: 2024-04-23
Payer: COMMERCIAL

## 2024-04-23 VITALS
BODY MASS INDEX: 30.37 KG/M2 | TEMPERATURE: 97.5 F | DIASTOLIC BLOOD PRESSURE: 70 MMHG | RESPIRATION RATE: 18 BRPM | HEART RATE: 74 BPM | WEIGHT: 189 LBS | HEIGHT: 66 IN | SYSTOLIC BLOOD PRESSURE: 128 MMHG | OXYGEN SATURATION: 100 %

## 2024-04-23 DIAGNOSIS — G31.83 MILD LEWY BODY DEMENTIA WITHOUT BEHAVIORAL DISTURBANCE, PSYCHOTIC DISTURBANCE, MOOD DISTURBANCE, OR ANXIETY: ICD-10-CM

## 2024-04-23 DIAGNOSIS — E78.2 MIXED HYPERLIPIDEMIA: ICD-10-CM

## 2024-04-23 DIAGNOSIS — F51.01 PRIMARY INSOMNIA: ICD-10-CM

## 2024-04-23 DIAGNOSIS — R73.03 PREDIABETES: ICD-10-CM

## 2024-04-23 DIAGNOSIS — R73.9 HYPERGLYCEMIA: Primary | ICD-10-CM

## 2024-04-23 DIAGNOSIS — F02.A0 MILD LEWY BODY DEMENTIA WITHOUT BEHAVIORAL DISTURBANCE, PSYCHOTIC DISTURBANCE, MOOD DISTURBANCE, OR ANXIETY: ICD-10-CM

## 2024-04-23 DIAGNOSIS — E66.09 CLASS 1 OBESITY DUE TO EXCESS CALORIES WITH SERIOUS COMORBIDITY AND BODY MASS INDEX (BMI) OF 30.0 TO 30.9 IN ADULT: ICD-10-CM

## 2024-04-23 LAB
BILIRUB BLD-MCNC: NEGATIVE MG/DL
CLARITY, POC: CLEAR
COLOR UR: YELLOW
EXPIRATION DATE: NORMAL
GLUCOSE UR STRIP-MCNC: NEGATIVE MG/DL
HBA1C MFR BLD: 5.7 % (ref 4.5–5.7)
KETONES UR QL: NEGATIVE
LEUKOCYTE EST, POC: ABNORMAL
Lab: NORMAL
NITRITE UR-MCNC: NEGATIVE MG/ML
PH UR: 5 [PH] (ref 5–8)
PROT UR STRIP-MCNC: NEGATIVE MG/DL
RBC # UR STRIP: NEGATIVE /UL
SP GR UR: 1.02 (ref 1–1.03)
UROBILINOGEN UR QL: NORMAL

## 2024-04-23 RX ORDER — SEMAGLUTIDE 1.34 MG/ML
0.25 INJECTION, SOLUTION SUBCUTANEOUS WEEKLY
Qty: 4 ML | Refills: 12 | Status: SHIPPED | OUTPATIENT
Start: 2024-04-23

## 2024-04-23 RX ORDER — QUETIAPINE FUMARATE 25 MG/1
25 TABLET, FILM COATED ORAL NIGHTLY
Qty: 90 TABLET | Refills: 3 | Status: SHIPPED | OUTPATIENT
Start: 2024-04-23

## 2024-04-23 NOTE — ASSESSMENT & PLAN NOTE
Patient's (Body mass index is 30.51 kg/m².) indicates that they are obese (BMI >30) with health conditions that include obstructive sleep apnea and dyslipidemias . Weight is unchanged. BMI  is above average; BMI management plan is completed. We discussed portion control and increasing exercise.

## 2024-05-02 DIAGNOSIS — F02.A0 MILD LEWY BODY DEMENTIA WITHOUT BEHAVIORAL DISTURBANCE, PSYCHOTIC DISTURBANCE, MOOD DISTURBANCE, OR ANXIETY: ICD-10-CM

## 2024-05-02 DIAGNOSIS — F41.9 ANXIETY AND DEPRESSION: ICD-10-CM

## 2024-05-02 DIAGNOSIS — F32.A ANXIETY AND DEPRESSION: ICD-10-CM

## 2024-05-02 DIAGNOSIS — E03.9 ACQUIRED HYPOTHYROIDISM: ICD-10-CM

## 2024-05-02 DIAGNOSIS — G31.83 MILD LEWY BODY DEMENTIA WITHOUT BEHAVIORAL DISTURBANCE, PSYCHOTIC DISTURBANCE, MOOD DISTURBANCE, OR ANXIETY: ICD-10-CM

## 2024-05-03 RX ORDER — LEVOTHYROXINE SODIUM 0.1 MG/1
100 TABLET ORAL DAILY
Qty: 90 TABLET | Refills: 3 | Status: SHIPPED | OUTPATIENT
Start: 2024-05-03

## 2024-05-03 RX ORDER — ESCITALOPRAM OXALATE 20 MG/1
TABLET ORAL
Qty: 90 TABLET | Refills: 4 | Status: SHIPPED | OUTPATIENT
Start: 2024-05-03

## 2024-05-03 RX ORDER — OXYBUTYNIN CHLORIDE 15 MG/1
15 TABLET, EXTENDED RELEASE ORAL DAILY
Qty: 90 TABLET | Refills: 3 | Status: SHIPPED | OUTPATIENT
Start: 2024-05-03

## 2024-05-05 PROBLEM — F51.01 PRIMARY INSOMNIA: Status: ACTIVE | Noted: 2024-05-05

## 2024-07-22 DIAGNOSIS — J30.1 CHRONIC SEASONAL ALLERGIC RHINITIS DUE TO POLLEN: ICD-10-CM

## 2024-07-22 DIAGNOSIS — E88.819 INSULIN RESISTANCE: ICD-10-CM

## 2024-07-22 RX ORDER — MONTELUKAST SODIUM 10 MG/1
10 TABLET ORAL DAILY
Qty: 90 TABLET | Refills: 3 | Status: SHIPPED | OUTPATIENT
Start: 2024-07-22

## 2024-08-01 NOTE — PROGRESS NOTES
Subjective   The ABCs of the Annual Wellness Visit  Medicare Wellness Visit      Genevieve Kuo is a 69 y.o. patient who presents for a Medicare Wellness Visit.    The following portions of the patient's history were reviewed and   updated as appropriate: allergies, current medications, past family history, past medical history, past social history, past surgical history, and problem list. Genevieve Kuo is -3, Parity-3. No LMP recorded. Patient has had a hysterectomy. She is postmenopausal.      Compared to one year ago, the patient's physical   health is the same.  Compared to one year ago, the patient's mental   health is worse.    Recent Hospitalizations:  She was not admitted to the hospital during the last year.     Current Medical Providers:  Patient Care Team:  Sade Fishman MD as PCP - Hakan Garcia MD as Consulting Physician (Pain Medicine)    Outpatient Medications Prior to Visit   Medication Sig Dispense Refill    albuterol (PROVENTIL) (2.5 MG/3ML) 0.083% nebulizer solution Inhale As Needed.      albuterol sulfate  (90 Base) MCG/ACT inhaler Inhale 2 puffs Every 4 (Four) Hours As Needed for Wheezing. 18 g 12    atorvastatin (LIPITOR) 10 MG tablet Take 1 tablet by mouth Daily. 90 tablet 3    buPROPion XL (WELLBUTRIN XL) 300 MG 24 hr tablet Take 1 tablet by mouth Daily. 90 tablet 4    cetirizine (zyrTEC) 10 MG tablet       diclofenac (VOLTAREN) 0.1 % ophthalmic solution Administer 1 drop to both eyes 4 (Four) Times a Day. 5 mL 6    donepezil (ARICEPT) 10 MG tablet Take 1 tablet by mouth 2 (Two) Times a Day. 180 tablet 3    escitalopram (LEXAPRO) 20 MG tablet TAKE 1 TABLET BY MOUTH EVERY DAY 90 tablet 4    fluticasone (FLONASE) 50 MCG/ACT nasal spray 2 sprays into the nostril(s) as directed by provider Daily. 16 g 3    gabapentin (NEURONTIN) 300 MG capsule Take 1 capsule by mouth 2 (Two) Times a Day. 200 capsule 2    levothyroxine (SYNTHROID, LEVOTHROID) 100 MCG tablet TAKE  1 TABLET BY MOUTH EVERY DAY 90 tablet 3    meclizine (ANTIVERT) 25 MG tablet TAKE 1 TABLET BY MOUTH 4 (FOUR) TIMES A DAY. 40 tablet 3    metFORMIN (GLUCOPHAGE) 500 MG tablet TAKE 1 TABLET BY MOUTH EVERY DAY WITH BREAKFAST 90 tablet 3    montelukast (SINGULAIR) 10 MG tablet TAKE 1 TABLET BY MOUTH EVERY DAY 90 tablet 3    Omega-3 Fatty Acids (FISH OIL) 1000 MG capsule capsule Take 1 capsule by mouth 3 (Three) Times a Day.      oxybutynin XL (DITROPAN XL) 15 MG 24 hr tablet TAKE 1 TABLET BY MOUTH EVERY DAY 90 tablet 3    QUEtiapine (SEROquel) 25 MG tablet Take 1 tablet by mouth Every Night. 90 tablet 3    Semaglutide,0.25 or 0.5MG/DOS, (Ozempic, 0.25 or 0.5 MG/DOSE,) 2 MG/1.5ML solution pen-injector Inject 0.25 mg under the skin into the appropriate area as directed 1 (One) Time Per Week. 4 mL 12     No facility-administered medications prior to visit.     No opioid medication identified on active medication list. I have reviewed chart for other potential  high risk medication/s and harmful drug interactions in the elderly.      Aspirin is not on active medication list.      Patient Active Problem List   Diagnosis    Mixed hyperlipidemia    Acquired hypothyroidism    Special screening for malignant neoplasms, colon    Tubular adenoma of colon    Screening for malignant neoplasm of cervix    Intervertebral lumbar disc disorder with myelopathy, lumbar region    Fibrocystic changes of right breast    Family history of malignant neoplasm of gastrointestinal tract    Medicare annual wellness visit, subsequent    Diverticulosis of large intestine without hemorrhage    Chronic seasonal allergic rhinitis due to pollen    Asymptomatic menopausal state    Arthralgia of multiple sites    Anxiety and depression    Cervical disc disease    Encounter for screening mammogram for malignant neoplasm of breast    Class 1 obesity due to excess calories with serious comorbidity and body mass index (BMI) of 30.0 to 30.9 in adult    Family  "history of dementia    Prediabetes    Drusen of right macula    CAMILA (obstructive sleep apnea)    Mild Lewy body dementia    Mixed stress and urge urinary incontinence    Primary insomnia     Advance Care Planning (Click this link to access ACP Navigator)  Advance Directive is on file.  ACP discussion was held with the patient during this visit. Patient has an advance directive in EMR which is still valid.         Objective   Vitals:    24 1027   BP: 116/72   BP Location: Left arm   Patient Position: Sitting   Cuff Size: Adult   Pulse: 86   Resp: 18   Temp: 97.3 °F (36.3 °C)   TempSrc: Temporal   SpO2: 99%   Weight: 86.6 kg (191 lb)   Height: 167.6 cm (65.98\")   PainSc:   5   PainLoc: Back       Estimated body mass index is 30.84 kg/m² as calculated from the following:    Height as of this encounter: 167.6 cm (65.98\").    Weight as of this encounter: 86.6 kg (191 lb).             Does the patient have evidence of cognitive impairment? No  Lab Results   Component Value Date    CHLPL 148 2024    TRIG 138 2024    HDL 48 2024    LDL 76 2024    VLDL 24 2024    HGBA1C 6.1 (A) 2024                                                                                                Health  Risk Assessment    Smoking Status:  Social History     Tobacco Use   Smoking Status Never    Passive exposure: Never   Smokeless Tobacco Never     Alcohol Consumption:  Social History     Substance and Sexual Activity   Alcohol Use No     Fall Risk Screen:  STEADI Fall Risk Assessment was completed, and patient is at HIGH risk for falls. Assessment completed on:2024    Depression Screenin/6/2024    10:27 AM   PHQ-2/PHQ-9 Depression Screening   Little Interest or Pleasure in Doing Things 0-->not at all   Feeling Down, Depressed or Hopeless 0-->not at all   PHQ-9: Brief Depression Severity Measure Score 0   I spent 5 min obtaining and discussing depression screen.   Health Habits and " Functional and Cognitive Screenin/6/2024    10:00 AM   Functional & Cognitive Status   Do you have difficulty preparing food and eating? No   Do you have difficulty bathing yourself, getting dressed or grooming yourself? No   Do you have difficulty using the toilet? No   Do you have difficulty moving around from place to place? No   Do you have trouble with steps or getting out of a bed or a chair? No   Current Diet Well Balanced Diet   Dental Exam Up to date   Eye Exam Up to date   Exercise (times per week) 0 times per week   Current Exercises Include No Regular Exercise   Do you need help using the phone?  No   Are you deaf or do you have serious difficulty hearing?  No   Do you need help to go to places out of walking distance? No   Do you need help shopping? No   Do you need help preparing meals?  No   Do you need help with housework?  No   Do you need help with laundry? No   Do you need help taking your medications? No   Do you need help managing money? No   Do you ever drive or ride in a car without wearing a seat belt? No   Have you felt unusual stress, anger or loneliness in the last month? No   Who do you live with? Spouse   If you need help, do you have trouble finding someone available to you? No   Have you been bothered in the last four weeks by sexual problems? No   Do you have difficulty concentrating, remembering or making decisions? Yes             Age-appropriate Screening Schedule:  Refer to the list below for future screening recommendations based on patient's age, sex and/or medical conditions. Orders for these recommended tests are listed in the plan section. The patient has been provided with a written plan.    Health Maintenance List  Health Maintenance   Topic Date Due    DXA SCAN  2020    COVID-19 Vaccine (2023- season) 2024    MAMMOGRAM  2024    INFLUENZA VACCINE  2024    ZOSTER VACCINE (1 of 2) 2024 (Originally 2003)    ANNUAL WELLNESS  VISIT  08/06/2025    LIPID PANEL  08/06/2025    BMI FOLLOWUP  08/06/2025    TDAP/TD VACCINES (2 - Td or Tdap) 02/11/2026    COLORECTAL CANCER SCREENING  03/12/2028    HEPATITIS C SCREENING  Completed    Pneumococcal Vaccine 65+  Completed                                                                                                                                                CMS Preventative Services Quick Reference  Risk Factors Identified During Encounter  Progressive dementia, risk factors modified.     The above risks/problems have been discussed with the patient.  Pertinent information has been shared with the patient in the After Visit Summary.  An After Visit Summary and PPPS were made available to the patient.    Follow Up:   Next Medicare Wellness visit to be scheduled in 1 year.         Additional E&M Note during same encounter follows:  Patient has additional, significant, and separately identifiable condition(s)/problem(s) that require work above and beyond the Medicare Wellness Visit     Chief Complaint  Medicare Wellness-subsequent, Prediabetes, Hypertension, Hyperlipidemia, Hypothyroidism, Anxiety, and Urinary Tract Infection    Subjective   Hyperlipidemia  This is a chronic problem. The current episode started more than 1 year ago. The problem is controlled. Recent lipid tests were reviewed and are variable. Exacerbating diseases include diabetes, hypothyroidism and obesity. She has no history of chronic renal disease, liver disease or nephrotic syndrome. Factors aggravating her hyperlipidemia include fatty foods. Pertinent negatives include no chest pain, focal sensory loss, focal weakness, leg pain, myalgias or shortness of breath. Current antihyperlipidemic treatment includes statins and herbal therapy. The current treatment provides moderate improvement of lipids. There are no compliance problems.  Risk factors for coronary artery disease include post-menopausal, dyslipidemia, diabetes  mellitus, obesity and family history.   Hypothyroidism  This is a chronic problem. The current episode started more than 1 year ago. The problem has been unchanged. Associated symptoms include congestion, urinary symptoms and vertigo. Pertinent negatives include no abdominal pain, arthralgias, chest pain, chills, fatigue, fever, headaches, joint swelling, myalgias, nausea, numbness, rash, vomiting or weakness. Treatments tried: levothyroxine 100 mcg. The treatment provided moderate relief.   Blood Sugar Problem  This is a chronic problem. The current episode started more than 1 year ago. The problem has been waxing and waning. Associated symptoms include congestion, urinary symptoms and vertigo. Pertinent negatives include no abdominal pain, arthralgias, chest pain, chills, fatigue, fever, headaches, joint swelling, myalgias, nausea, numbness, rash, vomiting or weakness. The symptoms are aggravated by eating. Treatments tried: Ozempic Compound. The treatment provided moderate relief.   Insomnia  This is a chronic problem. The current episode started more than 1 month ago. The problem occurs daily. The problem has been waxing and waning. Associated symptoms include congestion, urinary symptoms and vertigo. Pertinent negatives include no abdominal pain, arthralgias, chest pain, chills, fatigue, fever, headaches, joint swelling, myalgias, nausea, numbness, rash, vomiting or weakness. Nothing aggravates the symptoms. Treatments tried: seroquel.   Anxiety  Presents for follow-up visit.  Symptoms include confusion, depressed mood, excessive worry, insomnia, irritability, nervous/anxious behavior and restlessness.  Patient reports no chest pain, dizziness, hyperventilation, nausea, palpitations, panic, shortness of breath or suicidal ideas. Symptoms occur most days. The severity of symptoms is interfering with daily activities. The patient sleeps 4 hours per night. The quality of sleep is poor. Awakens often during the  night. Past treatments include SSRIs. The treatment provided no relief.   Urinary Tract Infection   This is a new problem. The current episode started 1 to 4 weeks ago. The quality of the pain is described as aching and burning. The pain is mild. There has been no fever. She is not sexually active. There is no history of pyelonephritis. Associated symptoms include frequency and hesitancy. Pertinent negatives include no chills, discharge, hematuria, nausea, possible pregnancy, sweats, urgency or vomiting. She has tried nothing for the symptoms. The treatment provided mild relief.   Dementia  This is a chronic problem. The current episode started more than 1 year ago. The problem has been gradually worsening. Associated symptoms include congestion, urinary symptoms and vertigo. Pertinent negatives include no abdominal pain, arthralgias, chest pain, chills, fatigue, fever, headaches, joint swelling, myalgias, nausea, numbness, rash, vomiting or weakness. The treatment provided mild relief.     Genevieve is also being seen today for additional medical problem/s.    Review of Systems   Constitutional:  Positive for irritability. Negative for activity change, appetite change, chills, fatigue and fever.   HENT:  Positive for congestion. Negative for hearing loss, rhinorrhea, trouble swallowing and voice change.    Eyes:  Negative for visual disturbance.   Respiratory:  Negative for shortness of breath and wheezing.    Cardiovascular:  Negative for chest pain, palpitations and leg swelling.   Gastrointestinal:  Negative for abdominal pain, constipation, diarrhea, nausea and vomiting.   Endocrine: Negative for cold intolerance, heat intolerance, polydipsia and polyuria.   Genitourinary:  Positive for frequency and hesitancy. Negative for dysuria, hematuria and urgency.   Musculoskeletal:  Negative for arthralgias, joint swelling and myalgias.   Skin:  Negative for rash.   Neurological:  Positive for vertigo and confusion.  "Negative for dizziness, focal weakness, syncope, weakness and numbness.        Memory loss   Hematological:  Negative for adenopathy. Does not bruise/bleed easily.   Psychiatric/Behavioral:  Positive for depressed mood. Negative for suicidal ideas. The patient is nervous/anxious and has insomnia.               Objective   Vital Signs:  /72 (BP Location: Left arm, Patient Position: Sitting, Cuff Size: Adult)   Pulse 86   Temp 97.3 °F (36.3 °C) (Temporal)   Resp 18   Ht 167.6 cm (65.98\")   Wt 86.6 kg (191 lb)   SpO2 99%   BMI 30.84 kg/m²   Physical Exam  Constitutional:       General: She is not in acute distress.     Appearance: She is well-developed.   HENT:      Head: Normocephalic. Hair is normal.      Right Ear: Tympanic membrane and external ear normal.      Left Ear: Tympanic membrane and external ear normal.      Nose: Nose normal. No mucosal edema.      Mouth/Throat:      Pharynx: Uvula midline.   Eyes:      General:         Right eye: No discharge.         Left eye: No discharge.      Conjunctiva/sclera: Conjunctivae normal.      Pupils: Pupils are equal, round, and reactive to light.   Neck:      Thyroid: No thyromegaly.      Vascular: No JVD.   Cardiovascular:      Rate and Rhythm: Normal rate and regular rhythm.      Chest Wall: PMI is not displaced.      Pulses:           Carotid pulses are 2+ on the right side and 2+ on the left side.       Femoral pulses are 2+ on the right side and 2+ on the left side.       Dorsalis pedis pulses are 2+ on the right side and 2+ on the left side.      Heart sounds: Normal heart sounds. No murmur heard.     No friction rub. No gallop.      Comments: Negative Homans' no edema  Pulmonary:      Effort: Pulmonary effort is normal. No respiratory distress.      Breath sounds: Normal breath sounds. No decreased breath sounds, wheezing, rhonchi or rales.   Chest:   Breasts:     Breasts are symmetrical.      Right: No inverted nipple, mass, nipple discharge, skin " change or tenderness.      Left: No inverted nipple, mass, nipple discharge, skin change or tenderness.   Abdominal:      General: Bowel sounds are normal. There is no distension or abdominal bruit.      Palpations: Abdomen is soft. There is no mass.      Tenderness: There is no abdominal tenderness.   Musculoskeletal:         General: No tenderness or deformity. Normal range of motion.      Cervical back: Normal range of motion and neck supple. No muscular tenderness.   Lymphadenopathy:      Cervical: No cervical adenopathy.      Upper Body:      Right upper body: No supraclavicular adenopathy.      Left upper body: No supraclavicular adenopathy.   Skin:     General: Skin is warm and dry.      Findings: No ecchymosis, erythema, lesion or rash.   Neurological:      Mental Status: She is alert and oriented to person, place, and time.      Cranial Nerves: No cranial nerve deficit.      Sensory: No sensory deficit.      Motor: No abnormal muscle tone.      Coordination: Coordination abnormal.      Gait: Gait abnormal (amubulates with a cane).      Deep Tendon Reflexes: Reflexes are normal and symmetric.   Psychiatric:         Mood and Affect: Mood normal.         Speech: Speech normal.         Behavior: Behavior normal.         Thought Content: Thought content normal. Thought content does not include suicidal ideation.         Cognition and Memory: She does not exhibit impaired recent memory or impaired remote memory.         Judgment: Judgment is not impulsive.         The following data was reviewed by: Sade Fishman MD on 08/06/2024:        Medicare annual wellness visit, subsequent    Mild Lewy body dementia with anxiety    Mixed hyperlipidemia   Lipid abnormalities are stable    Plan:  Continue same medication/s without change.      Discussed medication dosage, use, side effects, and goals of treatment in detail.    Counseled patient on lifestyle modifications to help control hyperlipidemia.     Patient  Treatment Goals:   LDL goal is under 100    Followup in 3 months.  Acquired hypothyroidism    Prediabetes    Hyperglycemia    Primary insomnia    Anxiety and depression  Patient's depression is a recurrent episode that is mild without psychosis. Depression is in full remission and stable.    Plan:   Continue current medication therapy     Followup in 3 months.   Asymptomatic menopausal state    Encounter for screening mammogram for malignant neoplasm of breast    Special screening for malignant neoplasms, colon    Screening for malignant neoplasm of cervix    Class 1 obesity due to excess calories with serious comorbidity and body mass index (BMI) of 30.0 to 30.9 in adult  Patient's (Body mass index is 30.84 kg/m².) indicates that they are obese (BMI >30) with health conditions that include hypertension . Weight is unchanged. BMI  is above average; BMI management plan is completed. We discussed portion control and increasing exercise.   Urinary frequency    CAMILA (obstructive sleep apnea)    Tubular adenoma of colon    Screening mammogram for breast cancer    Diverticulosis of large intestine without hemorrhage    Chronic seasonal allergic rhinitis due to pollen    Intervertebral lumbar disc disorder with myelopathy, lumbar region    Cystitis    Mixed stress and urge urinary incontinence      Orders Placed This Encounter   Procedures    Urine Culture - Urine, Urine, Random Void     Order Specific Question:   Release to patient     Answer:   Routine Release [[id number]]    Mammo Screening Digital Tomosynthesis Bilateral With CAD     Standing Status:   Future     Standing Expiration Date:   8/6/2025     Order Specific Question:   Reason for Exam:     Answer:   Breast cancer screening     Order Specific Question:   Release to patient     Answer:   Routine Release [[id number]]    DEXA Bone Density Axial     Standing Status:   Future     Standing Expiration Date:   8/6/2025     Order Specific Question:   Reason for Exam:      Answer:   post menopausal     Order Specific Question:   Release to patient     Answer:   Routine Release [[id number]]     Order Specific Question:   Is patient taking or have taken long term Glucocorticoid (steroids)?     Answer:   No     Order Specific Question:   Does the patient have rheumatoid arthritis?     Answer:   No     Order Specific Question:   Does the patient have secondary osteoporosis?     Answer:   No    Comprehensive Metabolic Panel     Order Specific Question:   Release to patient     Answer:   Routine Release [[id number]]    Lipid Panel With / Chol / HDL Ratio     Order Specific Question:   Release to patient     Answer:   Routine Release [[id number]]    TSH     Order Specific Question:   Release to patient     Answer:   Routine Release [[id number]]    POCT urinalysis dipstick, manual     Order Specific Question:   Release to patient     Answer:   Routine Release [[id number]]    POC Glycosylated Hemoglobin (Hb A1C)     Order Specific Question:   Release to patient     Answer:   Routine Release [[id number]]    CBC & Differential     Order Specific Question:   Manual Differential     Answer:   No     Order Specific Question:   Release to patient     Answer:   Routine Release [[id number]]     New Medications Ordered This Visit   Medications    sulfamethoxazole-trimethoprim (BACTRIM DS,SEPTRA DS) 800-160 MG per tablet     Sig: Take 1 tablet by mouth 2 (Two) Times a Day.     Dispense:  20 tablet     Refill:  0     Assessment and Plan CC Problem List  Visit Diagnosis  ROS  Review (Popup)  Health Maintenance  Quality  BestPractice  Medications  SmartSets  SnapShot Encounters  Media  Problem List Items Addressed This Visit          High    Anxiety and depression    Overview     Controlled continue escitalopram and wellbutrin   Pros cons and potential side effects discussed         Current Assessment & Plan              Prediabetes    Overview     A1c 6.1 08/06/2024 good but worse off GLP1  on metfromin,  improve exercise  A1c 5.7 04/23/2024 improved!!! Continue diet and exercise as tolerated.   A1c 6.4 01/18/2024 worse,   A1c 6.0 01/03/2023 begin metformin  A1c 5.9 08/14/2022 continued improvement continue healthy diet and exercise  A1c 6.0  06/14/2022  A1c 6.5 01/2022 Diet and exercise discussed. It's importance is understood.             Mild Lewy body dementia    Overview     On Aricept 10 mg doing well. Dx 2022,   mild hallucinations and unsteady gait.   Loss of short term memory seems stable  Followed with [facility] q 6 mos.           Current Assessment & Plan                 Medium    Acquired hypothyroidism    Overview     No sxs compliant TSH elevated 04/2023 repeat TSH today notify of results. Adjust meds as indicated.         Relevant Orders    CBC & Differential (Completed)    TSH (Completed)    CAMILA (obstructive sleep apnea)    Overview     She struggles with CPAP, she is off            Low    Mixed hyperlipidemia    Overview     decreased HDL, 31,  Compliant with Statin Rx         Current Assessment & Plan      Lipid abnormalities are stable    Plan:  Continue same medication/s without change.      Discussed medication dosage, use, side effects, and goals of treatment in detail.    Counseled patient on lifestyle modifications to help control hyperlipidemia.     Patient Treatment Goals:   LDL goal is under 100    Followup in 3 months.         Relevant Orders    Comprehensive Metabolic Panel (Completed)    Lipid Panel With / Chol / HDL Ratio (Completed)    Tubular adenoma of colon    Overview     colonsocpy 03/13/2018, repeat  2023, she continues to decline, she is strongly encouraged to reconsider. She feels that she will not live long enough for it to matter         Intervertebral lumbar disc disorder with myelopathy, lumbar region    Overview     Presently stable on prn OTC  anti inflammatories which are continued.         Diverticulosis of large intestine without hemorrhage    Overview      No sxs distant hx of diverticulitis  Ascending colon, Colonscopy, 2012.         Chronic seasonal allergic rhinitis due to pollen    Overview     with reactive airways  controlled with maximum Rx, continued/refilled.         Class 1 obesity due to excess calories with serious comorbidity and body mass index (BMI) of 30.0 to 30.9 in adult    Overview     Healthy diet and regular exercise and wt loss encouraged.          Current Assessment & Plan     Patient's (Body mass index is 30.84 kg/m².) indicates that they are obese (BMI >30) with health conditions that include hypertension . Weight is unchanged. BMI  . We discussed portion control and increasing exercise.          Mixed stress and urge urinary incontinence    Overview     Sxs improved on  meds oxyburynin         Primary insomnia    Overview     On Seroquel and doing well. Pros cons and side effects discussed. Good sleep habits encouraged            Unprioritized    Special screening for malignant neoplasms, colon    Overview     Patient declined colonoscopy packet 07/10/2023 she will consider.   Last colonoscopy 03/12/2018. Repeat 2023.         Screening for malignant neoplasm of cervix    Overview     Last pap smear 03/13/2015. WNL.  S/P SNEHAL with BSO for menorrhagia and ovarian cysts         Medicare annual wellness visit, subsequent    Overview     Healthy diet  regular exercise breast self exams, seat belts, sunscreens, fall prevention and general safety discused and encouraged. Previous lab reviewed. We will notify her today's results         Asymptomatic menopausal state    Overview     Last bone dexa scan is 02/28/2018. WNL Repeat 2023         Relevant Orders    DEXA Bone Density Axial    Encounter for screening mammogram for malignant neoplasm of breast    Overview     Last mammogram 06/2023 11/16/2020         Relevant Orders    Mammo Screening Digital Tomosynthesis Bilateral With CAD     Other Visit Diagnoses       Screening mammogram for breast cancer     -  Primary    Hyperglycemia        Relevant Orders    POCT urinalysis dipstick, manual (Completed)    POC Glycosylated Hemoglobin (Hb A1C) (Completed)    Urinary frequency        Relevant Orders    Urine Culture - Urine, Urine, Random Void (Completed)    Cystitis        Relevant Medications    sulfamethoxazole-trimethoprim (BACTRIM DS,SEPTRA DS) 800-160 MG per tablet            Follow Up   Return in about 3 months (around 11/6/2024).  Patient was given instructions and counseling regarding her condition or for health maintenance advice. Please see specific information pulled into the AVS if appropriate.    Advanced Care Planning:  ACP discussion was held with the patient during this visit. Patient has an advance directive in EMR which is still valid.     A face-to-face visit was completed today with patient.  Counseling explanation, and discussion of advanced directives was performed.   The last advanced care visit was performed in 2023.  In a near life ending situation, from which she is not expected to recover functionally, and she is not able to speak for herself, she does not want life sustaining measures. We discussed feeding tubes, ventilators and cardiac support as well as dialysis.    I spent more than 16 minutes discussing Advanced Care Planning information and documenting in the chart.

## 2024-08-06 ENCOUNTER — OFFICE VISIT (OUTPATIENT)
Dept: FAMILY MEDICINE CLINIC | Facility: CLINIC | Age: 69
End: 2024-08-06
Payer: COMMERCIAL

## 2024-08-06 VITALS
RESPIRATION RATE: 18 BRPM | WEIGHT: 191 LBS | BODY MASS INDEX: 30.7 KG/M2 | HEIGHT: 66 IN | DIASTOLIC BLOOD PRESSURE: 72 MMHG | TEMPERATURE: 97.3 F | SYSTOLIC BLOOD PRESSURE: 116 MMHG | OXYGEN SATURATION: 99 % | HEART RATE: 86 BPM

## 2024-08-06 DIAGNOSIS — M51.06 INTERVERTEBRAL LUMBAR DISC DISORDER WITH MYELOPATHY, LUMBAR REGION: ICD-10-CM

## 2024-08-06 DIAGNOSIS — F41.9 ANXIETY AND DEPRESSION: ICD-10-CM

## 2024-08-06 DIAGNOSIS — Z12.31 SCREENING MAMMOGRAM FOR BREAST CANCER: Primary | ICD-10-CM

## 2024-08-06 DIAGNOSIS — D12.6 TUBULAR ADENOMA OF COLON: ICD-10-CM

## 2024-08-06 DIAGNOSIS — N39.46 MIXED STRESS AND URGE URINARY INCONTINENCE: ICD-10-CM

## 2024-08-06 DIAGNOSIS — Z12.31 ENCOUNTER FOR SCREENING MAMMOGRAM FOR MALIGNANT NEOPLASM OF BREAST: ICD-10-CM

## 2024-08-06 DIAGNOSIS — J30.1 CHRONIC SEASONAL ALLERGIC RHINITIS DUE TO POLLEN: ICD-10-CM

## 2024-08-06 DIAGNOSIS — R35.0 URINARY FREQUENCY: ICD-10-CM

## 2024-08-06 DIAGNOSIS — K57.30 DIVERTICULOSIS OF LARGE INTESTINE WITHOUT HEMORRHAGE: ICD-10-CM

## 2024-08-06 DIAGNOSIS — F32.A ANXIETY AND DEPRESSION: ICD-10-CM

## 2024-08-06 DIAGNOSIS — E78.2 MIXED HYPERLIPIDEMIA: ICD-10-CM

## 2024-08-06 DIAGNOSIS — Z12.4 SCREENING FOR MALIGNANT NEOPLASM OF CERVIX: ICD-10-CM

## 2024-08-06 DIAGNOSIS — Z12.11 SPECIAL SCREENING FOR MALIGNANT NEOPLASMS, COLON: ICD-10-CM

## 2024-08-06 DIAGNOSIS — E66.09 CLASS 1 OBESITY DUE TO EXCESS CALORIES WITH SERIOUS COMORBIDITY AND BODY MASS INDEX (BMI) OF 30.0 TO 30.9 IN ADULT: ICD-10-CM

## 2024-08-06 DIAGNOSIS — Z00.00 MEDICARE ANNUAL WELLNESS VISIT, SUBSEQUENT: ICD-10-CM

## 2024-08-06 DIAGNOSIS — F51.01 PRIMARY INSOMNIA: ICD-10-CM

## 2024-08-06 DIAGNOSIS — G31.83 MILD LEWY BODY DEMENTIA WITH ANXIETY: ICD-10-CM

## 2024-08-06 DIAGNOSIS — N30.90 CYSTITIS: ICD-10-CM

## 2024-08-06 DIAGNOSIS — Z78.0 ASYMPTOMATIC MENOPAUSAL STATE: ICD-10-CM

## 2024-08-06 DIAGNOSIS — R73.03 PREDIABETES: ICD-10-CM

## 2024-08-06 DIAGNOSIS — G47.33 OSA (OBSTRUCTIVE SLEEP APNEA): ICD-10-CM

## 2024-08-06 DIAGNOSIS — E03.9 ACQUIRED HYPOTHYROIDISM: ICD-10-CM

## 2024-08-06 DIAGNOSIS — R73.9 HYPERGLYCEMIA: ICD-10-CM

## 2024-08-06 DIAGNOSIS — F02.A4 MILD LEWY BODY DEMENTIA WITH ANXIETY: ICD-10-CM

## 2024-08-06 LAB
BILIRUB BLD-MCNC: NEGATIVE MG/DL
CLARITY, POC: ABNORMAL
COLOR UR: YELLOW
EXPIRATION DATE: ABNORMAL
GLUCOSE UR STRIP-MCNC: NEGATIVE MG/DL
HBA1C MFR BLD: 6.1 % (ref 4.5–5.7)
KETONES UR QL: NEGATIVE
LEUKOCYTE EST, POC: ABNORMAL
Lab: ABNORMAL
NITRITE UR-MCNC: POSITIVE MG/ML
PH UR: 5 [PH] (ref 5–8)
PROT UR STRIP-MCNC: NEGATIVE MG/DL
RBC # UR STRIP: ABNORMAL /UL
SP GR UR: 1.01 (ref 1–1.03)
UROBILINOGEN UR QL: NORMAL

## 2024-08-06 RX ORDER — SULFAMETHOXAZOLE AND TRIMETHOPRIM 800; 160 MG/1; MG/1
1 TABLET ORAL 2 TIMES DAILY
Qty: 20 TABLET | Refills: 0 | Status: SHIPPED | OUTPATIENT
Start: 2024-08-06

## 2024-08-07 LAB
ALBUMIN SERPL-MCNC: 4.4 G/DL (ref 3.8–4.8)
ALP SERPL-CCNC: 126 IU/L (ref 44–121)
ALT SERPL-CCNC: 9 IU/L (ref 0–32)
AST SERPL-CCNC: 11 IU/L (ref 0–40)
BASOPHILS # BLD AUTO: 0 X10E3/UL (ref 0–0.2)
BASOPHILS NFR BLD AUTO: 1 %
BILIRUB SERPL-MCNC: 0.2 MG/DL (ref 0–1.2)
BUN SERPL-MCNC: 15 MG/DL (ref 8–27)
BUN/CREAT SERPL: 16 (ref 12–28)
CALCIUM SERPL-MCNC: 9.9 MG/DL (ref 8.7–10.3)
CHLORIDE SERPL-SCNC: 104 MMOL/L (ref 96–106)
CHOLEST SERPL-MCNC: 148 MG/DL (ref 100–199)
CHOLEST/HDLC SERPL: 3.1 RATIO (ref 0–4.4)
CO2 SERPL-SCNC: 25 MMOL/L (ref 20–29)
CREAT SERPL-MCNC: 0.92 MG/DL (ref 0.57–1)
EGFRCR SERPLBLD CKD-EPI 2021: 67 ML/MIN/1.73
EOSINOPHIL # BLD AUTO: 0.3 X10E3/UL (ref 0–0.4)
EOSINOPHIL NFR BLD AUTO: 4 %
ERYTHROCYTE [DISTWIDTH] IN BLOOD BY AUTOMATED COUNT: 12.5 % (ref 11.7–15.4)
GLOBULIN SER CALC-MCNC: 2.1 G/DL (ref 1.5–4.5)
GLUCOSE SERPL-MCNC: 95 MG/DL (ref 70–99)
HCT VFR BLD AUTO: 42.3 % (ref 34–46.6)
HDLC SERPL-MCNC: 48 MG/DL
HGB BLD-MCNC: 13.7 G/DL (ref 11.1–15.9)
IMM GRANULOCYTES # BLD AUTO: 0 X10E3/UL (ref 0–0.1)
IMM GRANULOCYTES NFR BLD AUTO: 0 %
LDLC SERPL CALC-MCNC: 76 MG/DL (ref 0–99)
LYMPHOCYTES # BLD AUTO: 2.1 X10E3/UL (ref 0.7–3.1)
LYMPHOCYTES NFR BLD AUTO: 29 %
MCH RBC QN AUTO: 30 PG (ref 26.6–33)
MCHC RBC AUTO-ENTMCNC: 32.4 G/DL (ref 31.5–35.7)
MCV RBC AUTO: 93 FL (ref 79–97)
MONOCYTES # BLD AUTO: 0.6 X10E3/UL (ref 0.1–0.9)
MONOCYTES NFR BLD AUTO: 8 %
NEUTROPHILS # BLD AUTO: 4.2 X10E3/UL (ref 1.4–7)
NEUTROPHILS NFR BLD AUTO: 58 %
PLATELET # BLD AUTO: 336 X10E3/UL (ref 150–450)
POTASSIUM SERPL-SCNC: 4.1 MMOL/L (ref 3.5–5.2)
PROT SERPL-MCNC: 6.5 G/DL (ref 6–8.5)
RBC # BLD AUTO: 4.56 X10E6/UL (ref 3.77–5.28)
SODIUM SERPL-SCNC: 143 MMOL/L (ref 134–144)
TRIGL SERPL-MCNC: 138 MG/DL (ref 0–149)
TSH SERPL DL<=0.005 MIU/L-ACNC: 0.12 UIU/ML (ref 0.45–4.5)
VLDLC SERPL CALC-MCNC: 24 MG/DL (ref 5–40)
WBC # BLD AUTO: 7.1 X10E3/UL (ref 3.4–10.8)

## 2024-08-07 NOTE — PROGRESS NOTES
[facility] message was sent   Good afternoon we have your lab back and per Dr. Fishman   You have normal white blood cell count, normal platelet count ( this is the blood clotting factor) and no signs of anemia.  You have normal  kidney and  electrolyte function is normal as well. Your TSH  has come back and we need to decrease your dose to 88 mcg . We will get this sent to the pharmacy . We will check this again when you are in here for you DM follow up. Your glucose was 95. You have one of the 4 liver enzymes minimally elevated with your alkaline phosphatase at 126 and this is likely due to arthritic issues. Your total cholesterol was 148 this has decreased from the last time it was checked as it was 163, your triglycerides was at 138 this has decreased from the last time it was checked as it was 144, your HDL (healthy cholesterol) was 48 this has remained the same from the last time it was checked as it was 48( we want this to be as high as we can get it and we do this by exercise, exercise), your LDL ( lousy cholesterol) was 76 this has decreased from the last time it was checked as it was 90. Your total cholesterol/cardiac ratio was 3.1 this has decreased from the last time as it was 3.4.  Continue to work on diet along with exercise and taking your medications as prescribed, and we will check labs again in a year.

## 2024-08-10 LAB
BACTERIA UR CULT: ABNORMAL
BACTERIA UR CULT: ABNORMAL
OTHER ANTIBIOTIC SUSC ISLT: ABNORMAL

## 2024-08-12 NOTE — PROGRESS NOTES
Myc[employer]PN[employer] message was sent   Good afternoon we have your Urine culture back and per Dr. Fishman   Your urine culture has come back and there was a germ growing and it was called Ecoli. The medication that was given while you was here will treat it and you can take it in full till complete.

## 2024-08-17 NOTE — ASSESSMENT & PLAN NOTE
Patient's depression is a recurrent episode that is mild without psychosis. Depression is in full remission and stable.    Plan:   Continue current medication therapy     Followup in 3 months.

## 2024-08-17 NOTE — ASSESSMENT & PLAN NOTE
Patient's (Body mass index is 30.84 kg/m².) indicates that they are obese (BMI >30) with health conditions that include hypertension . Weight is unchanged. BMI  is above average; BMI management plan is completed. We discussed portion control and increasing exercise.

## 2024-08-26 DIAGNOSIS — E78.2 MIXED HYPERLIPIDEMIA: ICD-10-CM

## 2024-08-26 DIAGNOSIS — F41.9 ANXIETY AND DEPRESSION: ICD-10-CM

## 2024-08-26 DIAGNOSIS — F32.A ANXIETY AND DEPRESSION: ICD-10-CM

## 2024-08-26 RX ORDER — ATORVASTATIN CALCIUM 10 MG/1
10 TABLET, FILM COATED ORAL DAILY
Qty: 90 TABLET | Refills: 3 | Status: SHIPPED | OUTPATIENT
Start: 2024-08-26

## 2024-08-26 RX ORDER — BUPROPION HYDROCHLORIDE 300 MG/1
300 TABLET ORAL DAILY
Qty: 90 TABLET | Refills: 4 | Status: SHIPPED | OUTPATIENT
Start: 2024-08-26

## 2024-09-18 DIAGNOSIS — G31.84 MILD COGNITIVE IMPAIRMENT WITH MEMORY LOSS: ICD-10-CM

## 2024-09-18 RX ORDER — DONEPEZIL HYDROCHLORIDE 10 MG/1
10 TABLET, FILM COATED ORAL
Qty: 90 TABLET | Refills: 3 | Status: SHIPPED | OUTPATIENT
Start: 2024-09-18

## 2024-09-20 RX ORDER — GABAPENTIN 300 MG/1
300 CAPSULE ORAL 2 TIMES DAILY
Qty: 180 CAPSULE | Refills: 3 | Status: SHIPPED | OUTPATIENT
Start: 2024-09-20

## 2024-11-08 ENCOUNTER — OFFICE VISIT (OUTPATIENT)
Dept: FAMILY MEDICINE CLINIC | Facility: CLINIC | Age: 69
End: 2024-11-08
Payer: COMMERCIAL

## 2024-11-08 VITALS
RESPIRATION RATE: 18 BRPM | DIASTOLIC BLOOD PRESSURE: 72 MMHG | OXYGEN SATURATION: 95 % | TEMPERATURE: 98.4 F | WEIGHT: 193.5 LBS | SYSTOLIC BLOOD PRESSURE: 126 MMHG | BODY MASS INDEX: 31.1 KG/M2 | HEART RATE: 101 BPM | HEIGHT: 66 IN

## 2024-11-08 DIAGNOSIS — F32.A ANXIETY AND DEPRESSION: ICD-10-CM

## 2024-11-08 DIAGNOSIS — G31.83 MILD LEWY BODY DEMENTIA WITHOUT BEHAVIORAL DISTURBANCE, PSYCHOTIC DISTURBANCE, MOOD DISTURBANCE, OR ANXIETY: ICD-10-CM

## 2024-11-08 DIAGNOSIS — F02.A0 MILD LEWY BODY DEMENTIA WITHOUT BEHAVIORAL DISTURBANCE, PSYCHOTIC DISTURBANCE, MOOD DISTURBANCE, OR ANXIETY: ICD-10-CM

## 2024-11-08 DIAGNOSIS — E03.9 ACQUIRED HYPOTHYROIDISM: ICD-10-CM

## 2024-11-08 DIAGNOSIS — F41.9 ANXIETY AND DEPRESSION: ICD-10-CM

## 2024-11-08 DIAGNOSIS — E78.2 MIXED HYPERLIPIDEMIA: ICD-10-CM

## 2024-11-08 DIAGNOSIS — E66.811 CLASS 1 OBESITY DUE TO EXCESS CALORIES WITH SERIOUS COMORBIDITY AND BODY MASS INDEX (BMI) OF 31.0 TO 31.9 IN ADULT: ICD-10-CM

## 2024-11-08 DIAGNOSIS — Z23 NEED FOR INFLUENZA VACCINATION: ICD-10-CM

## 2024-11-08 DIAGNOSIS — E66.09 CLASS 1 OBESITY DUE TO EXCESS CALORIES WITH SERIOUS COMORBIDITY AND BODY MASS INDEX (BMI) OF 31.0 TO 31.9 IN ADULT: ICD-10-CM

## 2024-11-08 DIAGNOSIS — R73.03 PREDIABETES: ICD-10-CM

## 2024-11-08 DIAGNOSIS — R73.9 HYPERGLYCEMIA: Primary | ICD-10-CM

## 2024-11-08 LAB
EXPIRATION DATE: ABNORMAL
HBA1C MFR BLD: 6.2 % (ref 4.5–5.7)
Lab: ABNORMAL

## 2024-11-08 RX ORDER — BUSPIRONE HYDROCHLORIDE 7.5 MG/1
7.5 TABLET ORAL 2 TIMES DAILY
Qty: 90 TABLET | Refills: 1 | Status: SHIPPED | OUTPATIENT
Start: 2024-11-08

## 2024-11-08 RX ORDER — LEVOTHYROXINE SODIUM 88 UG/1
88 TABLET ORAL DAILY
Qty: 90 TABLET | Refills: 3 | Status: SHIPPED | OUTPATIENT
Start: 2024-11-08

## 2024-11-08 NOTE — PATIENT INSTRUCTIONS
Start taking Buspar 7.5 mg twice daily.  Follow up in 3 months.  Prior to follow up, get repeat cholesterol blood test.

## 2024-11-08 NOTE — ASSESSMENT & PLAN NOTE
Following discussion of options, R/B of each, patient elects trial of Buspar to help get better control of anxiety.  We will follow up closely in a couple months.

## 2024-11-08 NOTE — ASSESSMENT & PLAN NOTE
Patient's (Body mass index is 31.23 kg/m².) indicates that they are obese (BMI >30) with health conditions that include obstructive sleep apnea and dyslipidemias . Weight is worsening. BMI  is above average; BMI management plan is completed. We discussed portion control and increasing exercise.

## 2024-11-08 NOTE — PROGRESS NOTES
"Chief Complaint  Establish Care and Prediabetes    Subjective          Genevieve Kuo presents to [facility] for     \[facility]\""  Establish care, former patient of Dr. Fishman.    Prediabetes  Patient reports is following low concentrated sweets diet.   Patient reports are not checking blood sugar at home.   Patient reports are exercising as much as tolerated.   Last A1c was 6.1 on 08/06/2024.  Currently taking Metformin for prediabetes.      Depression/anxiety  Mood is very well controlled on current regimen.  Feels she is still overly anxious, daily basis, multiple settings.    Hypercholesteremia  Excellent control on atorvastatin  Inquires about discontinuing atorvastatin d/t Lewy Body prognosis    Objective   Vital Signs:   /72 (BP Location: Right arm, Patient Position: Sitting, Cuff Size: Adult)   Pulse 101   Temp 98.4 °F (36.9 °C) (Temporal)   Resp 18   Ht 167.6 cm (66\")   Wt 87.8 kg (193 lb 8 oz)   SpO2 95% Comment: room air  BMI 31.23 kg/m²     Physical Exam  Vitals and nursing note reviewed.   Constitutional:       General: She is not in acute distress.     Appearance: Normal appearance. She is not ill-appearing or diaphoretic.   HENT:      Head: Normocephalic and atraumatic.      Nose: No congestion or rhinorrhea.   Eyes:      Extraocular Movements: Extraocular movements intact.      Pupils: Pupils are equal, round, and reactive to light.   Cardiovascular:      Rate and Rhythm: Normal rate and regular rhythm.      Pulses: Normal pulses.   Pulmonary:      Effort: Pulmonary effort is normal.      Breath sounds: Normal breath sounds.   Musculoskeletal:         General: Normal range of motion.      Cervical back: Normal range of motion and neck supple.   Skin:     General: Skin is warm and dry.      Capillary Refill: Capillary refill takes less than 2 seconds.   Neurological:      Mental Status: She is alert and oriented to person, place, and time.   Psychiatric:         " Mood and Affect: Mood normal.         Behavior: Behavior normal.        Result Review :                 Assessment and Plan    Diagnoses and all orders for this visit:    1. Hyperglycemia (Primary)  -     POC Glycosylated Hemoglobin (Hb A1C)    2. Prediabetes  Overview:  Regimen: Metformin 500 mg qd  Took Ozempic previously - stopped taking it, does not desire additional medication.    A1c 6.2 11/08/2024 worsened  A1c 6.1 08/06/2024 worsened  A1c 5.7 04/23/2024 improved!!! Continue diet and exercise as tolerated.   A1c 6.4 01/18/2024 worse,   A1c 6.0 01/03/2023 begin metformin  A1c 5.9 08/14/2022 continued improvement continue healthy diet and exercise  A1c 6.0  06/14/2022  A1c 6.5 01/2022 Diet and exercise discussed. It's importance is understood.        Orders:  -     POC Glycosylated Hemoglobin (Hb A1C)    3. Mixed hyperlipidemia  Overview:  Regimen: Atorvastatin 10 mg qd.    Lab Results   Component Value Date    CHOL 141 04/19/2019    CHLPL 148 08/06/2024    TRIG 138 08/06/2024    HDL 48 08/06/2024    LDL 76 08/06/2024     Well controlled. Compliant.    Assessment & Plan:  Patient inquires about possibility of discontinuing atorvastatin.  She states that she has Lewy Body dementia, has come to terms with shortened lifespan, and would like to take the least amount of medication possible.  Certainly, cholesterol is exceptionally well controlled on the atorvastatin. It would undoubtedly go up somewhat off of it.  Following discussion of R/B of continued/discontinued atorvastatin, patient elects to discontinue it at this time.  We will monitor cholesterol closely - repeat lipid panel in 3 months.    Orders:  -     Lipid panel; Future    4. Class 1 obesity due to excess calories with serious comorbidity and body mass index (BMI) of 31.0 to 31.9 in adult  Overview:  Extensively counseled on diet, exercise, and weight loss.  Does not desire GLP-1; previously on Ozempic.    Assessment & Plan:  Patient's (Body mass index  is 31.23 kg/m².) indicates that they are obese (BMI >30) with health conditions that include obstructive sleep apnea and dyslipidemias . Weight is worsening. BMI  is above average; BMI management plan is completed. We discussed portion control and increasing exercise.       5. 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 Dx 2022,   mild hallucinations and unsteady gait.   Loss of short term memory seems stable  Followed with Dr. Seipel, neurologist, q6 mos.      6. Acquired hypothyroidism  Overview:  Lab Results   Component Value Date    TSH 0.119 (L) 08/06/2024     Levothyroxine was decreased from 100 -> 88 mcg qd in 8/2024.    Assessment & Plan:  Repeat a TSH today.    Orders:  -     levothyroxine (SYNTHROID, LEVOTHROID) 88 MCG tablet; Take 1 tablet by mouth Daily.  Dispense: 90 tablet; Refill: 3  -     TSH Rfx On Abnormal To Free T4    7. Anxiety and depression  Overview:  Regimen: Lexapro 20 mg qd, Wellbutrin  mg qd, Seroquel 25 mg qhs  Excellent control of mood on current regimen.  However, patient states anxiety can get quite high, on near daily basis.    Assessment & Plan:  Following discussion of options, R/B of each, patient elects trial of Buspar to help get better control of anxiety.  We will follow up closely in a couple months.    Orders:  -     busPIRone (BUSPAR) 7.5 MG tablet; Take 1 tablet by mouth 2 (Two) Times a Day.  Dispense: 90 tablet; Refill: 1    8. Need for influenza vaccination  -     Fluzone High-Dose 65+yrs         Follow Up   Return in about 3 months (around 2/8/2025).  Patient Instructions   Start taking Buspar 7.5 mg twice daily.  Follow up in 3 months.  Prior to follow up, get repeat cholesterol blood test.      Meet Rodriguez MD    NOTE: [facility], per Health Information accessibility laws, allows progress notes to be visible to patients. Please note that these notes will  include professional medical terminology that may be somewhat confusing without some interpretation from your medical professional team. The intent of progress notes is to communicate information between any medical professionals involved in your case, or to serve as future reference for myself or any other provider when reviewing your case, as well as a reference for the patient viewing the record. Please ask a member of the medical team if you have any questions about terminology or content of note.    DISCLAIMER: This note was transcribed using a Dragon Medical voice recognition system. It may contain mistakes that may have not been recognized during proofreading. This note was also amended on the date signed due to recognized errors.

## 2024-11-08 NOTE — ASSESSMENT & PLAN NOTE
Patient inquires about possibility of discontinuing atorvastatin.  She states that she has Lewy Body dementia, has come to terms with shortened lifespan, and would like to take the least amount of medication possible.  Certainly, cholesterol is exceptionally well controlled on the atorvastatin. It would undoubtedly go up somewhat off of it.  Following discussion of R/B of continued/discontinued atorvastatin, patient elects to discontinue it at this time.  We will monitor cholesterol closely - repeat lipid panel in 3 months.

## 2024-11-09 LAB — TSH SERPL DL<=0.005 MIU/L-ACNC: 0.54 UIU/ML (ref 0.45–4.5)

## 2024-11-13 ENCOUNTER — TELEPHONE (OUTPATIENT)
Dept: FAMILY MEDICINE CLINIC | Facility: CLINIC | Age: 69
End: 2024-11-13
Payer: COMMERCIAL

## 2024-11-13 NOTE — TELEPHONE ENCOUNTER
Caller: Darius Kuo    Relationship: Emergency Contact    Best call back number: [phone]     Which medication are you concerned about: SLEEPING PI        What are your concerns: PATIENT TOLD  THAT SHE COULD TAKE TWO OF HER SLEEPING PILLS. SHE TOOK TWO PILLS AND SLEPT FOR 24 HRS. PLEASE CALL  TO DISCUSS ISSUE.

## 2024-11-13 NOTE — PROGRESS NOTES
"Chief Complaint  Follow-up (MEMORY)    Subjective          Genevieve Kuo presents to [facility] for MEMORY   History of Present Illness  CAMILA- patient states she is not using her CPAP.        MEMORY- patient is here to f/u on memory she states memory is worse since last visit, she currently takes aricept 10 mg qd  Pt falls frequently at home, uses cane outside of house and has not fallen.   Tremors come and go, usually when sitting still  Several active dreams.  Has not fallen out of bed   Difficulty sleeping, does not use CPAP   Sleeps a lot during the day   Has had hallucinations/ little things not bad      Maximum   Score Patient's   Score Questions   5 4 \"What is the year?Season?Date?Day of the week?Month?\"   5 4 \"Where are we now: State?County?Town/city?Hospital?Floor?\"   3 3 3 Unrelated objects Number of trials:___   5 5 Count backward from 100 by sevens or spell WORLD backwards   3 2 Name 3 things from above   2 2 Identify 2 objects   1 1 Repeat the phrase: No ifs, ands,or buts.   3 3 Take paper in right hand, fold it in half, and put it on the floor.   1 1 Please read this and do what it says. \"Close your eyes\"   1 1 Make up and write a sentence about anything. Noun and verb   1 0 Copy this picture 10 angles must be present.   30 27 Total MMSE           =====PREV .OV 11/14/23=====    She can't tolerate the mask  Patient is still taking aricept 10 mg   Short term memory is still a problem  Pt has had visual hallucinations in the past,  Notes on occasion to have tremors, shaky in morning.   Some days better than others.   has acted out dreams  Tried melatonin did not help  Does not want to take clonazepam as not bad enough      Sleep testing history:    On NPSG at [facility] , 6/6/22 patient had Mild obstructive sleep apnea syndrome with apnea-hypopnea index of 12.2 per sleep hour, minimum SpO2 of 80%      Current Outpatient Medications:     albuterol (PROVENTIL) (2.5 MG/3ML) 0.083% " nebulizer solution, Inhale As Needed., Disp: , Rfl:     albuterol sulfate  (90 Base) MCG/ACT inhaler, Inhale 2 puffs Every 4 (Four) Hours As Needed for Wheezing., Disp: 18 g, Rfl: 12    buPROPion XL (WELLBUTRIN XL) 300 MG 24 hr tablet, TAKE 1 TABLET BY MOUTH EVERY DAY, Disp: 90 tablet, Rfl: 4    busPIRone (BUSPAR) 7.5 MG tablet, Take 1 tablet by mouth 2 (Two) Times a Day., Disp: 90 tablet, Rfl: 1    cetirizine (zyrTEC) 10 MG tablet, , Disp: , Rfl:     diclofenac (VOLTAREN) 0.1 % ophthalmic solution, Administer 1 drop to both eyes 4 (Four) Times a Day., Disp: 5 mL, Rfl: 6    donepezil (ARICEPT) 10 MG tablet, Take 1 tablet by mouth 2 (Two) Times a Day. One, Disp: 180 tablet, Rfl: 3    escitalopram (LEXAPRO) 20 MG tablet, TAKE 1 TABLET BY MOUTH EVERY DAY, Disp: 90 tablet, Rfl: 4    fluticasone (FLONASE) 50 MCG/ACT nasal spray, 2 sprays into the nostril(s) as directed by provider Daily., Disp: 16 g, Rfl: 3    gabapentin (NEURONTIN) 300 MG capsule, TAKE 1 CAPSULE BY MOUTH TWICE A DAY, Disp: 180 capsule, Rfl: 3    levothyroxine (SYNTHROID, LEVOTHROID) 88 MCG tablet, Take 1 tablet by mouth Daily., Disp: 90 tablet, Rfl: 3    meclizine (ANTIVERT) 25 MG tablet, TAKE 1 TABLET BY MOUTH 4 (FOUR) TIMES A DAY., Disp: 40 tablet, Rfl: 3    metFORMIN (GLUCOPHAGE) 500 MG tablet, TAKE 1 TABLET BY MOUTH EVERY DAY WITH BREAKFAST, Disp: 90 tablet, Rfl: 3    montelukast (SINGULAIR) 10 MG tablet, TAKE 1 TABLET BY MOUTH EVERY DAY, Disp: 90 tablet, Rfl: 3    Omega-3 Fatty Acids (FISH OIL) 1000 MG capsule capsule, Take 1 capsule by mouth 3 (Three) Times a Day., Disp: , Rfl:     oxybutynin XL (DITROPAN XL) 15 MG 24 hr tablet, TAKE 1 TABLET BY MOUTH EVERY DAY, Disp: 90 tablet, Rfl: 3    QUEtiapine (SEROquel) 25 MG tablet, Take 1 tablet by mouth Every Night., Disp: 90 tablet, Rfl: 3    Review of Systems   Eyes:  Positive for itching.   Neurological:  Positive for speech difficulty and light-headedness.   Psychiatric/Behavioral:  Positive  "for confusion, decreased concentration and hallucinations.    All other systems reviewed and are negative.         Objective:    Vital Signs:   /73   Pulse 64   Ht 167.6 cm (66\")   Wt 89.8 kg (198 lb)   BMI 31.96 kg/m²     Physical Exam  Vitals reviewed.   HENT:      Head: Normocephalic.   Pulmonary:      Effort: Pulmonary effort is normal. No respiratory distress.   Neurological:      Mental Status: She is alert and oriented to person, place, and time.   Psychiatric:         Mood and Affect: Mood normal.        Result Review :                Neurological Exam  Mental Status  Alert. Oriented to person, place, and time.        Assessment and Plan    Diagnoses and all orders for this visit:    1. Mild Lewy body dementia without behavioral disturbance, psychotic disturbance, mood disturbance, or anxiety (Primary)  Overview:  Aricept 10mg bid l. Dx 2022,   mild hallucinations and unsteady gait.   Loss of short term memory seems stable  Followed with Dr. Seipel, neurologist, q6 mos.  Occasional hallucinations and REM sleep behavior disorder dream activity      2. CAMILA (obstructive sleep apnea)  Overview:  She struggles with CPAP, she is off      3. Mild cognitive impairment with memory loss  -     donepezil (ARICEPT) 10 MG tablet; Take 1 tablet by mouth 2 (Two) Times a Day. One  Dispense: 180 tablet; Refill: 3     Increase aricept to 10mg bid    Follow Up   Return in about 1 year (around 11/14/2025).  Patient was given instructions and counseling regarding her condition or for health maintenance advice. Please see specific information pulled into the AVS if appropriate.     This document has been electronically signed by Joseph Seipel, MD on November 14, 2024 09:37 EST      "

## 2024-11-14 ENCOUNTER — OFFICE VISIT (OUTPATIENT)
Dept: NEUROLOGY | Facility: CLINIC | Age: 69
End: 2024-11-14
Payer: COMMERCIAL

## 2024-11-14 VITALS
HEART RATE: 64 BPM | SYSTOLIC BLOOD PRESSURE: 136 MMHG | BODY MASS INDEX: 31.82 KG/M2 | DIASTOLIC BLOOD PRESSURE: 73 MMHG | HEIGHT: 66 IN | WEIGHT: 198 LBS

## 2024-11-14 DIAGNOSIS — G47.33 OSA (OBSTRUCTIVE SLEEP APNEA): ICD-10-CM

## 2024-11-14 DIAGNOSIS — G31.83 MILD LEWY BODY DEMENTIA WITHOUT BEHAVIORAL DISTURBANCE, PSYCHOTIC DISTURBANCE, MOOD DISTURBANCE, OR ANXIETY: Primary | ICD-10-CM

## 2024-11-14 DIAGNOSIS — G31.84 MILD COGNITIVE IMPAIRMENT WITH MEMORY LOSS: ICD-10-CM

## 2024-11-14 DIAGNOSIS — F02.A0 MILD LEWY BODY DEMENTIA WITHOUT BEHAVIORAL DISTURBANCE, PSYCHOTIC DISTURBANCE, MOOD DISTURBANCE, OR ANXIETY: Primary | ICD-10-CM

## 2024-11-14 RX ORDER — DONEPEZIL HYDROCHLORIDE 10 MG/1
10 TABLET, FILM COATED ORAL 2 TIMES DAILY
Qty: 180 TABLET | Refills: 3 | Status: SHIPPED | OUTPATIENT
Start: 2024-11-14

## 2025-01-09 ENCOUNTER — TELEPHONE (OUTPATIENT)
Dept: FAMILY MEDICINE CLINIC | Facility: CLINIC | Age: 70
End: 2025-01-09
Payer: COMMERCIAL

## 2025-01-09 NOTE — TELEPHONE ENCOUNTER
Caller: Genevieve Kuo    Relationship: Self    Best call back number:     What medication are you requesting: ANTIBIOTIC    What are your current symptoms: PAIN IN LOWER SIDE AREA, URINE IS DARK IN COLOR    How long have you been experiencing symptoms: 2 DAYS    Have you had these symptoms before:    [x] Yes  [] No    Have you been treated for these symptoms before:   [x] Yes  [] No    If a prescription is needed, what is your preferred pharmacy and phone number: [id number] - [place], [place] - [address] - [phone] [facility] [phone] FX     Additional notes:  PATIENT IS CALLING IN WITH THE SYMPTOMS LISTED ABOVE AND WANTS TO KNOW IF A MEDICATION CAN BE CALLED IN FOR HER.

## 2025-01-09 NOTE — TELEPHONE ENCOUNTER
Called and let patient know that provider is out of office today. Offered multiple appointments on different days and patient refused. Patient did not want to be seen by on call provider today. Advised patient she needs evaluation however patient refused.     Let patient know would route to PCP for when he is back in the office to review.

## 2025-01-10 RX ORDER — SULFAMETHOXAZOLE AND TRIMETHOPRIM 800; 160 MG/1; MG/1
1 TABLET ORAL 2 TIMES DAILY
Qty: 14 TABLET | Refills: 0 | Status: SHIPPED | OUTPATIENT
Start: 2025-01-10 | End: 2025-01-17

## 2025-01-10 NOTE — TELEPHONE ENCOUNTER
Patient reports pain, dark urine color, and that she done a home urine test strip and its all different colors. No other symptoms.     Multiple appointments offered.

## 2025-01-25 DIAGNOSIS — F51.01 PRIMARY INSOMNIA: ICD-10-CM

## 2025-01-27 RX ORDER — QUETIAPINE FUMARATE 25 MG/1
25 TABLET, FILM COATED ORAL
Qty: 90 TABLET | Refills: 3 | OUTPATIENT
Start: 2025-01-27

## 2025-02-07 DIAGNOSIS — E03.9 ACQUIRED HYPOTHYROIDISM: ICD-10-CM

## 2025-02-07 DIAGNOSIS — G31.83 MILD LEWY BODY DEMENTIA WITHOUT BEHAVIORAL DISTURBANCE, PSYCHOTIC DISTURBANCE, MOOD DISTURBANCE, OR ANXIETY: ICD-10-CM

## 2025-02-07 DIAGNOSIS — F02.A0 MILD LEWY BODY DEMENTIA WITHOUT BEHAVIORAL DISTURBANCE, PSYCHOTIC DISTURBANCE, MOOD DISTURBANCE, OR ANXIETY: ICD-10-CM

## 2025-02-10 RX ORDER — OXYBUTYNIN CHLORIDE 15 MG/1
15 TABLET, EXTENDED RELEASE ORAL DAILY
Qty: 90 TABLET | Refills: 3 | Status: SHIPPED | OUTPATIENT
Start: 2025-02-10

## 2025-02-10 RX ORDER — LEVOTHYROXINE SODIUM 100 UG/1
100 TABLET ORAL DAILY
Qty: 90 TABLET | Refills: 3 | OUTPATIENT
Start: 2025-02-10

## 2025-02-14 NOTE — PROGRESS NOTES
"Chief Complaint  Blood Sugar Problem    Subjective          Genevieve Kuo presents to [facility] for     HPI    Prediabetes  Patient reports is not following low concentrated sweets diet.   Patient reports are not checking blood sugar at home.   Patient reports are not exercising.   Last A1c was 6.2 on 11/08/2024.     Back pain  Chronic for several years.  Has worsened in recent weeks.  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  Lower back pain, bilateral sciatica, R > L.  Completed PT a few years ago, no improvement.  Taking extra strength Tylenol with some relief.  Denies motor weakness, saddle anesthesia, incontinence.    Objective   Vital Signs:   /64 (BP Location: Right arm, Patient Position: Sitting, Cuff Size: Adult)   Pulse 78   Temp 98 °F (36.7 °C) (Temporal)   Resp 18   Ht 167.6 cm (66\")   Wt 93.6 kg (206 lb 6.4 oz)   SpO2 95% Comment: ra  BMI 33.31 kg/m²     Physical Exam  Vitals and nursing note reviewed.   Constitutional:       General: She is not in acute distress.     Appearance: Normal appearance. She is not ill-appearing or diaphoretic.   HENT:      Head: Normocephalic and atraumatic.      Nose: No congestion or rhinorrhea.   Eyes:      Extraocular Movements: Extraocular movements intact.      Pupils: Pupils are equal, round, and reactive to light.   Cardiovascular:      Rate and Rhythm: Normal rate and regular rhythm.      Pulses: Normal pulses.   Pulmonary:      Effort: Pulmonary effort is normal.      Breath sounds: Normal breath sounds.   Musculoskeletal:         General: Normal range of motion.      Cervical back: Normal range of motion and neck supple.      Lumbar back: Tenderness (bilateral) present. No deformity or bony tenderness. Negative right straight leg raise test and negative left straight leg raise test.   Skin:     General: Skin is warm and dry.      Capillary Refill: Capillary refill takes less than 2 seconds.   Neurological: "      Mental Status: She is alert and oriented to person, place, and time.      Sensory: No sensory deficit.      Motor: No weakness.   Psychiatric:         Mood and Affect: Mood normal.         Behavior: Behavior normal.        Result Review :                 Assessment and Plan    Diagnoses and all orders for this visit:    1. Type 2 diabetes mellitus without complication, without long-term current use of insulin (Primary)  Overview:  Regimen: Metformin 500 mg qd  Took Ozempic previously - stopped taking it, does not desire additional medication.    A1c 6.1 02/17/2025 Stable  A1c 6.2 11/08/2024  A1c 6.1 08/06/2024  A1c 5.7 04/23/2024  A1c 6.4 01/18/2024   A1c 6.0 01/03/2023  A1c 5.9 08/14/2022  A1c 6.0 06/14/2022  A1c 6.5 01/2022    Assessment & Plan:  Desires GLP-1 for BG as well as weight loss.  Does not like injections.  Patient agreeable to Rybelsus.  3 mg x 30 days sample provided in the office, increase to 7 mg qd thereafter.  We will reassess in 2-3 months.    Orders:  -     POC Glycosylated Hemoglobin (Hb A1C)  -     Semaglutide (Rybelsus) 7 MG tablet; Take 7 mg by mouth Daily. Do not start taking until you have completed 1 month of the 3 mg daily dose.  Dispense: 90 tablet; Refill: 3    2. Chronic seasonal allergic rhinitis due to pollen  Overview:  Regimen: Zyrtec 10 mg qd, Singulair 10 mg qd  With reactive airways and wheezing. Is requiring albuterol 1-2x daily.    Assessment & Plan:  Question underlying obstructive disease like asthma.  Is requiring daily albuterol.  We will evaluate further with PFTs and get on maintenance inhaler if indicated.    Orders:  -     albuterol sulfate  (90 Base) MCG/ACT inhaler; Inhale 2 puffs Every 4 (Four) Hours As Needed for Wheezing.  Dispense: 18 g; Refill: 12  -     Complete PFT - Pre & Post Bronchodilator; Future    3. Wheezing  -     Complete PFT - Pre & Post Bronchodilator; Future    4. Intervertebral lumbar disc disorder with myelopathy, lumbar  region  Overview:  Bilateral lower back pain with bilateral sciatica.  Lumbar xray 2021:  Mild anterolisthesis of L4 and L5 and L5 on S1 related to facet  arthropathy.    Assessment & Plan:  Acute on chronic.  No red flag features.  Previously followed with pain management, got injections which helped. Has been lost to follow up.  Does not desire further PT - last round did not help.  Is declining referral back to pain management at this time - would like to see how she does with anti inflammatories.  Allergic to NSAIDs.  Prednisone taper, baclofen prn.    Orders:  -     baclofen (LIORESAL) 10 MG tablet; Take 1 tablet by mouth 2 (Two) Times a Day As Needed for Muscle Spasms.  Dispense: 12 tablet; Refill: 0  -     predniSONE (DELTASONE) 10 MG tablet; Take 4 tablets by mouth Daily for 3 days, THEN 2 tablets Daily for 3 days, THEN 1 tablet Daily for 3 days.  Dispense: 21 tablet; Refill: 0         Follow Up   Return in about 3 months (around 5/17/2025).  Patient Instructions   Rest back, avoid lifting, pulling, pushing, or straining of back. Do not spend all day lying/sitting, get up and move around as tolerated.  Apply intermittent application of cold packs for no longer than 5 minutes at a time (later, may switch to heat, but do not sleep on heating pad).  Pain medications as prescribed.  Do back stretching exercises as directed.  Call or return to clinic in 7-10 days if symptoms worsen or fail to improve as anticipated.  If any loss of sensation in extremities, loss of bowel/bladder function, inability to walk, or if pain becomes severe, report to ER immediately for further evaluation.     Meet Rodriguez MD    NOTE: [facility], per Health Information accessibility laws, allows progress notes to be visible to patients. Please note that these notes will include professional medical terminology that may be somewhat confusing without some interpretation from your medical professional team. The intent of progress notes  is to communicate information between any medical professionals involved in your case, or to serve as future reference for myself or any other provider when reviewing your case, as well as a reference for the patient viewing the record. Please ask a member of the medical team if you have any questions about terminology or content of note.    DISCLAIMER: Part of this note may be an electronic transcription/translation of spoken language to printed text using the Dragon Dictation System.

## 2025-02-17 ENCOUNTER — OFFICE VISIT (OUTPATIENT)
Dept: FAMILY MEDICINE CLINIC | Facility: CLINIC | Age: 70
End: 2025-02-17
Payer: COMMERCIAL

## 2025-02-17 VITALS
BODY MASS INDEX: 33.17 KG/M2 | TEMPERATURE: 98 F | HEIGHT: 66 IN | OXYGEN SATURATION: 95 % | RESPIRATION RATE: 18 BRPM | WEIGHT: 206.4 LBS | DIASTOLIC BLOOD PRESSURE: 64 MMHG | HEART RATE: 78 BPM | SYSTOLIC BLOOD PRESSURE: 112 MMHG

## 2025-02-17 DIAGNOSIS — J30.1 CHRONIC SEASONAL ALLERGIC RHINITIS DUE TO POLLEN: ICD-10-CM

## 2025-02-17 DIAGNOSIS — E11.9 TYPE 2 DIABETES MELLITUS WITHOUT COMPLICATION, WITHOUT LONG-TERM CURRENT USE OF INSULIN: Primary | ICD-10-CM

## 2025-02-17 DIAGNOSIS — R06.2 WHEEZING: ICD-10-CM

## 2025-02-17 DIAGNOSIS — M51.06 INTERVERTEBRAL LUMBAR DISC DISORDER WITH MYELOPATHY, LUMBAR REGION: ICD-10-CM

## 2025-02-17 LAB
EXPIRATION DATE: ABNORMAL
HBA1C MFR BLD: 6.1 % (ref 4.5–5.7)
Lab: ABNORMAL

## 2025-02-17 PROCEDURE — 3044F HG A1C LEVEL LT 7.0%: CPT

## 2025-02-17 PROCEDURE — 1126F AMNT PAIN NOTED NONE PRSNT: CPT

## 2025-02-17 PROCEDURE — 83036 HEMOGLOBIN GLYCOSYLATED A1C: CPT

## 2025-02-17 PROCEDURE — 99214 OFFICE O/P EST MOD 30 MIN: CPT

## 2025-02-17 RX ORDER — ALBUTEROL SULFATE 0.83 MG/ML
SOLUTION RESPIRATORY (INHALATION) AS NEEDED
Status: CANCELLED | OUTPATIENT
Start: 2025-02-17

## 2025-02-17 RX ORDER — PREDNISONE 10 MG/1
TABLET ORAL
Qty: 21 TABLET | Refills: 0 | Status: SHIPPED | OUTPATIENT
Start: 2025-02-17 | End: 2025-02-26

## 2025-02-17 RX ORDER — ORAL SEMAGLUTIDE 7 MG/1
7 TABLET ORAL DAILY
Qty: 90 TABLET | Refills: 3 | Status: SHIPPED | OUTPATIENT
Start: 2025-02-17

## 2025-02-17 RX ORDER — BACLOFEN 10 MG/1
10 TABLET ORAL 2 TIMES DAILY PRN
Qty: 12 TABLET | Refills: 0 | Status: SHIPPED | OUTPATIENT
Start: 2025-02-17

## 2025-02-17 RX ORDER — ALBUTEROL SULFATE 90 UG/1
2 INHALANT RESPIRATORY (INHALATION) EVERY 4 HOURS PRN
Qty: 18 G | Refills: 12 | Status: SHIPPED | OUTPATIENT
Start: 2025-02-17

## 2025-02-17 NOTE — ASSESSMENT & PLAN NOTE
Acute on chronic.  No red flag features.  Previously followed with pain management, got injections which helped. Has been lost to follow up.  Does not desire further PT - last round did not help.  Is declining referral back to pain management at this time - would like to see how she does with anti inflammatories.  Allergic to NSAIDs.  Prednisone taper, baclofen prn.

## 2025-02-17 NOTE — ASSESSMENT & PLAN NOTE
Desires GLP-1 for BG as well as weight loss.  Does not like injections.  Patient agreeable to Rybelsus.  3 mg x 30 days sample provided in the office, increase to 7 mg qd thereafter.  We will reassess in 2-3 months.

## 2025-02-17 NOTE — ASSESSMENT & PLAN NOTE
Question underlying obstructive disease like asthma.  Is requiring daily albuterol.  We will evaluate further with PFTs and get on maintenance inhaler if indicated.

## 2025-02-17 NOTE — PATIENT INSTRUCTIONS
Rest back, avoid lifting, pulling, pushing, or straining of back. Do not spend all day lying/sitting, get up and move around as tolerated.  Apply intermittent application of cold packs for no longer than 5 minutes at a time (later, may switch to heat, but do not sleep on heating pad).  Pain medications as prescribed.  Do back stretching exercises as directed.  Call or return to clinic in 7-10 days if symptoms worsen or fail to improve as anticipated.  If any loss of sensation in extremities, loss of bowel/bladder function, inability to walk, or if pain becomes severe, report to ER immediately for further evaluation.

## 2025-02-21 ENCOUNTER — TELEPHONE (OUTPATIENT)
Dept: FAMILY MEDICINE CLINIC | Facility: CLINIC | Age: 70
End: 2025-02-21
Payer: COMMERCIAL

## 2025-02-21 NOTE — TELEPHONE ENCOUNTER
Caller: Genevieve Kuo    Relationship: Self    Best call back number:     [phone]       Which medication are you concerned about: Semaglutide (Rybelsus) 7 MG tablet     Who prescribed you this medication: Meet Rodriguez MD     When did you start taking this medication: NA    What are your concerns:   MEDICATION IS TOO EXPENSIVE.  OUT OF POCKET IS $400  IS THERE A SIMILAR MEDICATION THAT IS CHEAPER?

## 2025-02-26 NOTE — TELEPHONE ENCOUNTER
Per Dr. Rodriguez :     We need to find out which alternative their insurance prefers. Given Medicare Advantage I'm assuming they have Part D coverage and should be able to get one of the equivalent alternatives. Have we gotten a letter from them? Please have them call their insurance to inquire. Or look into it if we are able to. Thank you.       Called and LMOM for the patient asking her If she has received anything from her insurances as to what they are wanting prescribed for her as the alternative. Advised if she can get that to us if she has it and or if she can call them  and see what it is they are asking for and get that to us as well will help us to know what it is they are wanting.

## 2025-03-03 ENCOUNTER — APPOINTMENT (OUTPATIENT)
Dept: MRI IMAGING | Facility: HOSPITAL | Age: 70
End: 2025-03-03
Payer: COMMERCIAL

## 2025-03-03 ENCOUNTER — APPOINTMENT (OUTPATIENT)
Dept: CT IMAGING | Facility: HOSPITAL | Age: 70
End: 2025-03-03
Payer: COMMERCIAL

## 2025-03-03 ENCOUNTER — APPOINTMENT (OUTPATIENT)
Dept: GENERAL RADIOLOGY | Facility: HOSPITAL | Age: 70
End: 2025-03-03
Payer: COMMERCIAL

## 2025-03-03 ENCOUNTER — HOSPITAL ENCOUNTER (OUTPATIENT)
Facility: HOSPITAL | Age: 70
Setting detail: OBSERVATION
Discharge: HOME | End: 2025-03-04
Attending: EMERGENCY MEDICINE | Admitting: STUDENT IN AN ORGANIZED HEALTH CARE EDUCATION/TRAINING PROGRAM
Payer: COMMERCIAL

## 2025-03-03 DIAGNOSIS — J44.9 CHRONIC OBSTRUCTIVE PULMONARY DISEASE, UNSPECIFIED COPD TYPE: ICD-10-CM

## 2025-03-03 DIAGNOSIS — E03.9 ACQUIRED HYPOTHYROIDISM: ICD-10-CM

## 2025-03-03 DIAGNOSIS — G45.9 TIA (TRANSIENT ISCHEMIC ATTACK): Primary | ICD-10-CM

## 2025-03-03 DIAGNOSIS — G47.33 OSA (OBSTRUCTIVE SLEEP APNEA): ICD-10-CM

## 2025-03-03 PROBLEM — R29.90 STROKE-LIKE SYMPTOMS: Status: ACTIVE | Noted: 2025-03-03

## 2025-03-03 LAB
ALBUMIN SERPL-MCNC: 4.5 G/DL (ref 3.5–5.2)
ALBUMIN/GLOB SERPL: 1.9 G/DL
ALP SERPL-CCNC: 86 U/L (ref 39–117)
ALT SERPL W P-5'-P-CCNC: 14 U/L (ref 1–33)
ANION GAP SERPL CALCULATED.3IONS-SCNC: 10.5 MMOL/L (ref 5–15)
AST SERPL-CCNC: 18 U/L (ref 1–32)
BASOPHILS # BLD AUTO: 0.03 10*3/MM3 (ref 0–0.2)
BASOPHILS NFR BLD AUTO: 0.3 % (ref 0–1.5)
BILIRUB SERPL-MCNC: 0.3 MG/DL (ref 0–1.2)
BILIRUB UR QL STRIP: NEGATIVE
BUN SERPL-MCNC: 20 MG/DL (ref 8–23)
BUN/CREAT SERPL: 23 (ref 7–25)
CALCIUM SPEC-SCNC: 9.5 MG/DL (ref 8.6–10.5)
CHLORIDE SERPL-SCNC: 106 MMOL/L (ref 98–107)
CK SERPL-CCNC: 39 U/L (ref 20–180)
CLARITY UR: CLEAR
CO2 SERPL-SCNC: 24.5 MMOL/L (ref 22–29)
COLOR UR: YELLOW
CREAT SERPL-MCNC: 0.87 MG/DL (ref 0.57–1)
DEPRECATED RDW RBC AUTO: 43.8 FL (ref 37–54)
EGFRCR SERPLBLD CKD-EPI 2021: 70.9 ML/MIN/1.73
EOSINOPHIL # BLD AUTO: 0.02 10*3/MM3 (ref 0–0.4)
EOSINOPHIL NFR BLD AUTO: 0.2 % (ref 0.3–6.2)
ERYTHROCYTE [DISTWIDTH] IN BLOOD BY AUTOMATED COUNT: 12.9 % (ref 12.3–15.4)
GLOBULIN UR ELPH-MCNC: 2.4 GM/DL
GLUCOSE BLDC GLUCOMTR-MCNC: 105 MG/DL (ref 70–105)
GLUCOSE BLDC GLUCOMTR-MCNC: 96 MG/DL (ref 70–105)
GLUCOSE SERPL-MCNC: 97 MG/DL (ref 65–99)
GLUCOSE UR STRIP-MCNC: NEGATIVE MG/DL
HCT VFR BLD AUTO: 42.1 % (ref 34–46.6)
HGB BLD-MCNC: 13.5 G/DL (ref 12–15.9)
HGB UR QL STRIP.AUTO: NEGATIVE
HOLD SPECIMEN: NORMAL
IMM GRANULOCYTES # BLD AUTO: 0.07 10*3/MM3 (ref 0–0.05)
IMM GRANULOCYTES NFR BLD AUTO: 0.6 % (ref 0–0.5)
KETONES UR QL STRIP: NEGATIVE
LEUKOCYTE ESTERASE UR QL STRIP.AUTO: NEGATIVE
LYMPHOCYTES # BLD AUTO: 3.75 10*3/MM3 (ref 0.7–3.1)
LYMPHOCYTES NFR BLD AUTO: 33.1 % (ref 19.6–45.3)
MCH RBC QN AUTO: 29.5 PG (ref 26.6–33)
MCHC RBC AUTO-ENTMCNC: 32.1 G/DL (ref 31.5–35.7)
MCV RBC AUTO: 92.1 FL (ref 79–97)
MONOCYTES # BLD AUTO: 0.79 10*3/MM3 (ref 0.1–0.9)
MONOCYTES NFR BLD AUTO: 7 % (ref 5–12)
NEUTROPHILS NFR BLD AUTO: 58.8 % (ref 42.7–76)
NEUTROPHILS NFR BLD AUTO: 6.68 10*3/MM3 (ref 1.7–7)
NITRITE UR QL STRIP: NEGATIVE
NRBC BLD AUTO-RTO: 0 /100 WBC (ref 0–0.2)
PH UR STRIP.AUTO: 5.5 [PH] (ref 5–8)
PLATELET # BLD AUTO: 296 10*3/MM3 (ref 140–450)
PMV BLD AUTO: 10.4 FL (ref 6–12)
POTASSIUM SERPL-SCNC: 3.5 MMOL/L (ref 3.5–5.2)
PROT SERPL-MCNC: 6.9 G/DL (ref 6–8.5)
PROT UR QL STRIP: NEGATIVE
RBC # BLD AUTO: 4.57 10*6/MM3 (ref 3.77–5.28)
SODIUM SERPL-SCNC: 141 MMOL/L (ref 136–145)
SP GR UR STRIP: 1.05 (ref 1–1.03)
T4 FREE SERPL-MCNC: 1.4 NG/DL (ref 0.93–1.7)
TSH SERPL DL<=0.05 MIU/L-ACNC: 0.64 UIU/ML (ref 0.27–4.2)
UROBILINOGEN UR QL STRIP: ABNORMAL
WBC NRBC COR # BLD AUTO: 11.34 10*3/MM3 (ref 3.4–10.8)
WHOLE BLOOD HOLD COAG: NORMAL

## 2025-03-03 PROCEDURE — G0378 HOSPITAL OBSERVATION PER HR: HCPCS

## 2025-03-03 PROCEDURE — 70496 CT ANGIOGRAPHY HEAD: CPT

## 2025-03-03 PROCEDURE — 82550 ASSAY OF CK (CPK): CPT | Performed by: EMERGENCY MEDICINE

## 2025-03-03 PROCEDURE — 84439 ASSAY OF FREE THYROXINE: CPT | Performed by: EMERGENCY MEDICINE

## 2025-03-03 PROCEDURE — 70450 CT HEAD/BRAIN W/O DYE: CPT

## 2025-03-03 PROCEDURE — 96372 THER/PROPH/DIAG INJ SC/IM: CPT

## 2025-03-03 PROCEDURE — 93005 ELECTROCARDIOGRAM TRACING: CPT | Performed by: EMERGENCY MEDICINE

## 2025-03-03 PROCEDURE — 82607 VITAMIN B-12: CPT | Performed by: NURSE PRACTITIONER

## 2025-03-03 PROCEDURE — 80053 COMPREHEN METABOLIC PANEL: CPT | Performed by: EMERGENCY MEDICINE

## 2025-03-03 PROCEDURE — 82948 REAGENT STRIP/BLOOD GLUCOSE: CPT

## 2025-03-03 PROCEDURE — 25510000001 IOPAMIDOL PER 1 ML: Performed by: EMERGENCY MEDICINE

## 2025-03-03 PROCEDURE — 99285 EMERGENCY DEPT VISIT HI MDM: CPT

## 2025-03-03 PROCEDURE — 71045 X-RAY EXAM CHEST 1 VIEW: CPT

## 2025-03-03 PROCEDURE — 70498 CT ANGIOGRAPHY NECK: CPT

## 2025-03-03 PROCEDURE — 70551 MRI BRAIN STEM W/O DYE: CPT

## 2025-03-03 PROCEDURE — 84443 ASSAY THYROID STIM HORMONE: CPT | Performed by: EMERGENCY MEDICINE

## 2025-03-03 PROCEDURE — 25010000002 ENOXAPARIN PER 10 MG

## 2025-03-03 PROCEDURE — 81003 URINALYSIS AUTO W/O SCOPE: CPT | Performed by: EMERGENCY MEDICINE

## 2025-03-03 PROCEDURE — 85025 COMPLETE CBC W/AUTO DIFF WBC: CPT | Performed by: EMERGENCY MEDICINE

## 2025-03-03 RX ORDER — IBUPROFEN 600 MG/1
1 TABLET ORAL
Status: DISCONTINUED | OUTPATIENT
Start: 2025-03-03 | End: 2025-03-04 | Stop reason: HOSPADM

## 2025-03-03 RX ORDER — SODIUM CHLORIDE 0.9 % (FLUSH) 0.9 %
10 SYRINGE (ML) INJECTION EVERY 12 HOURS SCHEDULED
Status: DISCONTINUED | OUTPATIENT
Start: 2025-03-03 | End: 2025-03-04 | Stop reason: HOSPADM

## 2025-03-03 RX ORDER — BISACODYL 5 MG/1
5 TABLET, DELAYED RELEASE ORAL DAILY PRN
Status: DISCONTINUED | OUTPATIENT
Start: 2025-03-03 | End: 2025-03-04 | Stop reason: HOSPADM

## 2025-03-03 RX ORDER — LEVOTHYROXINE SODIUM 100 UG/1
100 TABLET ORAL
Status: DISCONTINUED | OUTPATIENT
Start: 2025-03-04 | End: 2025-03-04 | Stop reason: HOSPADM

## 2025-03-03 RX ORDER — INSULIN LISPRO 100 [IU]/ML
2-9 INJECTION, SOLUTION INTRAVENOUS; SUBCUTANEOUS
Status: DISCONTINUED | OUTPATIENT
Start: 2025-03-03 | End: 2025-03-04 | Stop reason: HOSPADM

## 2025-03-03 RX ORDER — NITROGLYCERIN 0.4 MG/1
0.4 TABLET SUBLINGUAL
Status: DISCONTINUED | OUTPATIENT
Start: 2025-03-03 | End: 2025-03-04 | Stop reason: HOSPADM

## 2025-03-03 RX ORDER — NICOTINE POLACRILEX 4 MG
15 LOZENGE BUCCAL
Status: DISCONTINUED | OUTPATIENT
Start: 2025-03-03 | End: 2025-03-04 | Stop reason: HOSPADM

## 2025-03-03 RX ORDER — CETIRIZINE HYDROCHLORIDE 10 MG/1
10 TABLET ORAL DAILY
Status: DISCONTINUED | OUTPATIENT
Start: 2025-03-03 | End: 2025-03-04 | Stop reason: HOSPADM

## 2025-03-03 RX ORDER — ACETAMINOPHEN 325 MG/1
650 TABLET ORAL EVERY 4 HOURS PRN
Status: DISCONTINUED | OUTPATIENT
Start: 2025-03-03 | End: 2025-03-04 | Stop reason: HOSPADM

## 2025-03-03 RX ORDER — DONEPEZIL HYDROCHLORIDE 5 MG/1
10 TABLET, FILM COATED ORAL NIGHTLY
Status: DISCONTINUED | OUTPATIENT
Start: 2025-03-03 | End: 2025-03-04 | Stop reason: HOSPADM

## 2025-03-03 RX ORDER — QUETIAPINE FUMARATE 25 MG/1
25 TABLET, FILM COATED ORAL NIGHTLY
Status: DISCONTINUED | OUTPATIENT
Start: 2025-03-03 | End: 2025-03-04 | Stop reason: HOSPADM

## 2025-03-03 RX ORDER — GABAPENTIN 300 MG/1
300 CAPSULE ORAL 2 TIMES DAILY
Status: DISCONTINUED | OUTPATIENT
Start: 2025-03-03 | End: 2025-03-04 | Stop reason: HOSPADM

## 2025-03-03 RX ORDER — MONTELUKAST SODIUM 10 MG/1
10 TABLET ORAL DAILY
Status: DISCONTINUED | OUTPATIENT
Start: 2025-03-03 | End: 2025-03-04 | Stop reason: HOSPADM

## 2025-03-03 RX ORDER — BISACODYL 10 MG
10 SUPPOSITORY, RECTAL RECTAL DAILY PRN
Status: DISCONTINUED | OUTPATIENT
Start: 2025-03-03 | End: 2025-03-04 | Stop reason: HOSPADM

## 2025-03-03 RX ORDER — SODIUM CHLORIDE 0.9 % (FLUSH) 0.9 %
10 SYRINGE (ML) INJECTION AS NEEDED
Status: DISCONTINUED | OUTPATIENT
Start: 2025-03-03 | End: 2025-03-04 | Stop reason: HOSPADM

## 2025-03-03 RX ORDER — ACETAMINOPHEN 160 MG/5ML
650 SOLUTION ORAL EVERY 4 HOURS PRN
Status: DISCONTINUED | OUTPATIENT
Start: 2025-03-03 | End: 2025-03-04 | Stop reason: HOSPADM

## 2025-03-03 RX ORDER — POLYETHYLENE GLYCOL 3350 17 G/17G
17 POWDER, FOR SOLUTION ORAL DAILY PRN
Status: DISCONTINUED | OUTPATIENT
Start: 2025-03-03 | End: 2025-03-04 | Stop reason: HOSPADM

## 2025-03-03 RX ORDER — ACETAMINOPHEN 650 MG/1
650 SUPPOSITORY RECTAL EVERY 4 HOURS PRN
Status: DISCONTINUED | OUTPATIENT
Start: 2025-03-03 | End: 2025-03-04 | Stop reason: HOSPADM

## 2025-03-03 RX ORDER — DONEPEZIL HYDROCHLORIDE 10 MG/1
10 TABLET, FILM COATED ORAL NIGHTLY
COMMUNITY

## 2025-03-03 RX ORDER — ENOXAPARIN SODIUM 100 MG/ML
40 INJECTION SUBCUTANEOUS DAILY
Status: DISCONTINUED | OUTPATIENT
Start: 2025-03-03 | End: 2025-03-04 | Stop reason: HOSPADM

## 2025-03-03 RX ORDER — AMOXICILLIN 250 MG
2 CAPSULE ORAL 2 TIMES DAILY PRN
Status: DISCONTINUED | OUTPATIENT
Start: 2025-03-03 | End: 2025-03-04 | Stop reason: HOSPADM

## 2025-03-03 RX ORDER — DEXTROSE MONOHYDRATE 25 G/50ML
25 INJECTION, SOLUTION INTRAVENOUS
Status: DISCONTINUED | OUTPATIENT
Start: 2025-03-03 | End: 2025-03-04 | Stop reason: HOSPADM

## 2025-03-03 RX ORDER — IOPAMIDOL 755 MG/ML
100 INJECTION, SOLUTION INTRAVASCULAR
Status: COMPLETED | OUTPATIENT
Start: 2025-03-03 | End: 2025-03-03

## 2025-03-03 RX ORDER — SODIUM CHLORIDE 9 MG/ML
40 INJECTION, SOLUTION INTRAVENOUS AS NEEDED
Status: DISCONTINUED | OUTPATIENT
Start: 2025-03-03 | End: 2025-03-04 | Stop reason: HOSPADM

## 2025-03-03 RX ORDER — ONDANSETRON 4 MG/1
4 TABLET, ORALLY DISINTEGRATING ORAL EVERY 6 HOURS PRN
Status: DISCONTINUED | OUTPATIENT
Start: 2025-03-03 | End: 2025-03-04 | Stop reason: HOSPADM

## 2025-03-03 RX ADMIN — CETIRIZINE HYDROCHLORIDE 10 MG: 10 TABLET, FILM COATED ORAL at 21:22

## 2025-03-03 RX ADMIN — ENOXAPARIN SODIUM 40 MG: 100 INJECTION SUBCUTANEOUS at 21:21

## 2025-03-03 RX ADMIN — MONTELUKAST 10 MG: 10 TABLET, FILM COATED ORAL at 21:22

## 2025-03-03 RX ADMIN — Medication 10 ML: at 21:24

## 2025-03-03 RX ADMIN — IOPAMIDOL 100 ML: 755 INJECTION, SOLUTION INTRAVENOUS at 13:41

## 2025-03-03 RX ADMIN — OXYBUTYNIN CHLORIDE 15 MG: 5 TABLET, EXTENDED RELEASE ORAL at 21:22

## 2025-03-03 RX ADMIN — GABAPENTIN 300 MG: 300 CAPSULE ORAL at 21:22

## 2025-03-03 RX ADMIN — DONEPEZIL HYDROCHLORIDE 10 MG: 5 TABLET, FILM COATED ORAL at 21:22

## 2025-03-03 RX ADMIN — QUETIAPINE FUMARATE 25 MG: 25 TABLET ORAL at 21:22

## 2025-03-03 NOTE — ED PROVIDER NOTES
Subjective   History of Present Illness  70-year-old female presents after being found down in closet.  Last known normal reportedly around 7:30 AM this morning.  Not on any anticoagulation.  Patient reportedly with abnormal speech.   Reportedly some facial droop in triage but I do not appreciate on my assessment.    Further history obtained from patient's .  Last known normal was actually 2 or 3:00 yesterday afternoon he thinks but does not know the exact time.  Actually found abnormal at 6:30 AM this morning.  States she has had some intermittent slurred speech in the past and thought it was secondary to her Lewy body dementia.    Review of Systems  See HPI.  Past Medical History:   Diagnosis Date    Allergic     Anxiety     Arthritis     Asthma     Breast cyst, right 1999    cyst removed    Hyperlipidemia     Hypothyroidism     Low back pain        Allergies   Allergen Reactions    Aspirin Swelling and Unknown - Low Severity    Ibuprofen Unknown - Low Severity       Past Surgical History:   Procedure Laterality Date    BREAST BIOPSY Right     core and sx    BREAST CYST EXCISION Right     CHOLECYSTECTOMY      TOTAL ABDOMINAL HYSTERECTOMY WITH SALPINGO OOPHORECTOMY      heavy bleeding       Family History   Problem Relation Age of Onset    Dementia Mother     Colon cancer Mother     Hypertension Mother     Ovarian cancer Mother 65    Colon cancer Father     COPD Father     Lung cancer Father     Emphysema Father     Heart disease Brother     Hypertension Brother     COPD Brother     Dementia Maternal Aunt     Multiple sclerosis Daughter     Breast cancer Neg Hx        Social History     Socioeconomic History    Marital status:    Tobacco Use    Smoking status: Never     Passive exposure: Never    Smokeless tobacco: Never   Vaping Use    Vaping status: Never Used   Substance and Sexual Activity    Alcohol use: No    Drug use: No    Sexual activity: Defer           Objective   Physical Exam  No acute  "distress, regular rate and rhythm, no tachypnea or increased work of breathing, 4+5 out of strength in all 5 extremities without any unilateral weakness, cranial nerves II through XII intact, I do not appreciate a facial droop that was said in triage, alert and oriented x 2, able to name all objects show, patient with significant tremor making cerebellar testing difficult.  Procedures           ED Course      /59   Pulse 56   Temp 97.3 °F (36.3 °C)   Resp 13   Ht 167.6 cm (66\")   Wt 90.9 kg (200 lb 6.4 oz)   SpO2 93%   BMI 32.35 kg/m²   Labs Reviewed   URINALYSIS W/ MICROSCOPIC IF INDICATED (NO CULTURE) - Abnormal; Notable for the following components:       Result Value    Specific Gravity, UA 1.048 (*)     All other components within normal limits    Narrative:     Urine microscopic not indicated.   CBC WITH AUTO DIFFERENTIAL - Abnormal; Notable for the following components:    WBC 11.34 (*)     Eosinophil % 0.2 (*)     Immature Grans % 0.6 (*)     Lymphocytes, Absolute 3.75 (*)     Immature Grans, Absolute 0.07 (*)     All other components within normal limits   TSH - Normal   T4, FREE - Normal   CK - Normal   POCT GLUCOSE FINGERSTICK - Normal   POCT GLUCOSE FINGERSTICK - Normal   COMPREHENSIVE METABOLIC PANEL    Narrative:     GFR Categories in Chronic Kidney Disease (CKD)      GFR Category          GFR (mL/min/1.73)    Interpretation  G1                     90 or greater         Normal or high (1)  G2                      60-89                Mild decrease (1)  G3a                   45-59                Mild to moderate decrease  G3b                   30-44                Moderate to severe decrease  G4                    15-29                Severe decrease  G5                    14 or less           Kidney failure          (1)In the absence of evidence of kidney disease, neither GFR category G1 or G2 fulfill the criteria for CKD.    eGFR calculation 2021 CKD-EPI creatinine equation, which does " not include race as a factor   BASIC METABOLIC PANEL   MAGNESIUM   CBC WITH AUTO DIFFERENTIAL   POCT GLUCOSE FINGERSTICK   POCT GLUCOSE FINGERSTICK   POCT GLUCOSE FINGERSTICK   POCT GLUCOSE FINGERSTICK   POCT GLUCOSE FINGERSTICK   CBC AND DIFFERENTIAL    Narrative:     The following orders were created for panel order CBC & Differential.  Procedure                               Abnormality         Status                     ---------                               -----------         ------                     CBC Auto Differential[[id number]]        Abnormal            Final result                 Please view results for these tests on the individual orders.   EXTRA TUBES    Narrative:     The following orders were created for panel order Extra Tubes.  Procedure                               Abnormality         Status                     ---------                               -----------         ------                     Gold Top - SST[[id number]]                                   Final result               Light Blue Top[[id number]]                                   Final result                 Please view results for these tests on the individual orders.   GOLD TOP - SST   LIGHT BLUE TOP   CBC AND DIFFERENTIAL    Narrative:     The following orders were created for panel order CBC & Differential.  Procedure                               Abnormality         Status                     ---------                               -----------         ------                     CBC Auto Differential[[id number]]                                                         Please view results for these tests on the individual orders.     Medications   sodium chloride 0.9 % flush 10 mL (has no administration in time range)   sodium chloride 0.9 % flush 10 mL (10 mL Intravenous Given 3/3/25 2124)   sodium chloride 0.9 % flush 10 mL (has no administration in time range)   sodium chloride 0.9 % infusion 40 mL (has no administration in  time range)   nitroglycerin (NITROSTAT) SL tablet 0.4 mg (has no administration in time range)   enoxaparin sodium (LOVENOX) syringe 40 mg (40 mg Subcutaneous Given 3/3/25 2121)   Potassium Replacement - Follow Nurse / BPA Driven Protocol (has no administration in time range)   Magnesium Standard Dose Replacement - Follow Nurse / BPA Driven Protocol (has no administration in time range)   Phosphorus Replacement - Follow Nurse / BPA Driven Protocol (has no administration in time range)   Calcium Replacement - Follow Nurse / BPA Driven Protocol (has no administration in time range)   sennosides-docusate (PERICOLACE) 8.6-50 MG per tablet 2 tablet (has no administration in time range)     And   polyethylene glycol (MIRALAX) packet 17 g (has no administration in time range)     And   bisacodyl (DULCOLAX) EC tablet 5 mg (has no administration in time range)     And   bisacodyl (DULCOLAX) suppository 10 mg (has no administration in time range)   melatonin tablet 5 mg (has no administration in time range)   acetaminophen (TYLENOL) tablet 650 mg (has no administration in time range)     Or   acetaminophen (TYLENOL) 160 MG/5ML oral solution 650 mg (has no administration in time range)     Or   acetaminophen (TYLENOL) suppository 650 mg (has no administration in time range)   ondansetron ODT (ZOFRAN-ODT) disintegrating tablet 4 mg (has no administration in time range)   dextrose (GLUTOSE) oral gel 15 g (has no administration in time range)   dextrose (D50W) (25 g/50 mL) IV injection 25 g (has no administration in time range)   glucagon (GLUCAGEN) injection 1 mg (has no administration in time range)   insulin lispro (HUMALOG/ADMELOG) injection 2-9 Units ( Subcutaneous Not Given 3/3/25 2124)   cetirizine (zyrTEC) tablet 10 mg (10 mg Oral Given 3/3/25 2122)   donepezil (ARICEPT) tablet 10 mg (10 mg Oral Given 3/3/25 2122)   gabapentin (NEURONTIN) capsule 300 mg (300 mg Oral Given 3/3/25 2122)   montelukast (SINGULAIR) tablet 10  mg (10 mg Oral Given 3/3/25 2122)   oxybutynin XL (DITROPAN-XL) 24 hr tablet 15 mg (15 mg Oral Given 3/3/25 2122)   QUEtiapine (SEROquel) tablet 25 mg (25 mg Oral Given 3/3/25 2122)   levothyroxine (SYNTHROID, LEVOTHROID) tablet 100 mcg (has no administration in time range)   iopamidol (ISOVUE-370) 76 % injection 100 mL (100 mL Intravenous Given 3/3/25 1341)     MRI Brain Without Contrast   Final Result   Impression:      1. No acute intracranial finding. No MR evidence of acute or subacute ischemic insult.   2. Features of mild chronic microvascular disease appear similar to the 1/10/2022 MRI brain.            Electronically Signed: Yaa Ralph MD     3/3/2025 4:07 PM EST     Workstation ID: [id number]      CT Head Without Contrast   Final Result   1.No acute intracranial abnormality is identified.   2.Findings compatible with chronic microvascular ischemic change and diffuse cortical atrophy.   3.No acute abnormality is identified within the large arteries of the head or neck.   4.No significant stenosis of the bilateral internal carotid arteries.               Electronically Signed: Arnold Clifton MD     3/3/2025 1:57 PM EST     Workstation ID: [id number]      CT Angiogram Head   Final Result   1.No acute intracranial abnormality is identified.   2.Findings compatible with chronic microvascular ischemic change and diffuse cortical atrophy.   3.No acute abnormality is identified within the large arteries of the head or neck.   4.No significant stenosis of the bilateral internal carotid arteries.               Electronically Signed: Arnold Clifton MD     3/3/2025 1:57 PM EST     Workstation ID: [id number]      CT Angiogram Neck   Final Result   1.No acute intracranial abnormality is identified.   2.Findings compatible with chronic microvascular ischemic change and diffuse cortical atrophy.   3.No acute abnormality is identified within the large arteries of the head or neck.   4.No significant stenosis of the  bilateral internal carotid arteries.               Electronically Signed: Arnold Clifton MD     3/3/2025 1:57 PM EST     Workstation ID: [id number]      XR Chest 1 View   Final Result   Impression:   No acute cardiopulmonary abnormality is identified.            Electronically Signed: Merry Ubaldo     3/3/2025 12:37 PM EST     Workstation ID: [id number]                                  Total (NIH Stroke Scale): 2                      Medical Decision Making  Amount and/or Complexity of Data Reviewed  Labs: ordered.  Radiology: ordered.  ECG/medicine tests: ordered.    Risk  Prescription drug management.  Decision regarding hospitalization.    My interpretation of CT head is no subdural epidural hematoma.  See system for radiology interpretation    Patient with reassuring exam here.  Symptoms and current neuro exam not consistent with large vessel occlusion and patient outside TNK window so code stroke was not initiated.  CT and CTAs reassuring.  Speech at baseline now per .  Reassuring urine.  CK unremarkable.  Admitted to hospitalist service for further workup and evaluation of TIA symptoms    Final diagnoses:   TIA (transient ischemic attack)       ED Disposition  ED Disposition       ED Disposition   Decision to Admit    Condition   --    Comment   Level of Care: Telemetry [5]   Diagnosis: Stroke-like symptoms [646108]   Admitting Physician: MAYLIN VILLAFANA [[id number]]   Attending Physician: MAYLIN VILLAFANA [[id number]]                 No follow-up provider specified.       Medication List        ASK your doctor about these medications      donepezil 10 MG tablet  Commonly known as: ARICEPT  Ask about: Which instructions should I use?                 Sumit Peoples MD  03/04/25 0143

## 2025-03-03 NOTE — H&P
[facility] Medicine Services  History & Physical    Patient Name: Genevieve Kuo  : 1955  MRN: [id number]  Primary Care Physician:  Meet Rodriguez MD  Date of admission: 3/3/2025  Date and Time of Service: 3/3/2025 at 1520    Subjective      Chief Complaint: abnormal speech    History of Present Illness: Genevieve Kuo is a 70 y.o. female with a CMH of Lewy Body dementia, hypothyroidism, HLD, asthma, arthritis, T2DM, anxiety who presented to [facility] on 3/3/2025 with  abnormal speech.    Patient was brought to the ED after being found on the floor in her closet.  Patient has dementia therefore hx is limited.  Patient's  states that she seemed in her usual state of health last night before bed although told the ED provider that her LKN was around 2 or 3pm yesterday.  She got up this morning to go to the bathroom around 6:30 am and he noticed that she was in the bathroom for a while, went to check on her and found her lying on the floor in the closet (the closet is attached to the bathroom).  She seemed confused and was only able to get 1-2 words out at a time and speech was difficult to understand.  She has some speech issues at baseline with some slurring and difficulty with word finding but patient's  said her speech seemed worse this morning than usual.  Patient does not recall falling or the details of this but does remember getting up to use the bathroom.  There was reportedly some facial droop when the patient was being triaged but neither  of patient or ED provider noted this. Patient says she feels weak all over but denies any focal or unilateral weakness. She also denies any vision changes, headache, pain anywhere, SOA, palpitations, dysphagia, paresthesias, GI sx.  When asked about any recent medication changes, patient stated her dose of levothyroxine was changed. On reviewing recent PCP office note, she was prescribed prednisone and baclofen on   which neither patient or  informed me of.    In the ED, CTH and CTA H/N were obtained which did not show any acute findings. Labs were essentially unremarkable except for mild leukocytosis of 11.  UA negative. VSS. She was admitted for further management.     Review of Systems Limited due to underlying dementia.    Personal History     Past Medical History:   Diagnosis Date    Allergic     Anxiety     Arthritis     Asthma     Breast cyst, right 1999    cyst removed    Hyperlipidemia     Hypothyroidism     Low back pain        Past Surgical History:   Procedure Laterality Date    BREAST BIOPSY Right     core and sx    BREAST CYST EXCISION Right     CHOLECYSTECTOMY      TOTAL ABDOMINAL HYSTERECTOMY WITH SALPINGO OOPHORECTOMY      heavy bleeding       Family History: family history includes COPD in her brother and father; Colon cancer in her father and mother; Dementia in her maternal aunt and mother; Emphysema in her father; Heart disease in her brother; Hypertension in her brother and mother; Lung cancer in her father; Multiple sclerosis in her daughter; Ovarian cancer (age of onset: 65) in her mother. Otherwise pertinent FHx was reviewed and not pertinent to current issue.    Social History:  reports that she has never smoked. She has never been exposed to tobacco smoke. She has never used smokeless tobacco. She reports that she does not drink alcohol and does not use drugs.    Home Medications:  Prior to Admission Medications       Prescriptions Last Dose Informant Patient Reported? Taking?    albuterol sulfate  (90 Base) MCG/ACT inhaler   No No    Inhale 2 puffs Every 4 (Four) Hours As Needed for Wheezing.    baclofen (LIORESAL) 10 MG tablet   No No    Take 1 tablet by mouth 2 (Two) Times a Day As Needed for Muscle Spasms.    cetirizine (zyrTEC) 10 MG tablet   Yes Yes    Take 1 tablet by mouth Daily.    donepezil (ARICEPT) 10 MG tablet   Yes Yes    Take 1 tablet by mouth Every Night.    gabapentin  (NEURONTIN) 300 MG capsule   No Yes    TAKE 1 CAPSULE BY MOUTH TWICE A DAY    levothyroxine (SYNTHROID, LEVOTHROID) 88 MCG tablet   No Yes    Take 1 tablet by mouth Daily.    metFORMIN (GLUCOPHAGE) 500 MG tablet   No Yes    TAKE 1 TABLET BY MOUTH EVERY DAY WITH BREAKFAST    montelukast (SINGULAIR) 10 MG tablet   No Yes    TAKE 1 TABLET BY MOUTH EVERY DAY    Omega-3 Fatty Acids (FISH OIL) 1000 MG capsule capsule   Yes Yes    Take 1 capsule by mouth 3 (Three) Times a Day.    oxybutynin XL (DITROPAN XL) 15 MG 24 hr tablet   No Yes    TAKE 1 TABLET BY MOUTH EVERY DAY    QUEtiapine (SEROquel) 25 MG tablet   No Yes    Take 1 tablet by mouth Every Night.              Allergies:  Allergies   Allergen Reactions    Aspirin Swelling and Unknown - Low Severity    Ibuprofen Unknown - Low Severity       Objective      Vitals:   Temp:  [98.1 °F (36.7 °C)] 98.1 °F (36.7 °C)  Heart Rate:  [57-73] 58  Resp:  [16] 16  BP: (114-126)/(65-98) 117/98  Body mass index is 33.31 kg/m².  Physical Exam  Constitutional:       Appearance: Normal appearance.   HENT:      Head: Normocephalic and atraumatic.   Eyes:      Extraocular Movements: Extraocular movements intact.      Pupils: Pupils are equal, round, and reactive to light.   Cardiovascular:      Rate and Rhythm: Normal rate and regular rhythm.   Pulmonary:      Effort: Pulmonary effort is normal.      Breath sounds: Normal breath sounds.   Abdominal:      Palpations: Abdomen is soft.      Tenderness: There is no abdominal tenderness.   Musculoskeletal:         General: Normal range of motion.      Right lower leg: No edema.      Left lower leg: No edema.   Skin:     General: Skin is warm.   Neurological:      Mental Status: She is alert and oriented to person, place, and time.      Sensory: No sensory deficit.      Motor: No weakness.      Comments: Has some slurring of speech which is baseline. No facial droop         Diagnostic Data:  Lab Results (last 24 hours)       Procedure  Component Value Units Date/Time    Urinalysis With Microscopic If Indicated (No Culture) - Urine, Clean Catch [[id number]]  (Abnormal) Collected: 03/03/25 1339    Specimen: Urine, Clean Catch Updated: 03/03/25 1347     Color, UA Yellow     Appearance, UA Clear     pH, UA 5.5     Specific Gravity, UA 1.048     Glucose, UA Negative     Ketones, UA Negative     Bilirubin, UA Negative     Blood, UA Negative     Protein, UA Negative     Leuk Esterase, UA Negative     Nitrite, UA Negative     Urobilinogen, UA 1.0 E.U./dL    Narrative:      Urine microscopic not indicated.    Comprehensive Metabolic Panel [[id number]] Collected: 03/03/25 1217    Specimen: Blood from Arm, Right Updated: 03/03/25 1316     Glucose 97 mg/dL      BUN 20 mg/dL      Creatinine 0.87 mg/dL      Sodium 141 mmol/L      Potassium 3.5 mmol/L      Comment: Slight hemolysis detected by analyzer. Result may be falsely elevated.        Chloride 106 mmol/L      CO2 24.5 mmol/L      Calcium 9.5 mg/dL      Total Protein 6.9 g/dL      Albumin 4.5 g/dL      ALT (SGPT) 14 U/L      AST (SGOT) 18 U/L      Alkaline Phosphatase 86 U/L      Total Bilirubin 0.3 mg/dL      Globulin 2.4 gm/dL      A/G Ratio 1.9 g/dL      BUN/Creatinine Ratio 23.0     Anion Gap 10.5 mmol/L      eGFR 70.9 mL/min/1.73     Narrative:      GFR Categories in Chronic Kidney Disease (CKD)      GFR Category          GFR (mL/min/1.73)    Interpretation  G1                     90 or greater         Normal or high (1)  G2                      60-89                Mild decrease (1)  G3a                   45-59                Mild to moderate decrease  G3b                   30-44                Moderate to severe decrease  G4                    15-29                Severe decrease  G5                    14 or less           Kidney failure          (1)In the absence of evidence of kidney disease, neither GFR category G1 or G2 fulfill the criteria for CKD.    eGFR calculation 2021 CKD-EPI creatinine  equation, which does not include race as a factor    TSH [[id number]]  (Normal) Collected: 03/03/25 1217    Specimen: Blood from Arm, Right Updated: 03/03/25 1312     TSH 0.640 uIU/mL     T4, Free [[id number]]  (Normal) Collected: 03/03/25 1217    Specimen: Blood from Arm, Right Updated: 03/03/25 1312     Free T4 1.40 ng/dL     CK [[id number]]  (Normal) Collected: 03/03/25 1217    Specimen: Blood from Arm, Right Updated: 03/03/25 1312     Creatine Kinase 39 U/L     CBC & Differential [[id number]]  (Abnormal) Collected: 03/03/25 1217    Specimen: Blood from Arm, Right Updated: 03/03/25 1232    Narrative:      The following orders were created for panel order CBC & Differential.  Procedure                               Abnormality         Status                     ---------                               -----------         ------                     CBC Auto Differential[[id number]]        Abnormal            Final result                 Please view results for these tests on the individual orders.    CBC Auto Differential [[id number]]  (Abnormal) Collected: 03/03/25 1217    Specimen: Blood from Arm, Right Updated: 03/03/25 1232     WBC 11.34 10*3/mm3      RBC 4.57 10*6/mm3      Hemoglobin 13.5 g/dL      Hematocrit 42.1 %      MCV 92.1 fL      MCH 29.5 pg      MCHC 32.1 g/dL      RDW 12.9 %      RDW-SD 43.8 fl      MPV 10.4 fL      Platelets 296 10*3/mm3      Neutrophil % 58.8 %      Lymphocyte % 33.1 %      Monocyte % 7.0 %      Eosinophil % 0.2 %      Basophil % 0.3 %      Immature Grans % 0.6 %      Neutrophils, Absolute 6.68 10*3/mm3      Lymphocytes, Absolute 3.75 10*3/mm3      Monocytes, Absolute 0.79 10*3/mm3      Eosinophils, Absolute 0.02 10*3/mm3      Basophils, Absolute 0.03 10*3/mm3      Immature Grans, Absolute 0.07 10*3/mm3      nRBC 0.0 /100 WBC     Extra Tubes [[id number]] Collected: 03/03/25 1217    Specimen: Blood from Arm, Right Updated: 03/03/25 1230    Narrative:      The following orders were created  for panel order Extra Tubes.  Procedure                               Abnormality         Status                     ---------                               -----------         ------                     Gold Top - SST[[id number]]                                   Final result               Light Blue Top[[id number]]                                   Final result                 Please view results for these tests on the individual orders.    Gold Top - SST [[id number]] Collected: 03/03/25 1217    Specimen: Blood from Arm, Right Updated: 03/03/25 1230     Extra Tube Hold for add-ons.     Comment: Auto resulted.       Light Blue Top [[id number]] Collected: 03/03/25 1217    Specimen: Blood from Arm, Right Updated: 03/03/25 1230     Extra Tube Hold for add-ons.     Comment: Auto resulted       POC Glucose Once [[id number]]  (Normal) Collected: 03/03/25 1205    Specimen: Blood Updated: 03/03/25 1206     Glucose 96 mg/dL      Comment: Serial Number: [id number]:  [id number]                Imaging Results (Last 24 Hours)       Procedure Component Value Units Date/Time    CT Head Without Contrast [[id number]] Collected: 03/03/25 1348     Updated: 03/03/25 1359    Narrative:      CT ANGIOGRAM NECK, CT HEAD WO CONTRAST, CT ANGIOGRAM HEAD    Date of Exam: 3/3/2025 1:00 PM EST    Indication: ams, dizziness.    Comparison: None available.    Technique: CTA of the neck was performed before and after the uneventful intravenous administration of iodinated contrast. Reconstructed coronal and sagittal images were also obtained. In addition, a 3-D volume rendered image was created for   interpretation. Automated exposure control and iterative reconstruction methods were used.    FINDINGS:    Vascular Findings:    The right common carotid, internal carotid, middle cerebral, anterior cerebral, vertebral, and posterior cerebral arteries are patent without abrupt cut off or aneurysmal dilation. There is fetal origin of the right posterior  cerebral artery. Canal    The left common carotid, internal carotid, middle cerebral, anterior cerebral, vertebral, and posterior cerebral arteries are patent without abrupt cut off or aneurysmal dilation.    Basilar artery appears patent and appears unremarkable.    Non-vascular Findings:    Foci of periventricular and subcortical white matter hypoattenuation are consistent with chronic, microvascular ischemic change. There is age-related loss of brain parenchymal volume with prominence of sulcation and ventriculomegaly. No significant mass   effect, midline shift, intracranial hemorrhage, or hydrocephalus is identified. No extra-axial fluid collection is identified.   The calvarium and overlying soft tissues are unremarkable. The paranasal sinuses and bilateral mastoid air cells are   adequately aerated. The visualized bony orbits, globes, and retrobulbar soft tissues are unremarkable.    No acute abnormality is identified within the visualized soft tissue or bony structures of the neck.    The visualized lung apices are clear.      Impression:      1.No acute intracranial abnormality is identified.  2.Findings compatible with chronic microvascular ischemic change and diffuse cortical atrophy.  3.No acute abnormality is identified within the large arteries of the head or neck.  4.No significant stenosis of the bilateral internal carotid arteries.          Electronically Signed: Arnold Clifton MD    3/3/2025 1:57 PM EST    Workstation ID: [id number]    CT Angiogram Head [[id number]] Collected: 03/03/25 1348     Updated: 03/03/25 1359    Narrative:      CT ANGIOGRAM NECK, CT HEAD WO CONTRAST, CT ANGIOGRAM HEAD    Date of Exam: 3/3/2025 1:00 PM EST    Indication: ams, dizziness.    Comparison: None available.    Technique: CTA of the neck was performed before and after the uneventful intravenous administration of iodinated contrast. Reconstructed coronal and sagittal images were also obtained. In addition, a 3-D volume  rendered image was created for   interpretation. Automated exposure control and iterative reconstruction methods were used.    FINDINGS:    Vascular Findings:    The right common carotid, internal carotid, middle cerebral, anterior cerebral, vertebral, and posterior cerebral arteries are patent without abrupt cut off or aneurysmal dilation. There is fetal origin of the right posterior cerebral artery. Canal    The left common carotid, internal carotid, middle cerebral, anterior cerebral, vertebral, and posterior cerebral arteries are patent without abrupt cut off or aneurysmal dilation.    Basilar artery appears patent and appears unremarkable.    Non-vascular Findings:    Foci of periventricular and subcortical white matter hypoattenuation are consistent with chronic, microvascular ischemic change. There is age-related loss of brain parenchymal volume with prominence of sulcation and ventriculomegaly. No significant mass   effect, midline shift, intracranial hemorrhage, or hydrocephalus is identified. No extra-axial fluid collection is identified.   The calvarium and overlying soft tissues are unremarkable. The paranasal sinuses and bilateral mastoid air cells are   adequately aerated. The visualized bony orbits, globes, and retrobulbar soft tissues are unremarkable.    No acute abnormality is identified within the visualized soft tissue or bony structures of the neck.    The visualized lung apices are clear.      Impression:      1.No acute intracranial abnormality is identified.  2.Findings compatible with chronic microvascular ischemic change and diffuse cortical atrophy.  3.No acute abnormality is identified within the large arteries of the head or neck.  4.No significant stenosis of the bilateral internal carotid arteries.          Electronically Signed: Arnold Clifton MD    3/3/2025 1:57 PM EST    Workstation ID: [id number]    CT Angiogram Neck [[id number]] Collected: 03/03/25 1348     Updated: 03/03/25 6681     Narrative:      CT ANGIOGRAM NECK, CT HEAD WO CONTRAST, CT ANGIOGRAM HEAD    Date of Exam: 3/3/2025 1:00 PM EST    Indication: ams, dizziness.    Comparison: None available.    Technique: CTA of the neck was performed before and after the uneventful intravenous administration of iodinated contrast. Reconstructed coronal and sagittal images were also obtained. In addition, a 3-D volume rendered image was created for   interpretation. Automated exposure control and iterative reconstruction methods were used.    FINDINGS:    Vascular Findings:    The right common carotid, internal carotid, middle cerebral, anterior cerebral, vertebral, and posterior cerebral arteries are patent without abrupt cut off or aneurysmal dilation. There is fetal origin of the right posterior cerebral artery. Canal    The left common carotid, internal carotid, middle cerebral, anterior cerebral, vertebral, and posterior cerebral arteries are patent without abrupt cut off or aneurysmal dilation.    Basilar artery appears patent and appears unremarkable.    Non-vascular Findings:    Foci of periventricular and subcortical white matter hypoattenuation are consistent with chronic, microvascular ischemic change. There is age-related loss of brain parenchymal volume with prominence of sulcation and ventriculomegaly. No significant mass   effect, midline shift, intracranial hemorrhage, or hydrocephalus is identified. No extra-axial fluid collection is identified.   The calvarium and overlying soft tissues are unremarkable. The paranasal sinuses and bilateral mastoid air cells are   adequately aerated. The visualized bony orbits, globes, and retrobulbar soft tissues are unremarkable.    No acute abnormality is identified within the visualized soft tissue or bony structures of the neck.    The visualized lung apices are clear.      Impression:      1.No acute intracranial abnormality is identified.  2.Findings compatible with chronic microvascular  ischemic change and diffuse cortical atrophy.  3.No acute abnormality is identified within the large arteries of the head or neck.  4.No significant stenosis of the bilateral internal carotid arteries.          Electronically Signed: Arnold Clifton MD    3/3/2025 1:57 PM EST    Workstation ID: [id number]    XR Chest 1 View [[id number]] Collected: 03/03/25 1237     Updated: 03/03/25 1240    Narrative:      XR CHEST 1 VW    Date of Exam: 3/3/2025 12:34 PM EST    Indication: ams    Comparison: Two-view chest x-ray 12/29/2009.    Findings:  Allowing for relatively low lung volumes, lungs appear clear. No pneumothorax or large pleural effusion is seen. Cardiomediastinal contours appear within normal limits.      Impression:      Impression:  No acute cardiopulmonary abnormality is identified.        Electronically Signed: Merry Conner    3/3/2025 12:37 PM EST    Workstation ID: [id number]              Assessment & Plan        This is a 70 y.o. female with:    Active and Resolved Problems  Active Hospital Problems    Diagnosis  POA    **Stroke-like symptoms [R29.90]  Yes      Resolved Hospital Problems   No resolved problems to display.       Speech disturbance  -Patient presented with change in speech where she was only able to speak in 1-2 word sentences and was difficult to understand. At baseline she has some slurring and trouble with word finding.  She does not seem overly confused but does have some problem recalling details which is apparently baseline for her. She does not have any other focal neurological deficits.  -CTH and CTA H/N reviewed and negative for any acute findings  -MRI Brain ordered -> negative  -Per , her speech seems to have returned to her baseline but SLP consulted for evaluation prior to anything p.o.     Generalized weakness  Fall  -PT/OT consulted  -Noted she was recently prescribed prednisone and baclofen which may be contributory. Will hold baclofen. On gabapentin as well, no recent  change in dose. Will continue cautiously    Hypothyroidism  -Continue levothyroxine    T2DM  -SSI ACHS  -CCD  -Hypoglycemia protocol in place    Lewy body dementia  -Mentation is currently baseline per   -Continue donepezil  -Follows with Dr. Seipel    Asthma  -Continue Singulair      VTE Prophylaxis:  Pharmacologic VTE prophylaxis orders are signed & held.          The patient desires to be as follows:    CODE STATUS:    Code Status (Patient has no pulse and is not breathing): CPR (Attempt to Resuscitate)  Medical Interventions (Patient has pulse or is breathing): Full Support        Darius Kuo, who can be contacted at [phone], is the designated person to make medical decisions on the patient's behalf if She is incapable of doing so. This was clarified with patient and/or next of kin on 3/3/2025 during the course of this H&P.    Admission Status:  I believe this patient meets obs status.    Expected Length of Stay: 1 day    PDMP and Medication Dispenses via Sidebar reviewed and consistent with patient reported medications.    I discussed the patient's findings and my recommendations with patient, family, and nursing staff.      Signature:     This document has been electronically signed by Josephine Camarillo PA-C on March 3, 2025 15:06 [facility] Hospitalist Team

## 2025-03-03 NOTE — PLAN OF CARE
Problem: Adult Inpatient Plan of Care  Goal: Plan of Care Review  Outcome: Progressing  Goal: Patient-Specific Goal (Individualized)  Outcome: Progressing  Goal: Absence of Hospital-Acquired Illness or Injury  Outcome: Progressing  Intervention: Identify and Manage Fall Risk  Recent Flowsheet Documentation  Taken 3/3/2025 1730 by Adela Anguiano RN  Safety Promotion/Fall Prevention:   activity supervised   assistive device/personal items within reach   clutter free environment maintained   fall prevention program maintained   nonskid shoes/slippers when out of bed   safety round/check completed   room organization consistent  Intervention: Prevent Skin Injury  Recent Flowsheet Documentation  Taken 3/3/2025 1730 by Adela Anguiano RN  Body Position: neutral body alignment  Goal: Optimal Comfort and Wellbeing  Outcome: Progressing  Intervention: Provide Person-Centered Care  Recent Flowsheet Documentation  Taken 3/3/2025 1730 by Adela Anguiano RN  Trust Relationship/Rapport: care explained  Goal: Readiness for Transition of Care  Outcome: Progressing  Intervention: Mutually Develop Transition Plan  Recent Flowsheet Documentation  Taken 3/3/2025 1756 by Adela Anguiano RN  Transportation Anticipated: family or friend will provide  Patient/Family Anticipated Services at Transition: none  Patient/Family Anticipates Transition to: home with family  Taken 3/3/2025 1754 by Adela Anguiano RN  Equipment Currently Used at Home: cane, quad tip     Problem: Skin Injury Risk Increased  Goal: Skin Health and Integrity  Outcome: Progressing     Problem: Comorbidity Management  Goal: Blood Glucose Level Within Target Range  Outcome: Progressing  Intervention: Monitor and Manage Glycemia  Recent Flowsheet Documentation  Taken 3/3/2025 1730 by Adela Anguiano RN  Medication Review/Management: medications reviewed  Goal: Blood Pressure in Desired Range  Outcome: Progressing  Intervention: Maintain Blood Pressure  Management  Recent Flowsheet Documentation  Taken 3/3/2025 1730 by Adela Anguiano, RN  Medication Review/Management: medications reviewed   Goal Outcome Evaluation:

## 2025-03-04 ENCOUNTER — READMISSION MANAGEMENT (OUTPATIENT)
Dept: CALL CENTER | Facility: HOSPITAL | Age: 70
End: 2025-03-04
Payer: COMMERCIAL

## 2025-03-04 ENCOUNTER — APPOINTMENT (OUTPATIENT)
Dept: CARDIOLOGY | Facility: HOSPITAL | Age: 70
End: 2025-03-04
Payer: COMMERCIAL

## 2025-03-04 VITALS
HEART RATE: 59 BPM | RESPIRATION RATE: 14 BRPM | WEIGHT: 200.4 LBS | OXYGEN SATURATION: 93 % | BODY MASS INDEX: 32.21 KG/M2 | TEMPERATURE: 98.4 F | DIASTOLIC BLOOD PRESSURE: 68 MMHG | HEIGHT: 66 IN | SYSTOLIC BLOOD PRESSURE: 124 MMHG

## 2025-03-04 LAB
ANION GAP SERPL CALCULATED.3IONS-SCNC: 10.3 MMOL/L (ref 5–15)
AORTIC DIMENSIONLESS INDEX: 0.9 (DI)
AV MEAN PRESS GRAD SYS DOP V1V2: 3.9 MMHG
AV VMAX SYS DOP: 133.1 CM/SEC
BASOPHILS # BLD AUTO: 0.05 10*3/MM3 (ref 0–0.2)
BASOPHILS NFR BLD AUTO: 0.6 % (ref 0–1.5)
BH CV ECHO LEFT VENTRICLE GLOBAL LONGITUDINAL STRAIN: -20.5 %
BH CV ECHO MEAS - AO MAX PG: 7.1 MMHG
BH CV ECHO MEAS - AO V2 VTI: 32.3 CM
BH CV ECHO MEAS - AVA(I,D): 2.2 CM2
BH CV ECHO MEAS - EDV(CUBED): 61 ML
BH CV ECHO MEAS - EDV(MOD-SP4): 66.7 ML
BH CV ECHO MEAS - EF(MOD-SP4): 58.9 %
BH CV ECHO MEAS - ESV(CUBED): 15.4 ML
BH CV ECHO MEAS - ESV(MOD-SP4): 27.4 ML
BH CV ECHO MEAS - FS: 36.8 %
BH CV ECHO MEAS - IVS/LVPW: 1.03 CM
BH CV ECHO MEAS - IVSD: 0.94 CM
BH CV ECHO MEAS - LA DIMENSION: 3.3 CM
BH CV ECHO MEAS - LAT PEAK E' VEL: 9.7 CM/SEC
BH CV ECHO MEAS - LV DIASTOLIC VOL/BSA (35-75): 33.3 CM2
BH CV ECHO MEAS - LV MASS(C)D: 111.6 GRAMS
BH CV ECHO MEAS - LV MAX PG: 5.8 MMHG
BH CV ECHO MEAS - LV MEAN PG: 2.9 MMHG
BH CV ECHO MEAS - LV SYSTOLIC VOL/BSA (12-30): 13.7 CM2
BH CV ECHO MEAS - LV V1 MAX: 120 CM/SEC
BH CV ECHO MEAS - LV V1 VTI: 27 CM
BH CV ECHO MEAS - LVIDD: 3.9 CM
BH CV ECHO MEAS - LVIDS: 2.49 CM
BH CV ECHO MEAS - LVOT AREA: 2.6 CM2
BH CV ECHO MEAS - LVOT DIAM: 1.83 CM
BH CV ECHO MEAS - LVPWD: 0.92 CM
BH CV ECHO MEAS - MED PEAK E' VEL: 7.4 CM/SEC
BH CV ECHO MEAS - MR MAX PG: 41.2 MMHG
BH CV ECHO MEAS - MR MAX VEL: 320.8 CM/SEC
BH CV ECHO MEAS - MV A MAX VEL: 87.5 CM/SEC
BH CV ECHO MEAS - MV DEC SLOPE: 316.5 CM/SEC2
BH CV ECHO MEAS - MV DEC TIME: 0.23 SEC
BH CV ECHO MEAS - MV E MAX VEL: 96.4 CM/SEC
BH CV ECHO MEAS - MV E/A: 1.1
BH CV ECHO MEAS - MV MAX PG: 3.5 MMHG
BH CV ECHO MEAS - MV MEAN PG: 1.2 MMHG
BH CV ECHO MEAS - MV P1/2T: 80.2 MSEC
BH CV ECHO MEAS - MV V2 VTI: 30.2 CM
BH CV ECHO MEAS - MVA(P1/2T): 2.7 CM2
BH CV ECHO MEAS - MVA(VTI): 2.35 CM2
BH CV ECHO MEAS - PA ACC TIME: 0.09 SEC
BH CV ECHO MEAS - PA V2 MAX: 90.3 CM/SEC
BH CV ECHO MEAS - PULM A REVS DUR: 0.14 SEC
BH CV ECHO MEAS - PULM A REVS VEL: 32.7 CM/SEC
BH CV ECHO MEAS - PULM DIAS VEL: 50.7 CM/SEC
BH CV ECHO MEAS - PULM S/D: 1.41
BH CV ECHO MEAS - PULM SYS VEL: 71.5 CM/SEC
BH CV ECHO MEAS - RAP SYSTOLE: 8 MMHG
BH CV ECHO MEAS - RV MAX PG: 2.5 MMHG
BH CV ECHO MEAS - RV V1 MAX: 79.7 CM/SEC
BH CV ECHO MEAS - RV V1 VTI: 19.6 CM
BH CV ECHO MEAS - RVSP: 22.6 MMHG
BH CV ECHO MEAS - SV(LVOT): 71 ML
BH CV ECHO MEAS - SV(MOD-SP4): 39.3 ML
BH CV ECHO MEAS - SVI(LVOT): 35.5 ML/M2
BH CV ECHO MEAS - SVI(MOD-SP4): 19.6 ML/M2
BH CV ECHO MEAS - TAPSE (>1.6): 2.14 CM
BH CV ECHO MEAS - TR MAX PG: 14.6 MMHG
BH CV ECHO MEAS - TR MAX VEL: 191.2 CM/SEC
BH CV ECHO MEASUREMENTS AVERAGE E/E' RATIO: 11.27
BH CV ECHO SHUNT ASSESSMENT PERFORMED (HIDDEN SCRIPTING): 1
BH CV XLRA - RV BASE: 3.1 CM
BH CV XLRA - RV LENGTH: 7.7 CM
BH CV XLRA - RV MID: 2.25 CM
BH CV XLRA - TDI S': 12.6 CM/SEC
BUN SERPL-MCNC: 17 MG/DL (ref 8–23)
BUN/CREAT SERPL: 18.9 (ref 7–25)
CALCIUM SPEC-SCNC: 8.8 MG/DL (ref 8.6–10.5)
CHLORIDE SERPL-SCNC: 108 MMOL/L (ref 98–107)
CO2 SERPL-SCNC: 24.7 MMOL/L (ref 22–29)
CREAT SERPL-MCNC: 0.9 MG/DL (ref 0.57–1)
DEPRECATED RDW RBC AUTO: 45 FL (ref 37–54)
EGFRCR SERPLBLD CKD-EPI 2021: 68.1 ML/MIN/1.73
EOSINOPHIL # BLD AUTO: 0.21 10*3/MM3 (ref 0–0.4)
EOSINOPHIL NFR BLD AUTO: 2.4 % (ref 0.3–6.2)
ERYTHROCYTE [DISTWIDTH] IN BLOOD BY AUTOMATED COUNT: 13.1 % (ref 12.3–15.4)
GLUCOSE BLDC GLUCOMTR-MCNC: 80 MG/DL (ref 70–105)
GLUCOSE BLDC GLUCOMTR-MCNC: 88 MG/DL (ref 70–105)
GLUCOSE SERPL-MCNC: 89 MG/DL (ref 65–99)
HBA1C MFR BLD: 6.09 % (ref 4.8–5.6)
HCT VFR BLD AUTO: 39.2 % (ref 34–46.6)
HGB BLD-MCNC: 12.1 G/DL (ref 12–15.9)
IMM GRANULOCYTES # BLD AUTO: 0.05 10*3/MM3 (ref 0–0.05)
IMM GRANULOCYTES NFR BLD AUTO: 0.6 % (ref 0–0.5)
LYMPHOCYTES # BLD AUTO: 4.59 10*3/MM3 (ref 0.7–3.1)
LYMPHOCYTES NFR BLD AUTO: 51.9 % (ref 19.6–45.3)
MAGNESIUM SERPL-MCNC: 2.3 MG/DL (ref 1.6–2.4)
MCH RBC QN AUTO: 28.9 PG (ref 26.6–33)
MCHC RBC AUTO-ENTMCNC: 30.9 G/DL (ref 31.5–35.7)
MCV RBC AUTO: 93.8 FL (ref 79–97)
MONOCYTES # BLD AUTO: 0.49 10*3/MM3 (ref 0.1–0.9)
MONOCYTES NFR BLD AUTO: 5.5 % (ref 5–12)
NEUTROPHILS NFR BLD AUTO: 3.46 10*3/MM3 (ref 1.7–7)
NEUTROPHILS NFR BLD AUTO: 39 % (ref 42.7–76)
NRBC BLD AUTO-RTO: 0 /100 WBC (ref 0–0.2)
PLATELET # BLD AUTO: 272 10*3/MM3 (ref 140–450)
PMV BLD AUTO: 10.1 FL (ref 6–12)
POTASSIUM SERPL-SCNC: 4.1 MMOL/L (ref 3.5–5.2)
QT INTERVAL: 386 MS
QTC INTERVAL: 405 MS
RBC # BLD AUTO: 4.18 10*6/MM3 (ref 3.77–5.28)
SINUS: 3 CM
SODIUM SERPL-SCNC: 143 MMOL/L (ref 136–145)
STJ: 2.43 CM
VIT B12 BLD-MCNC: 495 PG/ML (ref 211–946)
WBC NRBC COR # BLD AUTO: 8.85 10*3/MM3 (ref 3.4–10.8)

## 2025-03-04 PROCEDURE — 93356 MYOCRD STRAIN IMG SPCKL TRCK: CPT | Performed by: INTERNAL MEDICINE

## 2025-03-04 PROCEDURE — 85025 COMPLETE CBC W/AUTO DIFF WBC: CPT

## 2025-03-04 PROCEDURE — 97166 OT EVAL MOD COMPLEX 45 MIN: CPT | Performed by: OCCUPATIONAL THERAPIST

## 2025-03-04 PROCEDURE — 93356 MYOCRD STRAIN IMG SPCKL TRCK: CPT

## 2025-03-04 PROCEDURE — 80048 BASIC METABOLIC PNL TOTAL CA: CPT

## 2025-03-04 PROCEDURE — 82948 REAGENT STRIP/BLOOD GLUCOSE: CPT

## 2025-03-04 PROCEDURE — 93306 TTE W/DOPPLER COMPLETE: CPT | Performed by: INTERNAL MEDICINE

## 2025-03-04 PROCEDURE — G0378 HOSPITAL OBSERVATION PER HR: HCPCS

## 2025-03-04 PROCEDURE — 97162 PT EVAL MOD COMPLEX 30 MIN: CPT

## 2025-03-04 PROCEDURE — 83036 HEMOGLOBIN GLYCOSYLATED A1C: CPT | Performed by: NURSE PRACTITIONER

## 2025-03-04 PROCEDURE — 83735 ASSAY OF MAGNESIUM: CPT

## 2025-03-04 PROCEDURE — 97161 PT EVAL LOW COMPLEX 20 MIN: CPT

## 2025-03-04 PROCEDURE — 93306 TTE W/DOPPLER COMPLETE: CPT

## 2025-03-04 PROCEDURE — 94799 UNLISTED PULMONARY SVC/PX: CPT

## 2025-03-04 RX ORDER — LEVOTHYROXINE SODIUM 88 UG/1
88 TABLET ORAL DAILY
Start: 2025-03-04

## 2025-03-04 RX ORDER — ATORVASTATIN CALCIUM 40 MG/1
40 TABLET, FILM COATED ORAL DAILY
Qty: 90 TABLET | Refills: 0 | Status: SHIPPED | OUTPATIENT
Start: 2025-03-04

## 2025-03-04 RX ORDER — CLOPIDOGREL BISULFATE 75 MG/1
75 TABLET ORAL DAILY
Qty: 30 TABLET | Refills: 0 | Status: SHIPPED | OUTPATIENT
Start: 2025-03-04

## 2025-03-04 RX ADMIN — ACETAMINOPHEN 650 MG: 325 TABLET, FILM COATED ORAL at 09:13

## 2025-03-04 RX ADMIN — GABAPENTIN 300 MG: 300 CAPSULE ORAL at 09:14

## 2025-03-04 RX ADMIN — Medication 10 ML: at 09:14

## 2025-03-04 RX ADMIN — OXYBUTYNIN CHLORIDE 15 MG: 5 TABLET, EXTENDED RELEASE ORAL at 09:13

## 2025-03-04 RX ADMIN — CETIRIZINE HYDROCHLORIDE 10 MG: 10 TABLET, FILM COATED ORAL at 09:14

## 2025-03-04 RX ADMIN — LEVOTHYROXINE SODIUM 100 MCG: 100 TABLET ORAL at 05:10

## 2025-03-04 RX ADMIN — MONTELUKAST 10 MG: 10 TABLET, FILM COATED ORAL at 09:14

## 2025-03-04 NOTE — PLAN OF CARE
"Goal Outcome Evaluation:  Plan of Care Reviewed With: patient, family           Outcome Evaluation: Genevieve Kuo is a 70 y.o. F with a CMH of Lewy Body dementia, hypothyroidism, HLD, asthma, arthritis, T2DM, anxiety who presented to [facility] on 3/3/2025 with abnormal speech. Pt spouse found her on the closet floor and noticed her speech was more altered than normal and she seemed confused. CTH and CTA H/N did not show any acute findings. MRI (-). Pt is A&Ox4 and reports typically IND with ADLs and mobility with SPC. Pt also has a rollator at home but doesn't use it, and a shower chair. Pt lives with spouse in a [facility] with ramp entry. Today pt is SUP for bed mobility, at EOB pt began to feel \"woozy\" and fell toward R side, min assist to find midline progressing to SBA. Pt is SBA to stand from EOB and CGA for ambulation ~100ft with RW, slight LOB at initiation of gait with good righting reactions. Pt BP as follows throughout session 114/62 sitting EOB, 100/57 standing, 106/65 after activity. Pt had no reports of dizziness in standing but did at EOB. Pt educated to wait several minutes between position changes to let dizziness subside before progressing mobility. Pt encouraged to use rollator at home to reduce fall risk. Pt appears to be near baseline but would benefit from OP PT to address balance deficits. PT will sign off.    Anticipated Discharge Disposition (PT): home with outpatient therapy services, home with assist                        "

## 2025-03-04 NOTE — PLAN OF CARE
"Goal Outcome Evaluation:  Plan of Care Reviewed With: patient, daughter           Outcome Evaluation: Pt is a 70 y.o. female with a CMH of Lewy Body dementia, hypothyroidism, HLD, asthma, arthritis, T2DM, anxiety who presented to [facility] on 3/3/2025 with abnormal speech, s/p fall at home. CT of head, CTA H/N & MRI all (-). At baseline, pt lives with spouse in [facility] with ramped entry. She is ind with BADLs & IADLs except she does not cook as she states she can't sequence & recall putting in ingredients. She use a SPC in community & admits to \"furniture walking\" in home. She states she has a rollator & w/c, but doesn't use them. She has a walk-in shower with seat. Upon eval, pt is A&O X4. She scored 4/2 on SBT with \"normal cognition\" result. She demo RUE drift with minimally deficits in GMC, gross strength & dec supination AROM. She demo good vision, sensation & perception. She has minimally dec standing dynamic balance & also showed s/s of symptomatic orthostatic hypotension from sit>stand. She is able to complete BADLs in sitting position. OT will continue to follow for treatment & currently recommends home with family & outpt OT upon discharge for UE strengthening, coordination, balance & activity tolerance.    Anticipated Discharge Disposition (OT): home with assist, home with outpatient therapy services                        "

## 2025-03-04 NOTE — CONSULTS
Primary Care Provider: Provider, No Known     Consult requested by:  Dr. Mejia    Reason for Consultation: Neurological evaluation, dysarthria, right side weakness     History taken from: patient chart family RN    Chief complaint: slurred speech, right side weakness        SUBJECTIVE:    History of present illness: Background per H&P: Genevieve Kuo is a 70 y.o. female with a CMH of Lewy Body dementia, hypothyroidism, HLD, asthma, arthritis, T2DM, anxiety who presented to [facility] on 3/3/2025 with  abnormal speech.     Patient was brought to the ED after being found on the floor in her closet.  Patient has dementia therefore hx is limited.  Patient's  states that she seemed in her usual state of health last night before bed although told the ED provider that her LKN was around 2 or 3pm yesterday.  She got up this morning to go to the bathroom around 6:30 am and he noticed that she was in the bathroom for a while, went to check on her and found her lying on the floor in the closet (the closet is attached to the bathroom).  She seemed confused and was only able to get 1-2 words out at a time and speech was difficult to understand.  She has some speech issues at baseline with some slurring and difficulty with word finding but patient's  said her speech seemed worse this morning than usual.  Patient does not recall falling or the details of this but does remember getting up to use the bathroom.  There was reportedly some facial droop when the patient was being triaged but neither  of patient or ED provider noted this. Patient says she feels weak all over but denies any focal or unilateral weakness. She also denies any vision changes, headache, pain anywhere, SOA, palpitations, dysphagia, paresthesias, GI sx.  When asked about any recent medication changes, patient stated her dose of levothyroxine was changed. On reviewing recent PCP office note, she was prescribed prednisone and  baclofen on 2/17 which neither patient or  informed me of.     In the ED, CTH and CTA H/N were obtained which did not show any acute findings. Labs were essentially unremarkable except for mild leukocytosis of 11.  UA negative. VSS. She was admitted for further management.     - Portions of the above HPI were copied from previous encounters and edited as appropriate. PMH as detailed below.     History obtained from patient, patient's daughter and patient's  via telephone.  Patient is very pleasant and is able to give me history.  She states that she did not lose consciousness although she does not recall how exactly she ended up on the floor.  She denies any other loss of consciousness.   says she has been having mechanical falls but no unexplained falls.  Patient's daughter concerned that her right arm is a little weaker, patient denies feeling weaker on the right side.  Patient states she is ready to go home and is back to her baseline.  Discussed with daughter that MRI brain was negative and further evaluation can be done if patient continues to have right upper extremity weakness.  Patient does have history of neck pain and states she drops things sometimes.  Discussed with her that if that continues to be issue, MRI cervical spine without can be ordered outpatient by her primary care provider.  Patient has an allergy to aspirin.  She denies ever being on a statin or any cholesterol medication.  Questions and concerns answered.    Review of Systems   HENT: Negative.     Eyes:  Negative for visual disturbance.   Respiratory: Negative.     Cardiovascular: Negative.    Gastrointestinal:  Negative for nausea and vomiting.   Endocrine: Negative.    Genitourinary: Negative.    Musculoskeletal: Negative.    Skin: Negative.    Allergic/Immunologic: Negative.    Neurological:  Positive for speech difficulty and weakness. Negative for dizziness, tremors, seizures, syncope, facial asymmetry,  light-headedness, numbness and headaches.   Hematological: Negative.    Psychiatric/Behavioral:  Positive for confusion.            PATIENT HISTORY:  Past Medical History:   Diagnosis Date    Allergic     Anxiety     Arthritis     Asthma     Breast cyst, right 1999    cyst removed    Hyperlipidemia     Hypothyroidism     Low back pain    ,   Past Surgical History:   Procedure Laterality Date    BREAST BIOPSY Right     core and sx    BREAST CYST EXCISION Right     CHOLECYSTECTOMY      TOTAL ABDOMINAL HYSTERECTOMY WITH SALPINGO OOPHORECTOMY      heavy bleeding   ,   Family History   Problem Relation Age of Onset    Dementia Mother     Colon cancer Mother     Hypertension Mother     Ovarian cancer Mother 65    Colon cancer Father     COPD Father     Lung cancer Father     Emphysema Father     Heart disease Brother     Hypertension Brother     COPD Brother     Dementia Maternal Aunt     Multiple sclerosis Daughter     Breast cancer Neg Hx    ,   Social History     Tobacco Use    Smoking status: Never     Passive exposure: Never    Smokeless tobacco: Never   Vaping Use    Vaping status: Never Used   Substance Use Topics    Alcohol use: No    Drug use: No   ,   Prior to Admission medications    Medication Sig Start Date End Date Taking? Authorizing Provider   cetirizine (zyrTEC) 10 MG tablet Take 1 tablet by mouth Daily.   Yes Missael Houston MD   donepezil (ARICEPT) 10 MG tablet Take 1 tablet by mouth Every Night.   Yes Missael Houston MD   gabapentin (NEURONTIN) 300 MG capsule TAKE 1 CAPSULE BY MOUTH TWICE A DAY 9/20/24  Yes Sade Fishman MD   levothyroxine (SYNTHROID, LEVOTHROID) 88 MCG tablet Take 1 tablet by mouth Daily. 11/8/24  Yes Meet Rodriguez MD   metFORMIN (GLUCOPHAGE) 500 MG tablet TAKE 1 TABLET BY MOUTH EVERY DAY WITH BREAKFAST 7/22/24  Yes Sade Fishman MD   montelukast (SINGULAIR) 10 MG tablet TAKE 1 TABLET BY MOUTH EVERY DAY 7/22/24  Yes Sade Fishman MD   Omega-3 Fatty Acids  (FISH OIL) 1000 MG capsule capsule Take 1 capsule by mouth 3 (Three) Times a Day. 3/19/19  Yes Provider, MD Missael   oxybutynin XL (DITROPAN XL) 15 MG 24 hr tablet TAKE 1 TABLET BY MOUTH EVERY DAY 2/10/25  Yes Meet Rodriguez MD   QUEtiapine (SEROquel) 25 MG tablet Take 1 tablet by mouth Every Night. 4/23/24  Yes Sade Fishman MD   albuterol sulfate  (90 Base) MCG/ACT inhaler Inhale 2 puffs Every 4 (Four) Hours As Needed for Wheezing. 2/17/25   Meet Rodriguez MD   baclofen (LIORESAL) 10 MG tablet Take 1 tablet by mouth 2 (Two) Times a Day As Needed for Muscle Spasms. 2/17/25   Meet Rodriguez MD    Allergies:  Aspirin and Ibuprofen    Current Facility-Administered Medications   Medication Dose Route Frequency Provider Last Rate Last Admin    acetaminophen (TYLENOL) tablet 650 mg  650 mg Oral Q4H PRN Josephine Camarillo PA-C   650 mg at 03/04/25 0913    Or    acetaminophen (TYLENOL) 160 MG/5ML oral solution 650 mg  650 mg Oral Q4H PRN Josephine Camarillo PA-C        Or    acetaminophen (TYLENOL) suppository 650 mg  650 mg Rectal Q4H PRN Josephine Camarillo PA-C        sennosides-docusate (PERICOLACE) 8.6-50 MG per tablet 2 tablet  2 tablet Oral BID PRN Josephine Camarillo PA-C        And    polyethylene glycol (MIRALAX) packet 17 g  17 g Oral Daily PRN Josephine Camarillo PA-C        And    bisacodyl (DULCOLAX) EC tablet 5 mg  5 mg Oral Daily PRN Josephine Camarillo PA-C        And    bisacodyl (DULCOLAX) suppository 10 mg  10 mg Rectal Daily PRN Josephine Camarillo PA-C        Calcium Replacement - Follow Nurse / BPA Driven Protocol   Not Applicable PRN Josephine Camarillo PA-C        cetirizine (zyrTEC) tablet 10 mg  10 mg Oral Daily Josephine Camarillo PA-C   10 mg at 03/04/25 0914    dextrose (D50W) (25 g/50 mL) IV injection 25 g  25 g Intravenous Q15 Min PRN Josephine Camarillo PA-C        dextrose (GLUTOSE) oral gel 15 g  15 g Oral Q15 Min PRN Josephine Camarillo PA-C        donepezil (ARICEPT) tablet 10 mg  10 mg Oral Nightly Josephine Camarillo PA-C    10 mg at 03/03/25 2122    enoxaparin sodium (LOVENOX) syringe 40 mg  40 mg Subcutaneous Daily Josephine Camarillo PA-C   40 mg at 03/03/25 2121    gabapentin (NEURONTIN) capsule 300 mg  300 mg Oral BID Josephine Camarillo PA-C   300 mg at 03/04/25 0914    glucagon (GLUCAGEN) injection 1 mg  1 mg Intramuscular Q15 Min PRN Josephine Camarillo PA-C        insulin lispro (HUMALOG/ADMELOG) injection 2-9 Units  2-9 Units Subcutaneous 4x Daily AC & at Bedtime Josephine Camarillo PA-C        levothyroxine (SYNTHROID, LEVOTHROID) tablet 100 mcg  100 mcg Oral Q AM Josephine Camarillo PA-C   100 mcg at 03/04/25 0510    Magnesium Standard Dose Replacement - Follow Nurse / BPA Driven Protocol   Not Applicable PRN Josephine Camarillo PA-C        melatonin tablet 5 mg  5 mg Oral Nightly PRN Josephine Camarillo PA-C        montelukast (SINGULAIR) tablet 10 mg  10 mg Oral Daily Josephine Camarillo PA-C   10 mg at 03/04/25 0914    nitroglycerin (NITROSTAT) SL tablet 0.4 mg  0.4 mg Sublingual Q5 Min PRN Josephine Camarillo PA-C        ondansetron ODT (ZOFRAN-ODT) disintegrating tablet 4 mg  4 mg Oral Q6H PRN Josephine Camarillo PA-C        oxybutynin XL (DITROPAN-XL) 24 hr tablet 15 mg  15 mg Oral Daily Josephine Camarillo PA-C   15 mg at 03/04/25 0913    Phosphorus Replacement - Follow Nurse / BPA Driven Protocol   Not Applicable PRN Josephine Camarillo PA-C        Potassium Replacement - Follow Nurse / BPA Driven Protocol   Not Applicable PRN Josephine Camarillo PA-C        QUEtiapine (SEROquel) tablet 25 mg  25 mg Oral Nightly Josephine Camarillo PA-C   25 mg at 03/03/25 2122    sodium chloride 0.9 % flush 10 mL  10 mL Intravenous PRN Sumit Peopels MD        sodium chloride 0.9 % flush 10 mL  10 mL Intravenous Q12H Josephine Camarillo PA-C   10 mL at 03/04/25 0914    sodium chloride 0.9 % flush 10 mL  10 mL Intravenous Josephine De Leon PA-C        sodium chloride 0.9 % infusion 40 mL  40 mL Intravenous Josephine De Leon PA-C            ________________________________________________________         OBJECTIVE:    PHYSICAL EXAM:    Constitutional: The patient is in no apparent distress, bright awake and alert. There is no shortness of breath.   PSYCHIATRIC: Mood/affect normal, judgement normal, appropriate  HEENT:  Normocephalic, atraumatic.   Chest: Breathing unlabored  Cardiac: Regular rate and rhythm.   Extremities:  No clubbing, cyanosis or edema.    NEUROLOGICAL:    Cognition:   Oriented to name, hospital, year   Fund of knowledge decent .  Concentration and attention normal.   Language normal with normal comprehension, fluent speech, intact repetition and naming.      Cranial nerves;    II - pupils bilaterally equal reacting to light,  No new Visual field deficits;  Fundoscopic exam- Not able to be done, non-dilated exam  III,IV,VI: EOMI with no diplopia  V: Normal facial sensations  VII: No facial asymmetry,  VIII: No New hearing abnormality  IX, X, XI: normal gag and shoulder shrug;  XII: tongue is in the midline.    Sensory:  Intact to light touch in all extremities.     Motor: Strength 5-/5 bilaterally upper and lower extremities, very mild drift on the RUE. No involuntary movements present. Normal tone and bulk.    Cerebellar: Finger to nose and mirror movements normal bilaterally.    Gait and balance: Deferred.     Physical exam performed by ESMER Ervin.     ________________________________________________________   RESULTS REVIEW:    VITAL SIGNS:   Temp:  [97.3 °F (36.3 °C)-98.4 °F (36.9 °C)] 98.4 °F (36.9 °C)  Heart Rate:  [56-74] 59  Resp:  [13-17] 14  BP: ()/(58-98) 124/68     LABS:      Lab 03/04/25  0420 03/03/25  1217   WBC 8.85 11.34*   HEMOGLOBIN 12.1 13.5   HEMATOCRIT 39.2 42.1   PLATELETS 272 296   NEUTROS ABS 3.46 6.68   IMMATURE GRANS (ABS) 0.05 0.07*   LYMPHS ABS 4.59* 3.75*   MONOS ABS 0.49 0.79   EOS ABS 0.21 0.02   MCV 93.8 92.1         Lab 03/04/25  0420 03/03/25  1217   SODIUM 143 141   POTASSIUM 4.1 3.5   CHLORIDE 108* 106   CO2 24.7 24.5   ANION GAP 10.3 10.5   BUN  17 20   CREATININE 0.90 0.87   EGFR 68.1 70.9   GLUCOSE 89 97   CALCIUM 8.8 9.5   MAGNESIUM 2.3  --    TSH  --  0.640         Lab 03/03/25  1217   TOTAL PROTEIN 6.9   ALBUMIN 4.5   GLOBULIN 2.4   ALT (SGPT) 14   AST (SGOT) 18   BILIRUBIN 0.3   ALK PHOS 86                     UA          4/23/2024    08:58 8/6/2024    10:46 3/3/2025    13:39   Urinalysis   Specific [place], UA   1.048    Ketones, UA Negative  Negative  Negative    Blood, UA   Negative    Leukocytes, UA Trace  Large (3+)  Negative    Nitrite, UA   Negative        Lab Results   Component Value Date    TSH 0.640 03/03/2025     (H) 02/07/2025    HGBA1C 6.1 (A) 02/17/2025    GHTNGCXY12 325 12/28/2021       IMAGING STUDIES:  MRI Brain Without Contrast    Result Date: 3/3/2025  Impression: 1. No acute intracranial finding. No MR evidence of acute or subacute ischemic insult. 2. Features of mild chronic microvascular disease appear similar to the 1/10/2022 MRI brain. Electronically Signed: Yaa Ralph MD  3/3/2025 4:07 PM EST  Workstation ID: [id number]    CT Head Without Contrast    Result Date: 3/3/2025  1.No acute intracranial abnormality is identified. 2.Findings compatible with chronic microvascular ischemic change and diffuse cortical atrophy. 3.No acute abnormality is identified within the large arteries of the head or neck. 4.No significant stenosis of the bilateral internal carotid arteries. Electronically Signed: Arnold Clifton MD  3/3/2025 1:57 PM EST  Workstation ID: [id number]    CT Angiogram Head    Result Date: 3/3/2025  1.No acute intracranial abnormality is identified. 2.Findings compatible with chronic microvascular ischemic change and diffuse cortical atrophy. 3.No acute abnormality is identified within the large arteries of the head or neck. 4.No significant stenosis of the bilateral internal carotid arteries. Electronically Signed: Arnold Clifton MD  3/3/2025 1:57 PM EST  Workstation ID: [id number]    CT Angiogram Neck    Result  Date: 3/3/2025  1.No acute intracranial abnormality is identified. 2.Findings compatible with chronic microvascular ischemic change and diffuse cortical atrophy. 3.No acute abnormality is identified within the large arteries of the head or neck. 4.No significant stenosis of the bilateral internal carotid arteries. Electronically Signed: Arnold Clifton MD  3/3/2025 1:57 PM EST  Workstation ID: [id number]    XR Chest 1 View    Result Date: 3/3/2025  Impression: No acute cardiopulmonary abnormality is identified. Electronically Signed: Merry Conner  3/3/2025 12:37 PM EST  Workstation ID: [id number]     I reviewed the patient's new clinical results.    ________________________________________________________     PROBLEM LIST:    Stroke-like symptoms            ASSESSMENT/PLAN:    Found down without LOC,  dysarthria, slight right side weakness  Possible TIA  Hx of Lewy Body dementia     MRI brain: reviewed- no acute/subacute stroke   CTA head and neck: No significant stenosis of the head or neck.  Echo: EF is 56 to 60%, negative saline test  EKG: Sinus rhythm, rate 66  Labs: A1C: P, B12: P, LDL:  156, TSH: 0.640  Antithrombotics: ASA allergy- start Plavix 75 mg daily   Statin: Lipitor 40  - PT/OT/ST as appropriate, Neuro checks per protocol, DVT prophylaxis, Stroke education    Plan  Start Plavix 75 mg daily and Lipitor 40  Follow up with stroke clinic in 1 month     There are no further recommendations. Will sign off, please call with any questions or concerns.      I discussed the patient's findings and my recommendations with patient, family, and nursing staff    JAMES Gross  03/04/25  13:22 EST

## 2025-03-04 NOTE — THERAPY EVALUATION
Patient Name: Genevieve Kuo  : 1955    MRN: [id number]                              Today's Date: 3/4/2025       Admit Date: 3/3/2025    Visit Dx:     ICD-10-CM ICD-9-CM   1. TIA (transient ischemic attack)  G45.9 435.9     Patient Active Problem List   Diagnosis    Mixed hyperlipidemia    Acquired hypothyroidism    Special screening for malignant neoplasms, colon    Tubular adenoma of colon    Screening for malignant neoplasm of cervix    Intervertebral lumbar disc disorder with myelopathy, lumbar region    Fibrocystic changes of right breast    Family history of malignant neoplasm of gastrointestinal tract    Medicare annual wellness visit, subsequent    Diverticulosis of large intestine without hemorrhage    Chronic seasonal allergic rhinitis due to pollen    Asymptomatic menopausal state    Arthralgia of multiple sites    Anxiety and depression    Cervical disc disease    Encounter for screening mammogram for malignant neoplasm of breast    Class 1 obesity due to excess calories with serious comorbidity and body mass index (BMI) of 31.0 to 31.9 in adult    Family history of dementia    Type 2 diabetes mellitus without complication, without long-term current use of insulin    [place] of right macula    CAMILA (obstructive sleep apnea)    Mild Lewy body dementia    Mixed stress and urge urinary incontinence    Primary insomnia    Stroke-like symptoms     Past Medical History:   Diagnosis Date    Allergic     Anxiety     Arthritis     Asthma     Breast cyst, right 1999    cyst removed    Hyperlipidemia     Hypothyroidism     Low back pain      Past Surgical History:   Procedure Laterality Date    BREAST BIOPSY Right     core and sx    BREAST CYST EXCISION Right     CHOLECYSTECTOMY      TOTAL ABDOMINAL HYSTERECTOMY WITH SALPINGO OOPHORECTOMY      heavy bleeding      General Information       Row Name 25 1222          OT Time and Intention    Document Type evaluation  -DT     Mode of Treatment occupational  "therapy  -DT     Patient Effort good  -DT     Symptoms Noted During/After Treatment significant change in vital signs  -DT     Comment orthostatic hypotensive, c/o light headedness.  -DT       Row Name 03/04/25 1222          General Information    Patient Profile Reviewed yes  -DT     Prior Level of Function independent:;all household mobility;ADL's  uses SPC in community. Admits to \"furniture walking\" in home. Has walk-in shower with seat. Owns rollator & w/c, but has not been using either. Does own med mgmt with spouse checking it for accuracy.  -DT     Existing Precautions/Restrictions fall  Reports falling at least 4 times in past 3 months. States she feels dizzy prior to falling.  -DT       Row Name 03/04/25 1222          Living Environment    People in Home spouse  -DT       Row Name 03/04/25 1222          Home Main Entrance    Number of Stairs, Main Entrance other (see comments)  ramp  -DT       Row Name 03/04/25 1222          Stairs Within Home, Primary    Number of Stairs, Within Home, Primary none  -DT       Row Name 03/04/25 1222          Cognition    Orientation Status (Cognition) oriented x 4;other (see comments)  SBT performed with pt scoring 4/28  -DT       Row Name 03/04/25 1222          Safety Issues/Impairments Affecting Functional Mobility    Impairments Affecting Function (Mobility) balance;endurance/activity tolerance;strength  -DT               User Key  (r) = Recorded By, (t) = Taken By, (c) = Cosigned By      Initials Name Provider Type    DT Jigna Geiger, OT Occupational Therapist                     Mobility/ADL's       Row Name 03/04/25 1227          Bed Mobility    Bed Mobility bed mobility (all) activities  -DT     All Activities, [place] (Bed Mobility) not tested  -DT     Comment, (Bed Mobility) Pt up in recliner.  -DT       Row Name 03/04/25 1227          Transfers    Transfers sit-stand transfer;stand-sit transfer  -DT       Row Name 03/04/25 1227          Sit-Stand " Transfer    Sit-Stand [place] (Transfers) contact guard  -DT       Row Name 03/04/25 1227          Stand-Sit Transfer    Stand-Sit [place] (Transfers) contact guard  -DT       Row Name 03/04/25 1227          Activities of Daily Living    BADL Assessment/Intervention lower body dressing;grooming;feeding  -DT       Row Name 03/04/25 1227          Lower Body Dressing Assessment/Training    [place] Level (Lower Body Dressing) lower body dressing skills;contact guard assist  -DT       Row Name 03/04/25 1227          Grooming Assessment/Training    [place] Level (Grooming) grooming skills;independent  -DT       Row Name 03/04/25 1227          Self-Feeding Assessment/Training    [place] Level (Feeding) independent  -DT               User Key  (r) = Recorded By, (t) = Taken By, (c) = Cosigned By      Initials Name Provider Type    DT Jigna Geiger, OT Occupational Therapist                   Obj/Interventions       Row Name 03/04/25 1228          Sensory Assessment (Somatosensory)    Sensory Assessment (Somatosensory) sensation intact  -DT       Row Name 03/04/25 1228          Vision Assessment/Intervention    Visual Impairment/Limitations WFL  -DT       Row Name 03/04/25 1228          Range of Motion Comprehensive    General Range of Motion bilateral upper extremity ROM WFL  -DT       Row Name 03/04/25 1228          Strength Comprehensive (MMT)    General Manual Muscle Testing (MMT) Assessment upper extremity strength deficits identified  -DT     Comment, General Manual Muscle Testing (MMT) Assessment RUE = 4-/5, LUE = 4/5  Pt is right hand dominant.  -DT       Row Name 03/04/25 1228          Motor Skills    Motor Skills coordination;functional endurance  -DT     Coordination gross motor deficit;right;upper extremity;minimal impairment  -DT     Functional Endurance fair.  Pt on 3L of O2 & O2 sat ranging from 89-92.  Pt does not use O2 at baseline.  -DT       Row Name 03/04/25 1228           Balance    Balance Assessment sitting static balance;sitting dynamic balance;standing static balance;standing dynamic balance  -DT     Static Sitting Balance independent;supervision  -DT     Position, Sitting Balance sitting in chair  -DT     Static Standing Balance contact guard  -DT     Dynamic Standing Balance minimal assist  -DT     Position/Device Used, Standing Balance unsupported  -DT     Comment, Balance Pt would benefit from use of walker  -DT               User Key  (r) = Recorded By, (t) = Taken By, (c) = Cosigned By      Initials Name Provider Type    DT Jigna Geiger, OT Occupational Therapist                   Goals/Plan       Row Name 03/04/25 1256          Transfer Goal 1 (OT)    [place] Level/Cues Needed (Transfer Goal 1, OT) modified independence  -DT     Time Frame (Transfer Goal 1, OT) 2 weeks;long term goal (LTG)  -DT       Row Name 03/04/25 1256          Dressing Goal 1 (OT)    Activity/Device (Dressing Goal 1, OT) lower body dressing  -DT     [place]/Cues Needed (Dressing Goal 1, OT) modified independence  -DT     Time Frame (Dressing Goal 1, OT) 2 weeks;long term goal (LTG)  -DT       Row Name 03/04/25 1256          Toileting Goal 1 (OT)    Activity/Device (Toileting Goal 1, OT) toileting skills, all  -DT     [place] Level/Cues Needed (Toileting Goal 1, OT) modified independence  -DT     Time Frame (Toileting Goal 1, OT) long term goal (LTG);2 weeks  -DT       Row Name 03/04/25 1256          Problem Specific Goal 1 (OT)    Problem Specific Goal 1 (OT) Inc standing endurance to 5-7 min & balance to mod ind to perform sink side or room ADL tasks.  -DT     Time Frame (Problem Specific Goal 1, OT) long term goal (LTG);2 weeks  -DT       Row Name 03/04/25 1256          Therapy Assessment/Plan (OT)    Planned Therapy Interventions (OT) activity tolerance training;BADL retraining;functional balance retraining;neuromuscular control/coordination  "retraining;occupation/activity based interventions;patient/caregiver education/training;ROM/therapeutic exercise;strengthening exercise;transfer/mobility retraining  -DT               User Key  (r) = Recorded By, (t) = Taken By, (c) = Cosigned By      Initials Name Provider Type    DT Jigna Geiger, OT Occupational Therapist                   Clinical Impression       Row Name 03/04/25 1231          Pain Assessment    Pretreatment Pain Rating 4/10  -DT     Posttreatment Pain Rating 4/10  -DT     Pain Location head  -DT     Response to Pain Interventions activity participation with tolerable pain  -DT       Row Name 03/04/25 1231          Plan of Care Review    Plan of Care Reviewed With patient;daughter  -DT     Outcome Evaluation Pt is a 70 y.o. female with a CMH of Lewy Body dementia, hypothyroidism, HLD, asthma, arthritis, T2DM, anxiety who presented to [facility] on 3/3/[facility] with abnormal speech, s/p fall at home. CT of head, CTA H/N & MRI all (-). At baseline, pt lives with spouse in [facility] with ramped entry. She is ind with BADLs & IADLs except she does not cook as she states she can't sequence & recall putting in ingredients. She use a SPC in community & admits to \"furniture walking\" in home. She states she has a rollator & w/c, but doesn't use them. She has a walk-in shower with seat. Upon eval, pt is A&O X4. She scored 4/2 on SBT with \"normal cognition\" result. She demo RUE drift with minimally deficits in GMC, gross strength & dec supination AROM. She demo good vision, sensation & perception. She has minimally dec standing dynamic balance & also showed s/s of symptomatic orthostatic hypotension from sit>stand. She is able to complete BADLs in sitting position. OT will continue to follow for treatment & currently recommends home with family & outpt OT upon discharge for UE strengthening, coordination, balance & activity tolerance.  -DT       Row Name 03/04/25 1231          Therapy " Assessment/Plan (OT)    Rehab Potential (OT) good  -DT     Criteria for Skilled Therapeutic Interventions Met (OT) yes;skilled treatment is necessary;meets criteria  -DT     Therapy Frequency (OT) 5 times/wk  -DT     Predicted Duration of Therapy Intervention (OT) until d/c  -DT       Row Name 03/04/25 1231          Therapy Plan Review/Discharge Plan (OT)    Anticipated Discharge Disposition (OT) home with assist;home with outpatient therapy services  -DT       Row Name 03/04/25 1231          Vital Signs    Pre Systolic BP Rehab 120  -DT     Pre Treatment Diastolic BP 83  -DT     Intra Systolic BP Rehab 89  -DT     Intra Treatment Diastolic BP 62  -DT     Post Systolic BP Rehab 124  -DT     Post Treatment Diastolic BP 68  -DT     Pretreatment Heart Rate (beats/min) 63  -DT     Pretreatment Resp Rate (breaths/min) 13  -DT     Pre SpO2 (%) 92  -DT     O2 Delivery Pre Treatment supplemental O2  3L  -DT     Intra SpO2 (%) 89  -DT     O2 Delivery Intra Treatment supplemental O2  -DT     Post SpO2 (%) 91  -DT     O2 Delivery Post Treatment supplemental O2  -DT       Row Name 03/04/25 1231          Positioning and Restraints    Pre-Treatment Position sitting in chair/recliner  -DT     Post Treatment Position chair  -DT     In Chair notified nsg;sitting;call light within reach;encouraged to call for assist;exit alarm on;with family/caregiver;legs elevated  -DT               User Key  (r) = Recorded By, (t) = Taken By, (c) = Cosigned By      Initials Name Provider Type    DT Jigna Geiger, OT Occupational Therapist                   Outcome Measures       Row Name 03/04/25 1259          Modified [place] Scale    Pre-Stroke Modified [place] Scale 0 - No Symptoms at all.  -DT     Modified [place] Scale 4 - Moderately severe disability.  Unable to walk without assistance, and unable to attend to own bodily needs without assistance.  -DT       Row Name 03/04/25 1259          Functional Assessment    Outcome Measure  Options Modified Karie  -DT               User Key  (r) = Recorded By, (t) = Taken By, (c) = Cosigned By      Initials Name Provider Type    Jigna [place], OT Occupational Therapist                    Occupational Therapy Education       Title: PT OT SLP Therapies (In Progress)       Topic: Occupational Therapy (In Progress)       Point: ADL training (Done)       Description:   Instruct learner(s) on proper safety adaptation and remediation techniques during self care or transfers.   Instruct in proper use of assistive devices.                  Learning Progress Summary            Patient Acceptance, E,TB, VU by DT at 3/4/2025 1259    Comment: Role of OT, goals & POC, stroke education, safety awareness.   Family Acceptance, E,TB, VU by DT at 3/4/2025 1259    Comment: Role of OT, goals & POC, stroke education, safety awareness.                      Point: Home exercise program (Not Started)       Description:   Instruct learner(s) on appropriate technique for monitoring, assisting and/or progressing therapeutic exercises/activities.                  Learner Progress:  Not documented in this visit.              Point: Precautions (Done)       Description:   Instruct learner(s) on prescribed precautions during self-care and functional transfers.                  Learning Progress Summary            Patient Acceptance, E,TB, VU by DT at 3/4/2025 1259    Comment: Role of OT, goals & POC, stroke education, safety awareness.   Family Acceptance, E,TB, VU by DT at 3/4/2025 1259    Comment: Role of OT, goals & POC, stroke education, safety awareness.                      Point: Body mechanics (Done)       Description:   Instruct learner(s) on proper positioning and spine alignment during self-care, functional mobility activities and/or exercises.                  Learning Progress Summary            Patient Acceptance, E,TB, VU by DT at 3/4/2025 1259    Comment: Role of OT, goals & POC, stroke education, safety  "awareness.   Family Acceptance, E,TB, VU by DT at 3/4/2025 8109    Comment: Role of OT, goals & POC, stroke education, safety awareness.                                      User Key       Initials Effective Dates Name Provider Type Discipline    DT 07/11/23 -  Jigna Geiger, OT Occupational Therapist OT                  OT Recommendation and Plan  Planned Therapy Interventions (OT): activity tolerance training, BADL retraining, functional balance retraining, neuromuscular control/coordination retraining, occupation/activity based interventions, patient/caregiver education/training, ROM/therapeutic exercise, strengthening exercise, transfer/mobility retraining  Therapy Frequency (OT): 5 times/wk  Plan of Care Review  Plan of Care Reviewed With: patient, daughter  Outcome Evaluation: Pt is a 70 y.o. female with a CMH of Lewy Body dementia, hypothyroidism, HLD, asthma, arthritis, T2DM, anxiety who [facility] with abnormal speech, s/p fall at home. CT of head, CTA H/N & MRI all (-). At baseline, pt lives with spouse in [facility] with ramped entry. She is ind with BADLs & IADLs except she does not cook as she states she can't sequence & recall putting in ingredients. She use a SPC in community & admits to \"furniture walking\" in home. She states she has a rollator & w/c, but doesn't use them. She has a walk-in shower with seat. Upon eval, pt is A&O X4. She scored 4/2 on SBT with \"normal cognition\" result. She demo RUE drift with minimally deficits in GMC, gross strength & dec supination AROM. She demo good vision, sensation & perception. She has minimally dec standing dynamic balance & also showed s/s of symptomatic orthostatic hypotension from sit>stand. She is able to complete BADLs in sitting position. OT will continue to follow for treatment & currently recommends home with family & outpt OT upon discharge for UE strengthening, coordination, balance & activity tolerance.     " Time Calculation:         Time Calculation- OT       Row Name 03/04/25 1300             Time Calculation- OT    OT Start Time 1115  -DT      OT Stop Time 1147  -DT      OT Time Calculation (min) 32 min  -DT      OT Received On 03/04/25  -DT      OT - Next Appointment 03/05/25  -DT      OT Goal Re-Cert Due Date 03/18/25  -DT                User Key  (r) = Recorded By, (t) = Taken By, (c) = Cosigned By      Initials Name Provider Type    Jigna [place], OT Occupational Therapist                           Jigna Geiger, OT  3/4/2025

## 2025-03-04 NOTE — PLAN OF CARE
Goal Outcome Evaluation:           Progress: improving     Patient resting abed, family at bedside, alert and oriented, no complaints of pain, no change in neuro status, started oxygen at 2L r/t oxygen saturation dropping to 85% while asleep, questions and concerns answered.      Problem: Adult Inpatient Plan of Care  Goal: Plan of Care Review  Outcome: Progressing  Flowsheets (Taken 3/4/2025 0444)  Progress: improving  Goal: Patient-Specific Goal (Individualized)  Outcome: Progressing  Goal: Absence of Hospital-Acquired Illness or Injury  Outcome: Progressing  Intervention: Identify and Manage Fall Risk  Recent Flowsheet Documentation  Taken 3/4/2025 0400 by Maria L Blas RN  Safety Promotion/Fall Prevention: safety round/check completed  Taken 3/4/2025 0200 by Maria L Blas RN  Safety Promotion/Fall Prevention: safety round/check completed  Taken 3/4/2025 0020 by Maria L Blas RN  Safety Promotion/Fall Prevention: safety round/check completed  Taken 3/3/2025 2200 by Maria L Blas RN  Safety Promotion/Fall Prevention: safety round/check completed  Taken 3/3/2025 2000 by Maria L Blas RN  Safety Promotion/Fall Prevention:   activity supervised   fall prevention program maintained   mobility aid in reach   nonskid shoes/slippers when out of bed   safety round/check completed  Intervention: Prevent and Manage VTE (Venous Thromboembolism) Risk  Recent Flowsheet Documentation  Taken 3/3/2025 2000 by Maria L Blas RN  VTE Prevention/Management: patient refused intervention  Intervention: Prevent Infection  Recent Flowsheet Documentation  Taken 3/3/2025 2000 by Maria L Blas RN  Infection Prevention: personal protective equipment utilized  Goal: Optimal Comfort and Wellbeing  Outcome: Progressing  Intervention: Provide Person-Centered Care  Recent Flowsheet Documentation  Taken 3/3/2025 2000 by Maria L Blas RN  Trust Relationship/Rapport:   care explained   questions encouraged   questions answered    reassurance provided  Goal: Readiness for Transition of Care  Outcome: Progressing     Problem: Skin Injury Risk Increased  Goal: Skin Health and Integrity  Outcome: Progressing  Intervention: Optimize Skin Protection  Recent Flowsheet Documentation  Taken 3/3/2025 2000 by Maria L Blas RN  Pressure Reduction Devices: pressure-redistributing mattress utilized     Problem: Comorbidity Management  Goal: Blood Glucose Level Within Target Range  Outcome: Progressing  Intervention: Monitor and Manage Glycemia  Recent Flowsheet Documentation  Taken 3/3/2025 2000 by Maria L Blas RN  Medication Review/Management: medications reviewed  Goal: Blood Pressure in Desired Range  Outcome: Progressing  Intervention: Maintain Blood Pressure Management  Recent Flowsheet Documentation  Taken 3/3/2025 2000 by Maria L Blas RN  Medication Review/Management: medications reviewed     Problem: Stroke, Ischemic (Includes Transient Ischemic Attack)  Goal: Optimal Coping  Outcome: Progressing  Goal: Optimal Cognitive Function  Outcome: Progressing  Goal: Improved Communication Skills  Outcome: Progressing  Goal: Optimal Functional Ability  Outcome: Progressing

## 2025-03-04 NOTE — THERAPY EVALUATION
Patient Name: Genevieve Kuo  : 1955    MRN: [id number]                              Today's Date: 3/4/2025       Admit Date: 3/3/2025    Visit Dx:     ICD-10-CM ICD-9-CM   1. TIA (transient ischemic attack)  G45.9 435.9   2. Chronic obstructive pulmonary disease, unspecified COPD type  J44.9 496   3. CAMILA (obstructive sleep apnea)  G47.33 327.23   4. Acquired hypothyroidism  E03.9 244.9     Patient Active Problem List   Diagnosis    Mixed hyperlipidemia    Acquired hypothyroidism    Special screening for malignant neoplasms, colon    Tubular adenoma of colon    Screening for malignant neoplasm of cervix    Intervertebral lumbar disc disorder with myelopathy, lumbar region    Fibrocystic changes of right breast    Family history of malignant neoplasm of gastrointestinal tract    Medicare annual wellness visit, subsequent    Diverticulosis of large intestine without hemorrhage    Chronic seasonal allergic rhinitis due to pollen    Asymptomatic menopausal state    Arthralgia of multiple sites    Anxiety and depression    Cervical disc disease    Encounter for screening mammogram for malignant neoplasm of breast    Class 1 obesity due to excess calories with serious comorbidity and body mass index (BMI) of 31.0 to 31.9 in adult    Family history of dementia    Type 2 diabetes mellitus without complication, without long-term current use of insulin    Drusen of right macula    CAMILA (obstructive sleep apnea)    Mild Lewy body dementia    Mixed stress and urge urinary incontinence    Primary insomnia    Stroke-like symptoms     Past Medical History:   Diagnosis Date    Allergic     Anxiety     Arthritis     Asthma     Breast cyst, right 1999    cyst removed    Hyperlipidemia     Hypothyroidism     Low back pain      Past Surgical History:   Procedure Laterality Date    BREAST BIOPSY Right     core and sx    BREAST CYST EXCISION Right     CHOLECYSTECTOMY      TOTAL ABDOMINAL HYSTERECTOMY WITH SALPINGO OOPHORECTOMY       heavy bleeding      General Information       [facility] Name 03/04/25 1505          Physical Therapy Time and Intention    Document Type evaluation  -     Mode of Treatment physical therapy  -[facility] Name 03/04/25 1505          General Information    Patient Profile Reviewed yes  -     Prior Level of Function independent:;all household mobility;ADL's  -     Existing Precautions/Restrictions fall  -[facility] Name 03/04/25 1505          Living Environment    People in Home spouse  -[facility] Name 03/04/25 1505          Home Main Entrance    Number of Stairs, Main Entrance none;other (see comments)  ramp entry  -[facility] Name 03/04/25 1505          Stairs Within Home, Primary    Number of Stairs, Within Home, Primary none  -[facility] Name 03/04/25 1505          Cognition    Orientation Status (Cognition) oriented x 4  -[facility] Name 03/04/25 1505          Safety Issues/Impairments Affecting Functional Mobility    Impairments Affecting Function (Mobility) balance;endurance/activity tolerance;strength  -               User Key  (r) = Recorded By, (t) = Taken By, (c) = Cosigned By      Initials Name Provider Type     Renetta Barr, PT Physical Therapist                   Mobility       [facility] Name 03/04/25 1507          Bed Mobility    Bed Mobility bed mobility (all) activities  -     All Activities, Garvin (Bed Mobility) supervision  -[facility] Name 03/04/25 1507          Sit-Stand Transfer    Sit-Stand Garvin (Transfers) standby assist  -[facility] Name 03/04/25 1507          Gait/Stairs (Locomotion)    Garvin Level (Gait) contact guard  -     Assistive Device (Gait) walker, front-wheeled  -     Patient was able to Ambulate yes  -     Distance in Feet (Gait) 100  -     Deviations/Abnormal Patterns (Gait) gait speed decreased;stride length decreased  -               User Key  (r) = Recorded By, (t) = Taken By, (c) = Cosigned By      Initials Name  Provider Type     Renetta Barr PT Physical Therapist                   Obj/Interventions       Row Name 03/04/25 1517          Range of Motion Comprehensive    General Range of Motion no range of motion deficits identified  -[facility] Name 03/04/25 1517          Strength Comprehensive (MMT)    General Manual Muscle Testing (MMT) Assessment lower extremity strength deficits identified  -     Comment, General Manual Muscle Testing (MMT) Assessment BLE 3+/5 hips, 4-/5 knee and ankle  -       Row Name 03/04/25 1517          Balance    Balance Assessment sitting static balance;sitting dynamic balance;standing static balance;standing dynamic balance  -     Static Sitting Balance independent  -     Dynamic Sitting Balance contact guard  -     Position, Sitting Balance unsupported;sitting edge of bed  -     Static Standing Balance standby assist  -     Dynamic Standing Balance contact guard  -     Position/Device Used, Standing Balance supported;walker, front-wheeled  -       Row Name 03/04/25 1517          Sensory Assessment (Somatosensory)    Sensory Assessment (Somatosensory) sensation intact  -               User Key  (r) = Recorded By, (t) = Taken By, (c) = Cosigned By      Initials Name Provider Type     Renetta Barr, CELESTINO Physical Therapist                   Goals/Plan    No documentation.                  Clinical Impression       [facility] Name 03/04/25 1518          Pain    Pretreatment Pain Rating 5/10  -     Posttreatment Pain Rating 5/10  -     Pain Location head  -     Pain Side/Orientation posterior  -[facility] Name 03/04/25 1518          Plan of Care Review    Plan of Care Reviewed With patient;family  -     Outcome Evaluation Genevieve Kuo is a 70 y.o. F with a CMH of Lewy Body dementia, hypothyroidism, HLD, asthma, arthritis, T2DM, anxiety who presented to [facility] on 3/3/2025 with abnormal speech. Pt spouse found her on the closet floor and noticed her  "speech was more altered than normal and she seemed confused. CTH and CTA H/N did not show any acute findings. MRI (-). Pt is A&Ox4 and reports typically IND with ADLs and mobility with SPC. Pt also has a rollator at home but doesn't use it, and a shower chair. Pt lives with spouse in a [facility] with ramp entry. Today pt is SUP for bed mobility, at EOB pt began to feel \"woozy\" and fell toward R side, min assist to find midline progressing to SBA. Pt is SBA to stand from EOB and CGA for ambulation ~100ft with RW, slight LOB at initiation of gait with good righting reactions. Pt BP as follows throughout session 114/62 sitting EOB, 100/57 standing, 106/65 after activity. Pt had no reports of dizziness in standing but did at EOB. Pt educated to wait several minutes between position changes to let dizziness subside before progressing mobility. Pt encouraged to use rollator at home to reduce fall risk. Pt appears to be near baseline but would benefit from OP PT to address balance deficits. PT will sign off.  -       Row Name 03/04/25 1518          Therapy Assessment/Plan (PT)    Criteria for Skilled Interventions Met (PT) yes  -     Therapy Frequency (PT) evaluation only  -       Row Name 03/04/25 1518          Vital Signs    Pre Systolic BP Rehab 114  -AH     Pre Treatment Diastolic BP 62  -AH     Intra Systolic BP Rehab 100  -AH     Intra Treatment Diastolic BP 57  -AH     Post Systolic BP Rehab 106  -AH     Post Treatment Diastolic BP 65  -AH     Pretreatment Heart Rate (beats/min) 57  -AH     Pre SpO2 (%) 95  -AH     O2 Delivery Pre Treatment nasal cannula  -AH     O2 Delivery Intra Treatment nasal cannula  -AH     Post SpO2 (%) 92  -AH     O2 Delivery Post Treatment nasal cannula  -AH     Pre Patient Position Sitting  -AH     Intra Patient Position Standing  -AH     Post Patient Position Sitting  -       Row Name 03/04/25 1518          Positioning and Restraints    Pre-Treatment Position in bed  -AH     Post " Treatment Position bed  -     In Bed notified nsg;fowlers;call light within reach;encouraged to call for assist;with other staff;exit alarm on  -               User Key  (r) = Recorded By, (t) = Taken By, (c) = Cosigned By      Initials Name Provider Type     Renetta Barr, PT Physical Therapist                   Outcome Measures       Row Name 03/04/25 0800          How much help from another person do you currently need...    Turning from your back to your side while in flat bed without using bedrails? 3  -NH     Moving from lying on back to sitting on the side of a flat bed without bedrails? 3  -NH     Moving to and from a bed to a chair (including a wheelchair)? 3  -NH     Standing up from a chair using your arms (e.g., wheelchair, bedside chair)? 3  -NH     Climbing 3-5 steps with a railing? 3  -NH     To walk in hospital room? 3  -NH     AM-PAC 6 Clicks Score (PT) 18  -NH       Row Name 03/04/25 1259          Modified Karie Scale    Pre-Stroke Modified [place] Scale 0 - No Symptoms at all.  -DT     Modified [place] Scale 4 - Moderately severe disability.  Unable to walk without assistance, and unable to attend to own bodily needs without assistance.  -DT       Row Name 03/04/25 1259          Functional Assessment    Outcome Measure Options Modified Karie  -DT               User Key  (r) = Recorded By, (t) = Taken By, (c) = Cosigned By      Initials Name Provider Type     Jigna Geiger, OT Occupational Therapist    NH Adela Anguiano, RN Registered Nurse                                 Physical Therapy Education       Title: PT OT SLP Therapies (Done)       Topic: Physical Therapy (Done)       Point: Mobility training (Done)       Learning Progress Summary            Patient Acceptance, E, VU by  at 3/4/2025 1539                      Point: Body mechanics (Done)       Learning Progress Summary            Patient Acceptance, E, VU by  at 3/4/2025 5409                      Point:  "Precautions (Done)       Learning Progress Summary            Patient Acceptance, E, VU by  at 3/4/2025 1530                                      User Key       Initials Effective Dates Name Provider Type [facility] 08/12/24 -  Renetta Barr, PT Physical Therapist PT                  PT Recommendation and Plan     Outcome Evaluation: Genevieve Kuo is a 70 y.o. F with a CMH of Lewy Body dementia, hypothyroidism, HLD, asthma, arthritis, T2DM, anxiety who presented to [facility] on 3/3/2025 with abnormal speech. Pt spouse found her on the closet floor and noticed her speech was more altered than normal and she seemed confused. CTH and CTA H/N did not show any acute findings. MRI (-). Pt is A&Ox4 and reports typically IND with ADLs and mobility with SPC. Pt also has a rollator at home but doesn't use it, and a shower chair. Pt lives with spouse in a [facility] with ramp entry. Today pt is SUP for bed mobility, at EOB pt began to feel \"woozy\" and fell toward R side, min assist to find midline progressing to SBA. Pt is SBA to stand from EOB and CGA for ambulation ~100ft with RW, slight LOB at initiation of gait with good righting reactions. Pt BP as follows throughout session 114/62 sitting EOB, 100/57 standing, 106/65 after activity. Pt had no reports of dizziness in standing but did at EOB. Pt educated to wait several minutes between position changes to let dizziness subside before progressing mobility. Pt encouraged to use rollator at home to reduce fall risk. Pt appears to be near baseline but would benefit from OP PT to address balance deficits. PT will sign off.     Time Calculation:         PT Charges       Row Name 03/04/25 1539             Time Calculation    Start Time 0955  -      Stop Time 1013  -      Time Calculation (min) 18 min  -      PT Received On 03/04/25  -                User Key  (r) = Recorded By, (t) = Taken By, (c) = Cosigned By      Initials Name Provider Type     Momo, " Renetta Khan, PT Physical Therapist                  Therapy Charges for Today       Code Description Service Date Service Provider Modifiers Qty    47650813752 HC PT EVAL MOD COMPLEXITY 4 3/4/2025 Renetta Barr, PT GP 1            PT G-Codes  Outcome Measure Options: Modified [place]  AM-PAC 6 Clicks Score (PT): 18  Modified [place] Scale: 4 - Moderately severe disability.  Unable to walk without assistance, and unable to attend to own bodily needs without assistance.  PT Discharge Summary  Anticipated Discharge Disposition (PT): home with outpatient therapy services, home with assist    Renetta Barr, PT  3/4/2025

## 2025-03-04 NOTE — PLAN OF CARE
Problem: Adult Inpatient Plan of Care  Goal: Plan of Care Review  Outcome: Met  Goal: Patient-Specific Goal (Individualized)  Outcome: Met  Goal: Absence of Hospital-Acquired Illness or Injury  Outcome: Met  Intervention: Identify and Manage Fall Risk  Recent Flowsheet Documentation  Taken 3/4/2025 1200 by Adela Anguiano RN  Safety Promotion/Fall Prevention:   activity supervised   assistive device/personal items within reach   clutter free environment maintained   fall prevention program maintained   nonskid shoes/slippers when out of bed   room organization consistent   safety round/check completed  Taken 3/4/2025 1000 by Adela Anguiano RN  Safety Promotion/Fall Prevention:   activity supervised   assistive device/personal items within reach   clutter free environment maintained   fall prevention program maintained   nonskid shoes/slippers when out of bed   room organization consistent   safety round/check completed  Taken 3/4/2025 0800 by Adela Anguiano RN  Safety Promotion/Fall Prevention:   activity supervised   assistive device/personal items within reach   clutter free environment maintained   fall prevention program maintained   nonskid shoes/slippers when out of bed   room organization consistent   safety round/check completed  Intervention: Prevent Skin Injury  Recent Flowsheet Documentation  Taken 3/4/2025 0800 by Adela Anguiano RN  Body Position: sitting up in bed  Intervention: Prevent and Manage VTE (Venous Thromboembolism) Risk  Recent Flowsheet Documentation  Taken 3/4/2025 0800 by Adela Anguiano RN  VTE Prevention/Management:   patient refused intervention   SCDs (sequential compression devices) off  Intervention: Prevent Infection  Recent Flowsheet Documentation  Taken 3/4/2025 1200 by Adela Anguiano RN  Infection Prevention:   environmental surveillance performed   equipment surfaces disinfected   hand hygiene promoted   rest/sleep promoted   single patient room provided  Taken  3/4/2025 1000 by Adela Anguiano RN  Infection Prevention:   environmental surveillance performed   equipment surfaces disinfected   hand hygiene promoted   rest/sleep promoted   single patient room provided  Taken 3/4/2025 0800 by Adela Anguiano RN  Infection Prevention:   environmental surveillance performed   equipment surfaces disinfected   hand hygiene promoted   rest/sleep promoted   single patient room provided  Goal: Optimal Comfort and Wellbeing  Outcome: Met  Intervention: Provide Person-Centered Care  Recent Flowsheet Documentation  Taken 3/4/2025 0800 by Adela Anguiano RN  Trust Relationship/Rapport: care explained  Goal: Readiness for Transition of Care  Outcome: Met     Problem: Skin Injury Risk Increased  Goal: Skin Health and Integrity  Outcome: Met  Intervention: Optimize Skin Protection  Recent Flowsheet Documentation  Taken 3/4/2025 0800 by Adela Anguiano RN  Pressure Reduction Techniques: frequent weight shift encouraged  Pressure Reduction Devices: pressure-redistributing mattress utilized     Problem: Comorbidity Management  Goal: Blood Glucose Level Within Target Range  Outcome: Met  Intervention: Monitor and Manage Glycemia  Recent Flowsheet Documentation  Taken 3/4/2025 1200 by Adela Anguiano RN  Medication Review/Management: medications reviewed  Taken 3/4/2025 1000 by Adela Anguiano RN  Medication Review/Management: medications reviewed  Taken 3/4/2025 0800 by Adela Anguiano RN  Medication Review/Management: medications reviewed  Goal: Blood Pressure in Desired Range  Outcome: Met  Intervention: Maintain Blood Pressure Management  Recent Flowsheet Documentation  Taken 3/4/2025 1200 by Adela Anguiano RN  Medication Review/Management: medications reviewed  Taken 3/4/2025 1000 by Adela Anguiano RN  Medication Review/Management: medications reviewed  Taken 3/4/2025 0800 by Adela Anguiano RN  Medication Review/Management: medications reviewed     Problem: Stroke, Ischemic  (Includes Transient Ischemic Attack)  Goal: Optimal Coping  Outcome: Met  Goal: Optimal Cognitive Function  Outcome: Met  Goal: Improved Communication Skills  Outcome: Met  Goal: Optimal Functional Ability  Outcome: Met     Problem: Fall Injury Risk  Goal: Absence of Fall and Fall-Related Injury  Outcome: Met  Intervention: Identify and Manage Contributors  Recent Flowsheet Documentation  Taken 3/4/2025 1200 by Adela Anguiano RN  Medication Review/Management: medications reviewed  Taken 3/4/2025 1000 by Adela Anguiano RN  Medication Review/Management: medications reviewed  Taken 3/4/2025 0800 by Adela Anguiano RN  Medication Review/Management: medications reviewed  Intervention: Promote Injury-Free Environment  Recent Flowsheet Documentation  Taken 3/4/2025 1200 by Adela Anguiano RN  Safety Promotion/Fall Prevention:   activity supervised   assistive device/personal items within reach   clutter free environment maintained   fall prevention program maintained   nonskid shoes/slippers when out of bed   room organization consistent   safety round/check completed  Taken 3/4/2025 1000 by Adela Anguiano RN  Safety Promotion/Fall Prevention:   activity supervised   assistive device/personal items within reach   clutter free environment maintained   fall prevention program maintained   nonskid shoes/slippers when out of bed   room organization consistent   safety round/check completed  Taken 3/4/2025 0800 by Adela Anguiano RN  Safety Promotion/Fall Prevention:   activity supervised   assistive device/personal items within reach   clutter free environment maintained   fall prevention program maintained   nonskid shoes/slippers when out of bed   room organization consistent   safety round/check completed   Goal Outcome Evaluation:

## 2025-03-04 NOTE — OUTREACH NOTE
Prep Survey      Flowsheet Row Responses   [place] facility patient discharged from? [place]   Is LACE score < 7 ? Yes   Eligibility [facility]   Date of Admission 03/03/25   Date of Discharge 03/04/25   Discharge Disposition Home or Self Care   Discharge diagnosis Stroke like symptoms   Does the patient have one of the following disease processes/diagnoses(primary or secondary)? Stroke   Does the patient have Home health ordered? No   Is there a DME ordered? Yes   What DME was ordered? [employer] pending at time of discharge   Prep survey completed? Yes            KAREL BRENNAN - Registered Nurse

## 2025-03-04 NOTE — PLAN OF CARE
3/4/2025   OTHER   Discipline Speech-Language Pathologist   Rehab Time/Intention   Session Not Performed Unable to treat ; St consulted for SLC, MRI and CT negative for acute findings, slc not indicated. ST will sign off.

## 2025-03-04 NOTE — PROGRESS NOTES
Exercise Oximetry    Patient Name:Genevieve Kuo   MRN: [id number]   Date: 03/04/25             ROOM AIR BASELINE   SpO2% 89   Heart Rate 66   Blood Pressure      EXERCISE ON ROOM AIR SpO2% EXERCISE ON O2 @ 2 LPM SpO2%   1 MINUTE 87 1 MINUTE    2 MINUTES  2 MINUTES    placed on 2L 90%   3 MINUTES  3 MINUTES          on 2L 92%   4 MINUTES  4 MINUTES          on 2L 94%   5 MINUTES  5 MINUTES          on 2L 90%   6 MINUTES  6 MINUTES          on 2L 93%              Distance Walked  6 minutes Distance Walked   Dyspnea ([place] Scale)   Dyspnea (Ernesto Scale)   Fatigue (Ernesto Scale)   Fatigue (Ernesto Scale)   SpO2% Post Exercise  93% SpO2% Post Exercise   HR Post Exercise  80 HR Post Exercise   Time to Recovery  2 minutes Time to Recovery     Comments: Pt required 2L of oxygen to maintain SpO2 with rest and with ambulation.

## 2025-03-04 NOTE — DISCHARGE SUMMARY
"[facility] Medicine Services  Discharge Summary    Date of Service: 3/4/2025  Patient Name: Genevieve Kuo  : 1955  MRN: [id number]    Date of Admission: 3/3/2025  Discharge Diagnosis: Stroke-like symptoms, possible TIA  Date of Discharge: 3/4/2025  Primary Care Physician: Meet Rodriguez MD      Presenting Problem:   Stroke-like symptoms [R29.90]    Active and Resolved Hospital Problems:  Active Hospital Problems    Diagnosis POA    **Stroke-like symptoms [R29.90] Yes      Resolved Hospital Problems   No resolved problems to display.         Hospital Course     HPI:    \"Genevieve Kuo is a 70 y.o. female with a CMH of Lewy Body dementia, hypothyroidism, HLD, asthma, arthritis, T2DM, anxiety who presented to [facility] on 3/3/2025 with  abnormal speech.     Patient was brought to the ED after being found on the floor in her closet.  Patient has dementia therefore hx is limited.  Patient's  states that she seemed in her usual state of health last night before bed although told the ED provider that her LKN was around 2 or 3pm yesterday.  She got up this morning to go to the bathroom around 6:30 am and he noticed that she was in the bathroom for a while, went to check on her and found her lying on the floor in the closet (the closet is attached to the bathroom).  She seemed confused and was only able to get 1-2 words out at a time and speech was difficult to understand.  She has some speech issues at baseline with some slurring and difficulty with word finding but patient's  said her speech seemed worse this morning than usual.  Patient does not recall falling or the details of this but does remember getting up to use the bathroom.  There was reportedly some facial droop when the patient was being triaged but neither  of patient or ED provider noted this. Patient says she feels weak all over but denies any focal or unilateral weakness. She also denies any " "vision changes, headache, pain anywhere, SOA, palpitations, dysphagia, paresthesias, GI sx.  When asked about any recent medication changes, patient stated her dose of levothyroxine was changed. On reviewing recent PCP office note, she was prescribed prednisone and baclofen on 2/17 which neither patient or  informed me of.     In the ED, CTH and CTA H/N were obtained which did not show any acute findings. Labs were essentially unremarkable except for mild leukocytosis of 11.  UA negative. VSS. She was admitted for further management. \"    Hospital Course:  Possible TIA  Neurology recommendations are noted as below,  MRI brain: reviewed- no acute/subacute stroke   CTA head and neck: No significant stenosis of the head or neck.  Echo: EF is 56 to 60%, negative saline test  EKG: Sinus rhythm, rate 66  Labs: A1C: P, B12: P, LDL:  156, TSH: 0.640  Antithrombotics: ASA allergy- start Plavix 75 mg daily   Statin: Lipitor 40  - PT/OT/ST as appropriate, Neuro checks per protocol, DVT prophylaxis, Stroke education     Plan  Start Plavix 75 mg daily and Lipitor 40  Follow up with stroke clinic in 1 month      Generalized weakness  Fall  -PT/OT consulted  -Noted she was recently prescribed prednisone and baclofen which may be contributory. Will hold baclofen. On gabapentin as well, no recent change in dose. Will continue cautiously     Hypothyroidism  -Continue levothyroxine     T2DM  -SSI ACHS  -CCD  -Hypoglycemia protocol in place     Lewy body dementia  -Mentation is currently baseline per   -Continue donepezil  -Follows with Dr. Seipel     Asthma  -Continue Singulair       Discussed with Neurology, patient is stable for discharge today.    Patient has history of COPD/Asthma but does not use oxygen at baseline now she is requiring 2LPM oxygen. ECHO and CXR unremarkable. Ambulatory referral placed for pulmonary for PFTs and further evaluation of hypoxia/COPD.    \"Pt required 2L of oxygen to maintain SpO2 with " "rest and with ambulation. \"    DISCHARGE Follow Up Recommendations for labs and diagnostics:   Follow up with primary care provider within 3 days of this discharge.   Follow up with Pulmonary and stroke clinic.         Day of Discharge     Vital Signs:  Temp:  [97.3 °F (36.3 °C)-98.4 °F (36.9 °C)] 98.4 °F (36.9 °C)  Heart Rate:  [56-74] 59  Resp:  [13-17] 14  BP: ()/(58-94) 124/68  Flow (L/min) (Oxygen Therapy):  [2] 2          Pertinent  and/or Most Recent Results     LAB RESULTS:      Lab 03/04/25  0420 03/03/25  1217   WBC 8.85 11.34*   HEMOGLOBIN 12.1 13.5   HEMATOCRIT 39.2 42.1   PLATELETS 272 296   NEUTROS ABS 3.46 6.68   IMMATURE GRANS (ABS) 0.05 0.07*   LYMPHS ABS 4.59* 3.75*   MONOS ABS 0.49 0.79   EOS ABS 0.21 0.02   MCV 93.8 92.1         Lab 03/04/25  0420 03/03/25  1217   SODIUM 143 141   POTASSIUM 4.1 3.5   CHLORIDE 108* 106   CO2 24.7 24.5   ANION GAP 10.3 10.5   BUN 17 20   CREATININE 0.90 0.87   EGFR 68.1 70.9   GLUCOSE 89 97   CALCIUM 8.8 9.5   MAGNESIUM 2.3  --    TSH  --  0.640         Lab 03/03/25  1217   TOTAL PROTEIN 6.9   ALBUMIN 4.5   GLOBULIN 2.4   ALT (SGPT) 14   AST (SGOT) 18   BILIRUBIN 0.3   ALK PHOS 86                     Brief Urine Lab Results  (Last result in the past 365 days)        Color   Clarity   Blood   Leuk Est   Nitrite   Protein   CREAT   Urine HCG        03/03/25 1339 Yellow   Clear   Negative   Negative   Negative   Negative                 Microbiology Results (last 10 days)       ** No results found for the last 240 hours. **            MRI Brain Without Contrast    Result Date: 3/3/2025  Impression: Impression: 1. No acute intracranial finding. No MR evidence of acute or subacute ischemic insult. 2. Features of mild chronic microvascular disease appear similar to the 1/10/2022 MRI brain. Electronically Signed: Yaa Ralph MD  3/3/2025 4:07 PM EST  Workstation ID: [id number]    CT Head Without Contrast    Result Date: 3/3/2025  Impression: 1.No acute intracranial " abnormality is identified. 2.Findings compatible with chronic microvascular ischemic change and diffuse cortical atrophy. 3.No acute abnormality is identified within the large arteries of the head or neck. 4.No significant stenosis of the bilateral internal carotid arteries. Electronically Signed: Arnold Clifton MD  3/3/2025 1:57 PM EST  Workstation ID: [id number]    CT Angiogram Head    Result Date: 3/3/2025  Impression: 1.No acute intracranial abnormality is identified. 2.Findings compatible with chronic microvascular ischemic change and diffuse cortical atrophy. 3.No acute abnormality is identified within the large arteries of the head or neck. 4.No significant stenosis of the bilateral internal carotid arteries. Electronically Signed: Arnold Clifton MD  3/3/2025 1:57 PM EST  Workstation ID: [id number]    CT Angiogram Neck    Result Date: 3/3/2025  Impression: 1.No acute intracranial abnormality is identified. 2.Findings compatible with chronic microvascular ischemic change and diffuse cortical atrophy. 3.No acute abnormality is identified within the large arteries of the head or neck. 4.No significant stenosis of the bilateral internal carotid arteries. Electronically Signed: Arnold Clifton MD  3/3/2025 1:57 PM EST  Workstation ID: [id number]    XR Chest 1 View    Result Date: 3/3/2025  Impression: Impression: No acute cardiopulmonary abnormality is identified. Electronically Signed: Merry Conner  3/3/2025 12:37 PM EST  Workstation ID: [id number]             Results for orders placed during the hospital encounter of 03/03/25    Adult Transthoracic Echo Complete W/ Cont if Necessary Per Protocol    Interpretation Summary    Left ventricular systolic function is normal. Left ventricular ejection fraction appears to be 56 - 60%.    Left ventricular diastolic function was normal.    Saline test results are negative.    Estimated right ventricular systolic pressure from tricuspid regurgitation is normal (<35  mmHg).      Labs Pending at Discharge:  Pending Results       Procedure [Order ID] Specimen - Date/Time    Hemoglobin A1c [[id number]] Collected: 03/04/25 0420    Specimen: Blood from Arm, Right Updated: 03/04/25 1351    Vitamin B12 [[id number]] Collected: 03/03/25 1217    Specimen: Blood from Arm, Right Updated: 03/04/25 1345            Procedures Performed           Consults:   Consults       Date and Time Order Name Status Description    3/4/2025  1:20 PM Inpatient Neurology Consult General Completed               Discharge Details        Discharge Medications        New Medications        Instructions Start Date   atorvastatin 40 MG tablet  Commonly known as: Lipitor   40 mg, Oral, Daily      clopidogrel 75 MG tablet  Commonly known as: Plavix   75 mg, Oral, Daily             Changes to Medications        Instructions Start Date   levothyroxine 88 MCG tablet  Commonly known as: SYNTHROID, LEVOTHROID  What changed: additional instructions   88 mcg, Oral, Daily, Please, resume your home regimen.             Continue These Medications        Instructions Start Date   albuterol sulfate  (90 Base) MCG/ACT inhaler  Commonly known as: PROVENTIL HFA;VENTOLIN HFA;PROAIR HFA   2 puffs, Inhalation, Every 4 Hours PRN      cetirizine 10 MG tablet  Commonly known as: zyrTEC   10 mg, Daily      donepezil 10 MG tablet  Commonly known as: ARICEPT   10 mg, Nightly      fish oil 1000 MG capsule capsule   1 capsule, 3 Times Daily      gabapentin 300 MG capsule  Commonly known as: NEURONTIN   300 mg, Oral, 2 Times Daily      metFORMIN 500 MG tablet  Commonly known as: GLUCOPHAGE   TAKE 1 TABLET BY MOUTH EVERY DAY WITH BREAKFAST      montelukast 10 MG tablet  Commonly known as: SINGULAIR   10 mg, Oral, Daily      oxybutynin XL 15 MG 24 hr tablet  Commonly known as: DITROPAN XL   15 mg, Oral, Daily      QUEtiapine 25 MG tablet  Commonly known as: SEROquel   25 mg, Oral, Nightly             Stop These Medications      baclofen 10  MG tablet  Commonly known as: LIORESAL              Allergies   Allergen Reactions    Aspirin Swelling and Unknown - Low Severity    Ibuprofen Unknown - Low Severity         Discharge Disposition:   Home or Self Care    Diet:  Hospital:  Diet Order   Procedures    Diet: Diabetic; Consistent Carbohydrate; Fluid Consistency: Thin (IDDSI 0)         Discharge Activity:   as tolerated       CODE STATUS:  Code Status and Medical Interventions: CPR (Attempt to Resuscitate); Full Support   Ordered at: 03/03/25 1503     Code Status (Patient has no pulse and is not breathing):    CPR (Attempt to Resuscitate)     Medical Interventions (Patient has pulse or is breathing):    Full Support         Future Appointments   Date Time Provider Department Center   3/11/2025 11:00 AM [facility] PULM LAB ROOM  [place] [facility]   5/19/2025  8:30 AM Meet Rodriguez MD [facility] [place]   11/24/2025 10:30 AM Seipel, Joseph F, MD [facility] [place]       Additional Instructions for the Follow-ups that You Need to Schedule       Ambulatory Referral to Neurology   As directed      Requested service: [facility]                Time spent on Discharge including face to face service:  >35 minutes    Signature: Electronically signed by Cy Mejia MD, 03/04/25, 14:04 EST.  [facility] Hospitalist Team

## 2025-03-05 ENCOUNTER — TELEPHONE (OUTPATIENT)
Dept: FAMILY MEDICINE CLINIC | Facility: CLINIC | Age: 70
End: 2025-03-05
Payer: COMMERCIAL

## 2025-03-05 ENCOUNTER — TRANSITIONAL CARE MANAGEMENT TELEPHONE ENCOUNTER (OUTPATIENT)
Dept: CALL CENTER | Facility: HOSPITAL | Age: 70
End: 2025-03-05
Payer: COMMERCIAL

## 2025-03-05 ENCOUNTER — TELEPHONE (OUTPATIENT)
Dept: NEUROLOGY | Facility: CLINIC | Age: 70
End: 2025-03-05
Payer: COMMERCIAL

## 2025-03-05 NOTE — TELEPHONE ENCOUNTER
DUE TO BEING MEMORY AND CVA SHE WILL HAVE TO SEE DR SEIPEL ONLY. HAVE HER ON WAIT LIST WILL CALL WITH ANY CANCELLATIONS HUB OK TO RELAY

## 2025-03-05 NOTE — OUTREACH NOTE
Call Center TCM Note      Flowsheet Row Responses   [facility] facility [facility] discharged from? [facility]   Does the patient have one of the following disease processes/diagnoses(primary or secondary)? Stroke   TCM attempt successful? No   Unsuccessful attempts Attempt 2            Maria Elena Orellana MA    3/5/2025, 15:48 EST

## 2025-03-05 NOTE — TELEPHONE ENCOUNTER
Patient's daughter called to scheduled a hospital follow up. I have her scheduled for 03-17-25. While on the phone, the daughter brought up that the ER doctor took the patient off of Baclofen. She is wanting to check if they took her off of the medication because it conflicts with one of the new prescriptions or another reason. She also wanted to see if Dr. Rodriguez thinks she should continue to take it. Please advise, thank you. Daughter requested call back on her number, [phone].

## 2025-03-05 NOTE — TELEPHONE ENCOUNTER
Returned phone call and patients daughter Suyapa states that per her neuro that the baclofen is not causing symptoms, and wanted to know if there was something else that she could take.

## 2025-03-05 NOTE — TELEPHONE ENCOUNTER
Caller: MINH LANDON    Relationship to patient: Emergency Contact    Best call back number:    [phone] (Mobile)         New or established patient?  [] New  [x] Established    Date of discharge: 3-4-25    Facility discharged from: [facility]    Diagnosis/Symptoms: Stroke-like symptoms, possible TIA       Specialty Only: Did you see a [facility] health provider?    [x] Yes  [] No  If so, who? NEELAM RAMIREZ     I HAVE MADE PT APPOINTMENT FOR 8-4-25 AS FIRST AVAILABLE, IT HAD TO BE WITH DR.SEIPEL ONLY AS PT IS A MEMORY PT IN CLINIC. PER D/C IT STATES STROKE CLINIC BUT NO NO TIME, PLEASE REVIEW AND ADVISE FOR SOONER APPOINTMENT     SHE ALSO WANTED TO ADD THAT PT IS NOW ON OXYGEN

## 2025-03-06 ENCOUNTER — TRANSITIONAL CARE MANAGEMENT TELEPHONE ENCOUNTER (OUTPATIENT)
Dept: CALL CENTER | Facility: HOSPITAL | Age: 70
End: 2025-03-06
Payer: COMMERCIAL

## 2025-03-06 NOTE — OUTREACH NOTE
Call Center TCM Note      Flowsheet Row Responses   [facility] facility patient discharged from? [facility]   Does the patient have one of the following disease processes/diagnoses(primary or secondary)? Stroke   TCM attempt successful? No   Unsuccessful attempts Attempt 3            Zohra Costello RN    3/6/2025, 13:43 EST

## 2025-03-11 ENCOUNTER — HOSPITAL ENCOUNTER (OUTPATIENT)
Dept: RESPIRATORY THERAPY | Facility: HOSPITAL | Age: 70
Discharge: HOME | End: 2025-03-11
Payer: COMMERCIAL

## 2025-03-11 VITALS — OXYGEN SATURATION: 93 % | HEART RATE: 72 BPM | RESPIRATION RATE: 16 BRPM

## 2025-03-11 DIAGNOSIS — R06.2 WHEEZING: ICD-10-CM

## 2025-03-11 DIAGNOSIS — J30.1 CHRONIC SEASONAL ALLERGIC RHINITIS DUE TO POLLEN: ICD-10-CM

## 2025-03-11 PROCEDURE — 94799 UNLISTED PULMONARY SVC/PX: CPT

## 2025-03-11 PROCEDURE — 94727 GAS DIL/WSHOT DETER LNG VOL: CPT

## 2025-03-11 PROCEDURE — 94060 EVALUATION OF WHEEZING: CPT

## 2025-03-11 PROCEDURE — 94729 DIFFUSING CAPACITY: CPT

## 2025-03-11 PROCEDURE — 63710000001 ALBUTEROL SULFATE HFA 108 (90 BASE) MCG/ACT AEROSOL SOLUTION 6.7 G INHALER

## 2025-03-11 PROCEDURE — A9270 NON-COVERED ITEM OR SERVICE: HCPCS

## 2025-03-11 PROCEDURE — 94664 DEMO&/EVAL PT USE INHALER: CPT

## 2025-03-11 RX ORDER — ALBUTEROL SULFATE 90 UG/1
2 INHALANT RESPIRATORY (INHALATION) ONCE
Status: COMPLETED | OUTPATIENT
Start: 2025-03-11 | End: 2025-03-11

## 2025-03-11 RX ADMIN — ALBUTEROL SULFATE 2 PUFF: 108 AEROSOL, METERED RESPIRATORY (INHALATION) at 11:14

## 2025-03-26 NOTE — PROGRESS NOTES
Chief Complaint  Hospital Follow Up Visit and Back Pain    Subjective          Genevieve Kuo presents to [facility] for     [facility]  Hospital follow up, admitted on 03/03/2025, was admitted to [facility]. Was admitted for altered mental status.  Discharged on 03/04/2025, discharge diagnosis was Stroke like symptoms, possible TIA. Labs that were performed during the encounter included: CBC-WBC:8.85, RBC:4.18, Hemoglobin: 12.1, Hematocrit: 39.2 and BMP- Glucose: 89, BUN: 17, Creatinine:0.90, Sodium: 143, Potassium: 4.1,Calcium:8.8, BUN/Creatinine Ratio: 18.9, Magnesium: 2.3, Hemoglobin A1c: 6.09. Diagnostic studies that were performed included: Chest x-ray-No acute cardiopulmonary abnormality is identified. , MRI- Brain without Contrast: No acute intracranial finding. No MR evidence of acute or subacute ischemic insult, Features of mild chronic microvascular disease appear similar to the 1/10/2022 MRI brain., and CT Scan-Angiogram neck, Angiogram Head,Head without contrast. Currently Genevieve receives care at home. Complications from the hospital stay include right sided weakness. The patient stated that they do need help with their daily life and activities. The patient stated that they do have emotional support at home. Started on Plavix and Lipitor. Is scheduled for neurology follow up.    O2 levels in the hospital were in the 80s. She was discharged on 2 L O2 via NC and has been on it constantly since then.    PFT done 3/11/2025 showed mild to moderate mixed obstructive and restrictive lung disease there is significant improvement with bronchodilators.    Back Pain   Back pain that radiates down into left leg. States comes and goes. Pain does cause some foot drop.  Was taken off of baclofen while in the hospital.      Objective   Vital Signs:   /68 (BP Location: Right arm, Patient Position: Sitting, Cuff Size: Adult)   Pulse 87   Temp 97.7 °F (36.5 °C) (Temporal)    "Resp 18   Ht 167.6 cm (66\")   Wt 90.5 kg (199 lb 8 oz)   SpO2 97% Comment: room air  BMI 32.20 kg/m²     Physical Exam  Vitals and nursing note reviewed.   Constitutional:       General: She is not in acute distress.     Appearance: Normal appearance. She is not ill-appearing or diaphoretic.   HENT:      Head: Normocephalic and atraumatic.      Nose: No congestion or rhinorrhea.   Eyes:      Extraocular Movements: Extraocular movements intact.      Pupils: Pupils are equal, round, and reactive to light.   Cardiovascular:      Rate and Rhythm: Normal rate and regular rhythm.      Pulses: Normal pulses.   Pulmonary:      Effort: Pulmonary effort is normal. No respiratory distress.      Breath sounds: Normal breath sounds. No wheezing, rhonchi or rales.   Musculoskeletal:         General: Normal range of motion.      Cervical back: Normal range of motion and neck supple.   Skin:     General: Skin is warm and dry.      Capillary Refill: Capillary refill takes less than 2 seconds.   Neurological:      Mental Status: She is alert and oriented to person, place, and time.   Psychiatric:         Mood and Affect: Mood normal.         Behavior: Behavior normal.        Result Review :                 Assessment and Plan    Diagnoses and all orders for this visit:    1. 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  She is not on any form of maintenance inhaler.  She does use an we will get her started on as needed albuterol inhaler.  We will get her started on a maintenance inhaler.  She was provided with Trelegy samples in the office today.  Trelegy will also be prescribed for her.  If it is not covered, we will replace it with a suitable alternative.  Close follow-up in May as scheduled or sooner if " needed.    Orders:  -     Fluticasone-Umeclidin-Vilant (Trelegy Ellipta) 100-62.5-25 MCG/ACT inhaler; Inhale 1 puff Daily.  Dispense: 28 each; Refill: 11    2. Stroke-like symptoms  Assessment & Plan:  No recurrence of symptoms since hospital discharge.  Continue Plavix and atorvastatin.  F/u with neurology as scheduled.      3. Intervertebral lumbar disc disorder with myelopathy, lumbar region  Overview:  Bilateral lower back pain with bilateral sciatica.  Lumbar xray 2021:  Mild anterolisthesis of L4 and L5 and L5 on S1 related to facet  arthropathy.    Assessment & Plan:  Allergic to NSAIDs.  Has significant relief from baclofen which she desires to resume. She feels that quality of life improvements from it outweigh any potential risks. She is not taking it more than once daily.    Orders:  -     baclofen (LIORESAL) 10 MG tablet; Take 1 tablet by mouth Daily As Needed for Muscle Spasms.  Dispense: 30 tablet; Refill: 1       Meet Rodriguez MD    NOTE: [facility], per Health Information accessibility laws, allows progress notes to be visible to patients. Please note that these notes will include professional medical terminology that may be somewhat confusing without some interpretation from your medical professional team. The intent of progress notes is to communicate information between any medical professionals involved in your case, or to serve as future reference for myself or any other provider when reviewing your case, as well as a reference for the patient viewing the record. Please ask a member of the medical team if you have any questions about terminology or content of note.    DISCLAIMER: Part of this note may be an electronic transcription/translation of spoken language to printed text using the Dragon Dictation System.

## 2025-03-28 ENCOUNTER — OFFICE VISIT (OUTPATIENT)
Dept: FAMILY MEDICINE CLINIC | Facility: CLINIC | Age: 70
End: 2025-03-28
Payer: COMMERCIAL

## 2025-03-28 VITALS
WEIGHT: 199.5 LBS | HEART RATE: 87 BPM | HEIGHT: 66 IN | BODY MASS INDEX: 32.06 KG/M2 | SYSTOLIC BLOOD PRESSURE: 122 MMHG | DIASTOLIC BLOOD PRESSURE: 68 MMHG | OXYGEN SATURATION: 97 % | TEMPERATURE: 97.7 F | RESPIRATION RATE: 18 BRPM

## 2025-03-28 DIAGNOSIS — R29.90 STROKE-LIKE SYMPTOMS: ICD-10-CM

## 2025-03-28 DIAGNOSIS — J43.9 MIXED RESTRICTIVE AND OBSTRUCTIVE LUNG DISEASE: Primary | ICD-10-CM

## 2025-03-28 DIAGNOSIS — J98.4 MIXED RESTRICTIVE AND OBSTRUCTIVE LUNG DISEASE: Primary | ICD-10-CM

## 2025-03-28 DIAGNOSIS — M51.06 INTERVERTEBRAL LUMBAR DISC DISORDER WITH MYELOPATHY, LUMBAR REGION: ICD-10-CM

## 2025-03-28 PROBLEM — J44.9 MIXED RESTRICTIVE AND OBSTRUCTIVE LUNG DISEASE: Status: ACTIVE | Noted: 2025-03-28

## 2025-03-28 RX ORDER — BACLOFEN 10 MG/1
10 TABLET ORAL DAILY PRN
Qty: 30 TABLET | Refills: 1 | Status: SHIPPED | OUTPATIENT
Start: 2025-03-28

## 2025-03-28 RX ORDER — FLUTICASONE FUROATE, UMECLIDINIUM BROMIDE AND VILANTEROL TRIFENATATE 100; 62.5; 25 UG/1; UG/1; UG/1
1 POWDER RESPIRATORY (INHALATION)
Qty: 28 EACH | Refills: 11 | Status: SHIPPED | OUTPATIENT
Start: 2025-03-28

## 2025-03-28 NOTE — ASSESSMENT & PLAN NOTE
PFT 3/2025 - mild to moderate mixed obstructive and restrictive lung disease there is significant improvement with bronchodilators

## 2025-03-28 NOTE — ASSESSMENT & PLAN NOTE
Patient's (Body mass index is 32.2 kg/m².) indicates that they are obese (BMI >30) with health conditions that include obstructive sleep apnea, diabetes mellitus, and dyslipidemias . Weight is worsening. BMI  is above average; BMI management plan is completed. We discussed portion control and increasing exercise.

## 2025-03-28 NOTE — ASSESSMENT & PLAN NOTE
No recurrence of symptoms since hospital discharge.  Continue Plavix and atorvastatin.  F/u with neurology as scheduled.

## 2025-03-28 NOTE — ASSESSMENT & PLAN NOTE
She is now requiring 2 L O2 via NC.  She is not on any form of maintenance inhaler.  She does use an we will get her started on as needed albuterol inhaler.  We will get her started on a maintenance inhaler.  She was provided with Trelegy samples in the office today.  Trelegy will also be prescribed for her.  If it is not covered, we will replace it with a suitable alternative.  Close follow-up in May as scheduled or sooner if needed.

## 2025-03-28 NOTE — ASSESSMENT & PLAN NOTE
Allergic to NSAIDs.  Has significant relief from baclofen which she desires to resume. She feels that quality of life improvements from it outweigh any potential risks. She is not taking it more than once daily.

## 2025-04-06 DIAGNOSIS — F51.01 PRIMARY INSOMNIA: ICD-10-CM

## 2025-04-06 DIAGNOSIS — E03.9 ACQUIRED HYPOTHYROIDISM: ICD-10-CM

## 2025-04-07 RX ORDER — QUETIAPINE FUMARATE 25 MG/1
25 TABLET, FILM COATED ORAL
Qty: 90 TABLET | Refills: 3 | Status: SHIPPED | OUTPATIENT
Start: 2025-04-07

## 2025-04-07 RX ORDER — LEVOTHYROXINE SODIUM 100 UG/1
100 TABLET ORAL DAILY
Qty: 90 TABLET | Refills: 3 | OUTPATIENT
Start: 2025-04-07

## 2025-04-29 DIAGNOSIS — J98.4 MIXED RESTRICTIVE AND OBSTRUCTIVE LUNG DISEASE: ICD-10-CM

## 2025-04-29 DIAGNOSIS — J44.9 MIXED RESTRICTIVE AND OBSTRUCTIVE LUNG DISEASE: ICD-10-CM

## 2025-04-29 NOTE — TELEPHONE ENCOUNTER
Caller: Genevieve Kuo    Relationship: Self    Best call back number: [phone]     Requested Prescriptions:   Requested Prescriptions     Pending Prescriptions Disp Refills    Fluticasone-Umeclidin-Vilant (Trelegy Ellipta) 100-62.5-25 MCG/ACT inhaler 28 each 11     Sig: Inhale 1 puff Daily.        Pharmacy where request should be sent: [id number] - [place], [place] - [address] - [phone] [facility] [phone] FX     Last office visit with prescribing clinician: 3/28/2025   Last telemedicine visit with prescribing clinician: Visit date not found   Next office visit with prescribing clinician: 5/19/2025     Additional details provided by patient: WAS GIVEN SAMPLE AT LAST OFFICE VISIT AND STATES WORKS WELL WOULD LIKE A PRESCRIPTION SENT TO PHARMACY    Does the patient have less than a 3 day supply:  [x] Yes  [] No    Would you like a call back once the refill request has been completed: [] Yes [x] No    If the office needs to give you a call back, can they leave a voicemail: [] Yes [x] No    Shayne Bach Rep   04/29/25 14:52 EDT

## 2025-04-30 RX ORDER — FLUTICASONE FUROATE, UMECLIDINIUM BROMIDE AND VILANTEROL TRIFENATATE 100; 62.5; 25 UG/1; UG/1; UG/1
1 POWDER RESPIRATORY (INHALATION)
Qty: 28 EACH | Refills: 11 | Status: SHIPPED | OUTPATIENT
Start: 2025-04-30

## 2025-05-15 NOTE — PROGRESS NOTES
"Chief Complaint  Diabetes and Back Pain    Subjective          Genevieve Kuo presents to [facility] for     HPI    Diabetes  Patient does not check blood sugar at home .  Associated symptoms include None  Per patient current diet is Well Balanced and Variety of foods.  Patient does not avoid concentrated sweets.  Patient does not see podiatry.  Patient see's Dr. Palacios  for diabetic eye exam and last eye exam was June 2024.  Patient reports they are taking medications as prescribed and they are not having side effects.  Last A1c was 6.1 on 02/17/2025.     Back Pain: Patient complains of lumbar spine and Sciatica  pain, unilateral on left, which is described as aching, dull, sharp, shooting, stabbing, and throbbing.    She reports that the pain has been present for 3 years on and off. Current episode started 6 months ago, worsening since then.  Exacerbated by standing and walking, relieved by siting and lying down, acetaminophen and baclofen.   The back problem is not work related.  She denies fever, saddle anesthesia. bladder or bowel incontinence, or weakness to the hips or legs.    Diabetic Foot Exam Performed and Monofilament Test Performed    Objective   Vital Signs:   Pulse 99   Temp 98.7 °F (37.1 °C) (Temporal)   Resp 17   Ht 167.6 cm (66\")   Wt 90.1 kg (198 lb 9.6 oz)   SpO2 94%   BMI 32.05 kg/m²     Physical Exam  Vitals and nursing note reviewed.   Constitutional:       General: She is not in acute distress.     Appearance: Normal appearance. She is not ill-appearing or diaphoretic.   HENT:      Head: Normocephalic and atraumatic.      Nose: No congestion or rhinorrhea.   Eyes:      Extraocular Movements: Extraocular movements intact.      Pupils: Pupils are equal, round, and reactive to light.   Cardiovascular:      Rate and Rhythm: Normal rate and regular rhythm.      Pulses: Normal pulses.   Pulmonary:      Effort: Pulmonary effort is normal.      Breath sounds: " Normal breath sounds.   Musculoskeletal:      Cervical back: Normal range of motion and neck supple.      Lumbar back: Tenderness present. No bony tenderness. Positive left straight leg raise test. Negative right straight leg raise test.        Feet:    Feet:      Right foot:      Protective Sensation:   9 sites sensed.      Skin integrity: Dry skin present.      Left foot:      Protective Sensation: 10 sites tested.  8 sites sensed.      Skin integrity: Dry skin present.      Comments: Patient states that she has no issues with numbness and or tingling but at times she feels she has a corn meal like texture between her toes.   Skin:     General: Skin is warm and dry.      Capillary Refill: Capillary refill takes less than 2 seconds.   Neurological:      Mental Status: She is alert and oriented to person, place, and time.      Sensory: No sensory deficit.      Motor: No weakness.   Psychiatric:         Mood and Affect: Mood normal.         Behavior: Behavior normal.        Result Review :                 Assessment and Plan    Diagnoses and all orders for this visit:    1. Type 2 diabetes mellitus without complication, without long-term current use of insulin (Primary)  Overview:  Regimen: Metformin 500 mg qd  Took Ozempic previously - stopped taking it, does not desire additional medication.    A1c 5.8 05/19/2025  A1c 6.1 02/17/2025  A1c 6.2 11/08/2024  A1c 6.1 08/06/2024  A1c 5.7 04/23/2024  A1c 6.4 01/18/2024   A1c 6.0 01/03/2023  A1c 5.9 08/14/2022  A1c 6.0 06/14/2022  A1c 6.5 01/2022    Assessment & Plan:  Excellent control with diet and low dose metformin monotherapy.  Repeat A1c in 3-6 months.    Orders:  -     Microalbumin / Creatinine Urine Ratio - Urine, Random Void  -     POC Glycosylated Hemoglobin (Hb A1C)    2. Intervertebral lumbar disc disorder with myelopathy, lumbar region  Overview:  Bilateral lower back pain with bilateral sciatica.    Lumbar xray 2021:  Mild anterolisthesis of L4 and L5 and L5  on S1 related to facet  arthropathy.    Takes prn acetaminophen and Baclofen which is very effective - She feels that quality of life improvements from it outweigh any potential risks. She is not taking it more than once daily.     Allergic to NSAIDs.    Has completed PT in the past without relief.    Assessment & Plan:  Symptoms have gradually progressed.  She is agreeable to reestablish with pain management.  We will also get an MRI and refer to neurosurgery if indicated.  In the meantime, she is currently struggling with pain. Desires steroid injection and brief course of po. Her A1c is excellent and feel she can tolerate it.    Orders:  -     Ambulatory Referral to Pain Management  -     MRI Lumbar Spine Without Contrast; Future  -     dexAMETHasone (DECADRON) injection 10 mg  -     predniSONE (DELTASONE) 20 MG tablet; Take 1 tablet by mouth Daily for 4 days.  Dispense: 4 tablet; Refill: 0           Meet Rodriguez MD    NOTE: [employer], per Health Information accessibility laws, allows progress notes to be visible to patients. Please note that these notes will include professional medical terminology that may be somewhat confusing without some interpretation from your medical professional team. The intent of progress notes is to communicate information between any medical professionals involved in your case, or to serve as future reference for myself or any other provider when reviewing your case, as well as a reference for the patient viewing the record. Please ask a member of the medical team if you have any questions about terminology or content of note.    DISCLAIMER: Part of this note may be an electronic transcription/translation of spoken language to printed text using the Dragon Dictation System.

## 2025-05-19 ENCOUNTER — OFFICE VISIT (OUTPATIENT)
Dept: FAMILY MEDICINE CLINIC | Facility: CLINIC | Age: 70
End: 2025-05-19
Payer: COMMERCIAL

## 2025-05-19 VITALS
RESPIRATION RATE: 17 BRPM | WEIGHT: 198.6 LBS | HEART RATE: 99 BPM | BODY MASS INDEX: 31.92 KG/M2 | TEMPERATURE: 98.7 F | SYSTOLIC BLOOD PRESSURE: 130 MMHG | OXYGEN SATURATION: 94 % | DIASTOLIC BLOOD PRESSURE: 66 MMHG | HEIGHT: 66 IN

## 2025-05-19 DIAGNOSIS — E11.9 TYPE 2 DIABETES MELLITUS WITHOUT COMPLICATION, WITHOUT LONG-TERM CURRENT USE OF INSULIN: Primary | ICD-10-CM

## 2025-05-19 DIAGNOSIS — M51.06 INTERVERTEBRAL LUMBAR DISC DISORDER WITH MYELOPATHY, LUMBAR REGION: ICD-10-CM

## 2025-05-19 LAB
EXPIRATION DATE: ABNORMAL
HBA1C MFR BLD: 5.8 % (ref 4.5–5.7)
Lab: ABNORMAL

## 2025-05-19 PROCEDURE — 96372 THER/PROPH/DIAG INJ SC/IM: CPT

## 2025-05-19 PROCEDURE — 3044F HG A1C LEVEL LT 7.0%: CPT

## 2025-05-19 PROCEDURE — 83036 HEMOGLOBIN GLYCOSYLATED A1C: CPT

## 2025-05-19 PROCEDURE — 1125F AMNT PAIN NOTED PAIN PRSNT: CPT

## 2025-05-19 PROCEDURE — 99214 OFFICE O/P EST MOD 30 MIN: CPT

## 2025-05-19 RX ORDER — PREDNISONE 20 MG/1
20 TABLET ORAL DAILY
Qty: 4 TABLET | Refills: 0 | Status: SHIPPED | OUTPATIENT
Start: 2025-05-19 | End: 2025-05-23

## 2025-05-19 RX ORDER — DEXAMETHASONE SODIUM PHOSPHATE 10 MG/ML
10 INJECTION, SOLUTION INTRA-ARTICULAR; INTRALESIONAL; INTRAMUSCULAR; INTRAVENOUS; SOFT TISSUE ONCE
Status: COMPLETED | OUTPATIENT
Start: 2025-05-19 | End: 2025-05-19

## 2025-05-19 RX ADMIN — DEXAMETHASONE SODIUM PHOSPHATE 10 MG: 10 INJECTION, SOLUTION INTRA-ARTICULAR; INTRALESIONAL; INTRAMUSCULAR; INTRAVENOUS; SOFT TISSUE at 09:01

## 2025-05-19 NOTE — ASSESSMENT & PLAN NOTE
Symptoms have gradually progressed.  She is agreeable to reestablish with pain management.  We will also get an MRI and refer to neurosurgery if indicated.  In the meantime, she is currently struggling with pain. Desires steroid injection and brief course of po. Her A1c is excellent and feel she can tolerate it.   [facility]

## 2025-05-20 LAB
ALBUMIN/CREAT UR: 6 MG/G CREAT (ref 0–29)
CREAT UR-MCNC: 146.4 MG/DL
MICROALBUMIN UR-MCNC: 8.4 UG/ML

## 2025-06-03 DIAGNOSIS — F41.9 ANXIETY AND DEPRESSION: ICD-10-CM

## 2025-06-03 DIAGNOSIS — F32.A ANXIETY AND DEPRESSION: ICD-10-CM

## 2025-06-03 DIAGNOSIS — J30.1 CHRONIC SEASONAL ALLERGIC RHINITIS DUE TO POLLEN: ICD-10-CM

## 2025-06-03 RX ORDER — MONTELUKAST SODIUM 10 MG/1
10 TABLET ORAL DAILY
Qty: 90 TABLET | Refills: 3 | Status: SHIPPED | OUTPATIENT
Start: 2025-06-03

## 2025-06-03 RX ORDER — LEVOTHYROXINE SODIUM 100 UG/1
100 TABLET ORAL DAILY
Qty: 90 TABLET | Refills: 3 | Status: SHIPPED | OUTPATIENT
Start: 2025-06-03

## 2025-06-03 RX ORDER — ESCITALOPRAM OXALATE 20 MG/1
20 TABLET ORAL DAILY
Qty: 90 TABLET | Refills: 4 | Status: SHIPPED | OUTPATIENT
Start: 2025-06-03

## 2025-06-03 NOTE — TELEPHONE ENCOUNTER
Name from pharmacy: ESCITALOPRAM 20 MG TABLET         Will file in chart as: escitalopram (LEXAPRO) 20 MG tablet    The original prescription was discontinued on 3/3/2025 by Cecile Helms, Pharmacy Technician for the following reason: *Therapy completed. Renewing this prescription may not be appropriate.

## 2025-06-04 ENCOUNTER — TELEPHONE (OUTPATIENT)
Dept: FAMILY MEDICINE CLINIC | Facility: CLINIC | Age: 70
End: 2025-06-04
Payer: COMMERCIAL

## 2025-06-04 RX ORDER — PREDNISONE 10 MG/1
10 TABLET ORAL DAILY
Qty: 7 TABLET | Refills: 0 | Status: SHIPPED | OUTPATIENT
Start: 2025-06-04 | End: 2025-06-11

## 2025-06-04 NOTE — TELEPHONE ENCOUNTER
Caller: Genevieve Kuo    Relationship: Self  [phone]   Best call back number:     Who are you requesting to speak with (clinical staff, provider,  specific staff member): CLINICAL        What was the call regarding: BACK PAIN HAS INCREASED, CAN'T GET MRI UNTIL MONDAY WOULD LIKE A NEW MEDICATION. PLEASE CALL BACK WITH ADVISEMENT

## 2025-06-18 ENCOUNTER — TELEPHONE (OUTPATIENT)
Dept: FAMILY MEDICINE CLINIC | Facility: CLINIC | Age: 70
End: 2025-06-18
Payer: COMMERCIAL

## 2025-06-18 NOTE — TELEPHONE ENCOUNTER
Called and left detailed voicemail for masha (financial team) letting her know that per Dr. Rodriguez it is okay.

## 2025-06-18 NOTE — TELEPHONE ENCOUNTER
Is it okay for patient to be rescheduled if auth is not received by 2:30 pm today?    SPOKE TO YAMINI NEWTON AT [employer], STATED STILL PENDING, HAS UP TO 9 CALENDAR DAYS UNTIL REVIEW, CURRENTLY AT 3 CALENDARS LEFT UNTIL DETERMINATION OF AUTH

## 2025-06-18 NOTE — TELEPHONE ENCOUNTER
Patient is scheduled tomorrow for MRI Lumbar spine without contrast. Financial Clearance has not received authorization from insurance. If authorization is not received by 2:30 today, is it ok to reschedule the appointment? Please advise.

## 2025-06-19 ENCOUNTER — TELEPHONE (OUTPATIENT)
Dept: FAMILY MEDICINE CLINIC | Facility: CLINIC | Age: 70
End: 2025-06-19
Payer: COMMERCIAL

## 2025-06-19 NOTE — TELEPHONE ENCOUNTER
Patient's daughter,Suyapa, called upset that her mother's MRI for today was cancelled without anyone notifying her mother, her father, or her. She stated she took off work & they were on their way to the appointment when [facility] & the appointment was gone.  She stated they have waited for a month for the appointment & for someone to cancel it without telling them it was cancelled is unacceptable. Transferred call to Charissa BRENNAN referral coordinator.

## 2025-06-27 NOTE — PROGRESS NOTES
"Chief Complaint  Obstructive lung disease and URI    Subjective          Genevieve Kuo presents to [facility] for     HPI    UTI  Patient endorses burning with urination, painful urination, low back pain (R > L), urinary frequency. Symptoms started about 1 week ago.  Denies abdominal pain, discharge, fever, chills.    Mixed restrictive and obstructive lung disease  Patient would also like to discuss discontinuing her home oxygen.  Patient reports she has not been using the oxygen for about 2 months, states she never noticed a change and O2 levels are remaining >92% without supplemental oxygen.    Objective   Vital Signs:   /78 (BP Location: Right arm, Patient Position: Sitting, Cuff Size: Adult)   Pulse 88   Temp 98 °F (36.7 °C) (Temporal)   Resp 16   Ht 167.6 cm (66\")   Wt 91.5 kg (201 lb 12.8 oz)   SpO2 93%   BMI 32.57 kg/m²     Physical Exam  Vitals and nursing note reviewed.   Constitutional:       General: She is not in acute distress.     Appearance: Normal appearance. She is not ill-appearing or diaphoretic.   HENT:      Head: Normocephalic and atraumatic.      Nose: No congestion or rhinorrhea.   Eyes:      Extraocular Movements: Extraocular movements intact.      Pupils: Pupils are equal, round, and reactive to light.   Cardiovascular:      Rate and Rhythm: Normal rate and regular rhythm.      Pulses: Normal pulses.   Pulmonary:      Effort: Pulmonary effort is normal. No respiratory distress.      Breath sounds: Normal breath sounds.   Abdominal:      Tenderness: There is abdominal tenderness in the suprapubic area. There is no right CVA tenderness or left CVA tenderness.   Musculoskeletal:         General: Normal range of motion.      Cervical back: Normal range of motion and neck supple.   Skin:     General: Skin is warm and dry.      Capillary Refill: Capillary refill takes less than 2 seconds.   Neurological:      Mental Status: She is alert and oriented " to person, place, and time.   Psychiatric:         Mood and Affect: Mood normal.         Behavior: Behavior normal.        Result Review :                 Assessment and Plan    Diagnoses and all orders for this visit:    1. Acute UTI (Primary)  Comments:  Large leuks and nitrites on UA. Start empiric Macrobid. Send urine for culture. Increase fluids. RTC if symptoms persist or worsen.  Orders:  -     POCT urinalysis dipstick, manual  -     Urine Culture - Urine, Urine, Clean Catch  -     nitrofurantoin, macrocrystal-monohydrate, (Macrobid) 100 MG capsule; Take 1 capsule by mouth 2 (Two) Times a Day for 7 days.  Dispense: 14 capsule; Refill: 0    2. Mixed restrictive and obstructive lung disease  Overview:  PFT 3/2025 - mild to moderate mixed obstructive and restrictive lung disease there is significant improvement with bronchodilators     Assessment & Plan:  She has not required her O2 at home in recent months.  6-minute walk test today demonstrated that she maintained O2 sat >90%.  She may discontinue her home O2 at this time but recommended that she continue to monitor O2 sat closely.  She is not on a maintenance inhaler - previously prescribed Trelegy but cost was too high.  We will try prescribing Spiriva, she is to notify us if she is not able to obtain it.    Orders:  -     tiotropium bromide monohydrate (SPIRIVA RESPIMAT) 2.5 MCG/ACT aerosol solution inhaler; Inhale 2 puffs Daily.  Dispense: 4 g; Refill: 11    3. Intervertebral lumbar disc disorder with myelopathy, lumbar region  Overview:  Bilateral lower back pain with bilateral sciatica.    Lumbar xray 2021:  Mild anterolisthesis of L4 and L5 and L5 on S1 related to facet  arthropathy.    Takes prn acetaminophen and Baclofen which is very effective - She feels that quality of life improvements from it outweigh any potential risks. She is not taking it more than once daily.     Allergic to NSAIDs.    Has completed PT in the past without  relief.    Orders:  -     baclofen (LIORESAL) 10 MG tablet; Take 1 tablet by mouth Daily As Needed for Muscle Spasms.  Dispense: 30 tablet; Refill: 0    4. Mixed hyperlipidemia  Overview:  Regimen: Atorvastatin 10 mg qd.    Lab Results   Component Value Date    CHOL 141 04/19/2019    CHLPL 148 08/06/2024    TRIG 138 08/06/2024    HDL 48 08/06/2024    LDL 76 08/06/2024     Well controlled. Compliant.    Orders:  -     atorvastatin (Lipitor) 40 MG tablet; Take 1 tablet by mouth Daily.  Dispense: 90 tablet; Refill: 3           Meet Rodriguez MD    NOTE: [employer], per Health Information accessibility laws, allows progress notes to be visible to patients. Please note that these notes will include professional medical terminology that may be somewhat confusing without some interpretation from your medical professional team. The intent of progress notes is to communicate information between any medical professionals involved in your case, or to serve as future reference for myself or any other provider when reviewing your case, as well as a reference for the patient viewing the record. Please ask a member of the medical team if you have any questions about terminology or content of note.    DISCLAIMER: Part of this note may be an electronic transcription/translation of spoken language to printed text using the Dragon Dictation System.

## 2025-06-30 ENCOUNTER — OFFICE VISIT (OUTPATIENT)
Dept: FAMILY MEDICINE CLINIC | Facility: CLINIC | Age: 70
End: 2025-06-30
Payer: COMMERCIAL

## 2025-06-30 VITALS
DIASTOLIC BLOOD PRESSURE: 78 MMHG | TEMPERATURE: 98 F | RESPIRATION RATE: 16 BRPM | OXYGEN SATURATION: 93 % | HEIGHT: 66 IN | BODY MASS INDEX: 32.43 KG/M2 | SYSTOLIC BLOOD PRESSURE: 120 MMHG | HEART RATE: 88 BPM | WEIGHT: 201.8 LBS

## 2025-06-30 DIAGNOSIS — J98.4 MIXED RESTRICTIVE AND OBSTRUCTIVE LUNG DISEASE: ICD-10-CM

## 2025-06-30 DIAGNOSIS — N39.0 ACUTE UTI: Primary | ICD-10-CM

## 2025-06-30 DIAGNOSIS — J44.9 MIXED RESTRICTIVE AND OBSTRUCTIVE LUNG DISEASE: ICD-10-CM

## 2025-06-30 DIAGNOSIS — M51.06 INTERVERTEBRAL LUMBAR DISC DISORDER WITH MYELOPATHY, LUMBAR REGION: ICD-10-CM

## 2025-06-30 DIAGNOSIS — E78.2 MIXED HYPERLIPIDEMIA: ICD-10-CM

## 2025-06-30 LAB
BILIRUB BLD-MCNC: NEGATIVE MG/DL
CLARITY, POC: CLEAR
COLOR UR: YELLOW
GLUCOSE UR STRIP-MCNC: NEGATIVE MG/DL
KETONES UR QL: NEGATIVE
LEUKOCYTE EST, POC: ABNORMAL
NITRITE UR-MCNC: POSITIVE MG/ML
PH UR: 5 [PH] (ref 5–8)
PROT UR STRIP-MCNC: ABNORMAL MG/DL
RBC # UR STRIP: ABNORMAL /UL
SP GR UR: 1.01 (ref 1–1.03)
UROBILINOGEN UR QL: ABNORMAL

## 2025-06-30 RX ORDER — BACLOFEN 10 MG/1
10 TABLET ORAL DAILY PRN
Qty: 30 TABLET | Refills: 0 | Status: SHIPPED | OUTPATIENT
Start: 2025-06-30

## 2025-06-30 RX ORDER — ATORVASTATIN CALCIUM 40 MG/1
40 TABLET, FILM COATED ORAL DAILY
Qty: 90 TABLET | Refills: 3 | Status: SHIPPED | OUTPATIENT
Start: 2025-06-30

## 2025-06-30 RX ORDER — NITROFURANTOIN 25; 75 MG/1; MG/1
100 CAPSULE ORAL 2 TIMES DAILY
Qty: 14 CAPSULE | Refills: 0 | Status: SHIPPED | OUTPATIENT
Start: 2025-06-30 | End: 2025-07-07

## 2025-07-03 LAB
BACTERIA UR CULT: ABNORMAL
BACTERIA UR CULT: ABNORMAL
OTHER ANTIBIOTIC SUSC ISLT: ABNORMAL

## 2025-07-11 ENCOUNTER — TELEPHONE (OUTPATIENT)
Dept: FAMILY MEDICINE CLINIC | Facility: CLINIC | Age: 70
End: 2025-07-11
Payer: COMMERCIAL

## 2025-07-11 NOTE — TELEPHONE ENCOUNTER
Caller: Genevieve Kuo    Relationship: Self    Best call back number: [phone]     What is the best time to reach you: ANY    Who are you requesting to speak with (clinical staff, provider,  specific staff member): PCP    Do you know the name of the person who called:     What was the call regarding: PLEASE FAX LETTER TO [facility] IN REGARDS TO DISCONTINUING OXYGEN.    Is it okay if the provider responds through MyChart:

## 2025-07-13 NOTE — ASSESSMENT & PLAN NOTE
She has not required her O2 at home in recent months.  6-minute walk test today demonstrated that she maintained O2 sat >90%.  She may discontinue her home O2 at this time but recommended that she continue to monitor O2 sat closely.  She is not on a maintenance inhaler - previously prescribed Trelegy but cost was too high.  We will try prescribing Spiriva, she is to notify us if she is not able to obtain it.

## 2025-07-14 NOTE — TELEPHONE ENCOUNTER
Called and left voicemail to return call.     Asked patient which [facility] location she uses, and also if she was able to get her Spiriva inhaler.

## 2025-07-16 NOTE — TELEPHONE ENCOUNTER
Called and left detailed voicemail. Let patient know that Trelegy 100 mcg inhaler sample is here for her to .

## 2025-07-17 ENCOUNTER — TELEPHONE (OUTPATIENT)
Dept: FAMILY MEDICINE CLINIC | Facility: CLINIC | Age: 70
End: 2025-07-17
Payer: COMMERCIAL

## 2025-07-17 NOTE — TELEPHONE ENCOUNTER
Sadie Santana RegSched Rep  DP    7/17/25  1:36 PM  Note      Provider: DR ALFARO      Caller: Genevieve Kuo     Relationship to Patient: Self     Phone Number: [phone]      Reason for Call: PATIENT IS LEAVING THE FAX NUMBER SO THAT THE LETTER GOING TO [facility] WILL GET TO THEM.     FAX#  [phone]

## 2025-07-22 ENCOUNTER — HOSPITAL ENCOUNTER (OUTPATIENT)
Dept: MRI IMAGING | Facility: HOSPITAL | Age: 70
Discharge: HOME | End: 2025-07-22
Payer: COMMERCIAL

## 2025-07-22 DIAGNOSIS — M51.06 INTERVERTEBRAL LUMBAR DISC DISORDER WITH MYELOPATHY, LUMBAR REGION: ICD-10-CM

## 2025-07-22 PROCEDURE — 72148 MRI LUMBAR SPINE W/O DYE: CPT

## 2025-07-23 ENCOUNTER — TELEPHONE (OUTPATIENT)
Dept: FAMILY MEDICINE CLINIC | Facility: CLINIC | Age: 70
End: 2025-07-23
Payer: COMMERCIAL

## 2025-07-23 ENCOUNTER — TELEPHONE (OUTPATIENT)
Dept: FAMILY MEDICINE CLINIC | Facility: CLINIC | Age: 70
End: 2025-07-23

## 2025-07-23 RX ORDER — NITROFURANTOIN 25; 75 MG/1; MG/1
100 CAPSULE ORAL 2 TIMES DAILY
Qty: 14 CAPSULE | Refills: 0 | Status: SHIPPED | OUTPATIENT
Start: 2025-07-23 | End: 2025-07-30

## 2025-07-23 NOTE — TELEPHONE ENCOUNTER
Caller: MINH LANDON    Relationship: Emergency Contact    Best call back number:   Telephone Information:   Mobile [phone]       What is the best time to reach you: ASAP    Who are you requesting to speak with (clinical staff, provider,  specific staff member): CLINICAL    Do you know the name of the person who called: MINH    What was the call regarding: PATIENTS DAUGHTER STATED THAT THE PATIENT HAS BULGING DISK IN HER BACK AND A SACETF DISEASE. PATIENT DAUGHTER STATED THAT SHE IS JUST WANTING TO KNOW WHAT THE NEXT STEPS ARE.    PATIENTS DAUGHTER IS JUST WANTING TO FIND SOME KIND OF RELIEF FOR THE PATIENTS PAIN, WHETHER THAT BE MEDICATION OR OTHERWISE    PLEASE CALL PATIENTS DAUGHTER TO DISCUSS.

## 2025-08-20 ENCOUNTER — OFFICE VISIT (OUTPATIENT)
Dept: FAMILY MEDICINE CLINIC | Facility: CLINIC | Age: 70
End: 2025-08-20
Payer: COMMERCIAL

## 2025-08-20 ENCOUNTER — OFFICE VISIT (OUTPATIENT)
Dept: PAIN MEDICINE | Facility: CLINIC | Age: 70
End: 2025-08-20
Payer: COMMERCIAL

## 2025-08-20 VITALS
DIASTOLIC BLOOD PRESSURE: 72 MMHG | OXYGEN SATURATION: 96 % | HEART RATE: 75 BPM | SYSTOLIC BLOOD PRESSURE: 134 MMHG | RESPIRATION RATE: 16 BRPM

## 2025-08-20 VITALS
WEIGHT: 199.4 LBS | HEART RATE: 85 BPM | RESPIRATION RATE: 18 BRPM | HEIGHT: 66 IN | OXYGEN SATURATION: 93 % | SYSTOLIC BLOOD PRESSURE: 128 MMHG | TEMPERATURE: 99.3 F | BODY MASS INDEX: 32.05 KG/M2 | DIASTOLIC BLOOD PRESSURE: 76 MMHG

## 2025-08-20 DIAGNOSIS — M54.50 CHRONIC BILATERAL LOW BACK PAIN WITHOUT SCIATICA: ICD-10-CM

## 2025-08-20 DIAGNOSIS — R35.0 URINARY FREQUENCY: ICD-10-CM

## 2025-08-20 DIAGNOSIS — Z79.899 HIGH RISK MEDICATION USE: Primary | ICD-10-CM

## 2025-08-20 DIAGNOSIS — M47.816 LUMBAR FACET ARTHROPATHY: ICD-10-CM

## 2025-08-20 DIAGNOSIS — N89.8 VAGINAL ITCHING: ICD-10-CM

## 2025-08-20 DIAGNOSIS — M46.1 SACROILIITIS: ICD-10-CM

## 2025-08-20 DIAGNOSIS — G89.29 CHRONIC BILATERAL LOW BACK PAIN WITHOUT SCIATICA: ICD-10-CM

## 2025-08-20 DIAGNOSIS — E11.9 TYPE 2 DIABETES MELLITUS WITHOUT COMPLICATION, WITHOUT LONG-TERM CURRENT USE OF INSULIN: Primary | ICD-10-CM

## 2025-08-20 LAB
BILIRUB BLD-MCNC: NEGATIVE MG/DL
CLARITY, POC: ABNORMAL
COLOR UR: YELLOW
EXPIRATION DATE: ABNORMAL
GLUCOSE UR STRIP-MCNC: NEGATIVE MG/DL
HBA1C MFR BLD: 6.5 % (ref 4.5–5.7)
KETONES UR QL: NEGATIVE
LEUKOCYTE EST, POC: ABNORMAL
Lab: ABNORMAL
NITRITE UR-MCNC: NEGATIVE MG/ML
PH UR: 5 [PH] (ref 5–8)
PROT UR STRIP-MCNC: NEGATIVE MG/DL
RBC # UR STRIP: ABNORMAL /UL
SP GR UR: 1.01 (ref 1–1.03)
UROBILINOGEN UR QL: NORMAL

## 2025-08-20 PROCEDURE — 1159F MED LIST DOCD IN RCRD: CPT | Performed by: PHYSICAL MEDICINE & REHABILITATION

## 2025-08-20 PROCEDURE — 1160F RVW MEDS BY RX/DR IN RCRD: CPT | Performed by: PHYSICAL MEDICINE & REHABILITATION

## 2025-08-20 PROCEDURE — 1125F AMNT PAIN NOTED PAIN PRSNT: CPT | Performed by: PHYSICAL MEDICINE & REHABILITATION

## 2025-08-20 PROCEDURE — G0463 HOSPITAL OUTPT CLINIC VISIT: HCPCS | Performed by: PHYSICAL MEDICINE & REHABILITATION

## 2025-08-20 RX ORDER — FLUTICASONE FUROATE, UMECLIDINIUM BROMIDE AND VILANTEROL TRIFENATATE 100; 62.5; 25 UG/1; UG/1; UG/1
1 POWDER RESPIRATORY (INHALATION)
COMMUNITY

## 2025-08-20 RX ORDER — HYDROCODONE BITARTRATE AND ACETAMINOPHEN 5; 325 MG/1; MG/1
1 TABLET ORAL EVERY 6 HOURS PRN
Qty: 30 TABLET | Refills: 0 | Status: SHIPPED | OUTPATIENT
Start: 2025-08-20

## 2025-08-22 LAB
A VAGINAE DNA VAG QL NAA+PROBE: NORMAL SCORE
BACTERIA UR CULT: NORMAL
BACTERIA UR CULT: NORMAL
BVAB2 DNA VAG QL NAA+PROBE: NORMAL SCORE
C ALBICANS DNA VAG QL NAA+PROBE: NEGATIVE
C GLABRATA DNA VAG QL NAA+PROBE: NEGATIVE
C TRACH DNA SPEC QL NAA+PROBE: NEGATIVE
MEGA1 DNA VAG QL NAA+PROBE: NORMAL SCORE
N GONORRHOEA DNA VAG QL NAA+PROBE: NEGATIVE
T VAGINALIS DNA VAG QL NAA+PROBE: NEGATIVE

## 2025-08-29 DIAGNOSIS — M51.06 INTERVERTEBRAL LUMBAR DISC DISORDER WITH MYELOPATHY, LUMBAR REGION: ICD-10-CM

## 2025-08-30 RX ORDER — BACLOFEN 10 MG/1
10 TABLET ORAL DAILY PRN
Qty: 30 TABLET | Refills: 0 | Status: SHIPPED | OUTPATIENT
Start: 2025-08-30